# Patient Record
Sex: FEMALE | Race: WHITE | NOT HISPANIC OR LATINO | Employment: OTHER | URBAN - METROPOLITAN AREA
[De-identification: names, ages, dates, MRNs, and addresses within clinical notes are randomized per-mention and may not be internally consistent; named-entity substitution may affect disease eponyms.]

---

## 2017-05-16 ENCOUNTER — PATIENT MESSAGE (OUTPATIENT)
Dept: OPTOMETRY | Facility: CLINIC | Age: 62
End: 2017-05-16

## 2017-05-17 ENCOUNTER — PATIENT MESSAGE (OUTPATIENT)
Dept: OPTOMETRY | Facility: CLINIC | Age: 62
End: 2017-05-17

## 2017-06-22 ENCOUNTER — HOSPITAL ENCOUNTER (OUTPATIENT)
Dept: RADIOLOGY | Facility: HOSPITAL | Age: 62
Discharge: HOME | End: 2017-06-22
Attending: INTERNAL MEDICINE
Payer: COMMERCIAL

## 2017-06-22 ENCOUNTER — OFFICE VISIT (OUTPATIENT)
Dept: INTERNAL MEDICINE | Facility: CLINIC | Age: 62
End: 2017-06-22
Payer: COMMERCIAL

## 2017-06-22 ENCOUNTER — TELEPHONE (OUTPATIENT)
Dept: INTERNAL MEDICINE | Facility: CLINIC | Age: 62
End: 2017-06-22

## 2017-06-22 ENCOUNTER — OFFICE VISIT (OUTPATIENT)
Dept: OPTOMETRY | Facility: CLINIC | Age: 62
End: 2017-06-22
Payer: COMMERCIAL

## 2017-06-22 ENCOUNTER — CLINICAL SUPPORT (OUTPATIENT)
Dept: OPHTHALMOLOGY | Facility: CLINIC | Age: 62
End: 2017-06-22
Payer: COMMERCIAL

## 2017-06-22 VITALS
WEIGHT: 140.44 LBS | SYSTOLIC BLOOD PRESSURE: 120 MMHG | HEART RATE: 88 BPM | TEMPERATURE: 98 F | BODY MASS INDEX: 23.37 KG/M2 | DIASTOLIC BLOOD PRESSURE: 70 MMHG

## 2017-06-22 VITALS — BODY MASS INDEX: 23.32 KG/M2 | HEIGHT: 65 IN | WEIGHT: 140 LBS

## 2017-06-22 DIAGNOSIS — H40.013 OAG (OPEN ANGLE GLAUCOMA) SUSPECT, LOW RISK, BILATERAL: Primary | ICD-10-CM

## 2017-06-22 DIAGNOSIS — Z83.511 FAMILY HISTORY OF GLAUCOMA IN BROTHER: ICD-10-CM

## 2017-06-22 DIAGNOSIS — Z12.39 BREAST CANCER SCREENING: ICD-10-CM

## 2017-06-22 DIAGNOSIS — Z00.00 ROUTINE GENERAL MEDICAL EXAMINATION AT A HEALTH CARE FACILITY: Primary | ICD-10-CM

## 2017-06-22 DIAGNOSIS — Z00.00 GENERAL MEDICAL EXAM: Primary | ICD-10-CM

## 2017-06-22 DIAGNOSIS — H25.13 NUCLEAR SCLEROTIC CATARACT OF BOTH EYES: ICD-10-CM

## 2017-06-22 DIAGNOSIS — H52.223 REGULAR ASTIGMATISM OF BOTH EYES: ICD-10-CM

## 2017-06-22 PROCEDURE — 77067 SCR MAMMO BI INCL CAD: CPT | Mod: TC

## 2017-06-22 PROCEDURE — 92133 CPTRZD OPH DX IMG PST SGM ON: CPT | Mod: S$GLB,,, | Performed by: OPTOMETRIST

## 2017-06-22 PROCEDURE — 92015 DETERMINE REFRACTIVE STATE: CPT | Mod: S$GLB,,, | Performed by: OPTOMETRIST

## 2017-06-22 PROCEDURE — 99999 PR PBB SHADOW E&M-EST. PATIENT-LVL III: CPT | Mod: PBBFAC,,, | Performed by: INTERNAL MEDICINE

## 2017-06-22 PROCEDURE — 92083 EXTENDED VISUAL FIELD XM: CPT | Mod: S$GLB,,, | Performed by: OPTOMETRIST

## 2017-06-22 PROCEDURE — 92014 COMPRE OPH EXAM EST PT 1/>: CPT | Mod: S$GLB,,, | Performed by: OPTOMETRIST

## 2017-06-22 PROCEDURE — 77067 SCR MAMMO BI INCL CAD: CPT | Mod: 26,,, | Performed by: RADIOLOGY

## 2017-06-22 PROCEDURE — 99396 PREV VISIT EST AGE 40-64: CPT | Mod: S$GLB,,, | Performed by: INTERNAL MEDICINE

## 2017-06-22 PROCEDURE — 99999 PR PBB SHADOW E&M-EST. PATIENT-LVL II: CPT | Mod: PBBFAC,,, | Performed by: OPTOMETRIST

## 2017-06-22 PROCEDURE — 77063 BREAST TOMOSYNTHESIS BI: CPT | Mod: 26,,, | Performed by: RADIOLOGY

## 2017-06-22 RX ORDER — TESTOSTERONE CYPIONATE 200 MG/ML
INJECTION, SOLUTION INTRAMUSCULAR
COMMUNITY
End: 2021-05-26

## 2017-06-22 RX ORDER — ESTRADIOL 0.04 MG/D
FILM, EXTENDED RELEASE TRANSDERMAL
COMMUNITY
Start: 2014-07-31 | End: 2020-01-21 | Stop reason: CLARIF

## 2017-06-22 RX ORDER — VITAMIN B COMPLEX
CAPSULE ORAL
COMMUNITY
End: 2023-08-29

## 2017-06-22 RX ORDER — TESTOSTERONE CYPIONATE 1000 MG/10ML
INJECTION, SOLUTION INTRAMUSCULAR
COMMUNITY
Start: 2014-07-31 | End: 2023-08-29

## 2017-06-22 NOTE — PROGRESS NOTES
HPI     Glaucoma    Additional comments: 6 mon iop chk           Comments   Ms. Dawit Carrion is her for a 6 month f/u (glaucoma suspect OU)   and review of 6-month repeat HVF 24-2ss and RNFL OCT that were performed   earlier today.    Patient complains of her OS va being a little more blurry for the distance   since her last visit. She requests refraction today.    (-)drops  (-)flashes  (-)floaters  (-)diplopia    Diabetic: no  No results found for: HGBA1C    OCULAR HISTORY  Last Eye Exam 11/01/16 with Dr. Blanca  (+)eye surgery- LASIK OU 2001  OAG suspect, low risk OU  Cataracts OU   Patient states that she was seen by Dr. Bernal in [place], [place] in Feb. 2016. Pt states that she was previously being treated for ocular   hypertension was taking latanoprost OU qhs, but after further testing was   taken off gtts. Pt states that Dr Bernal told her that she did not have   glaucoma, but had peripheral vision loss OU. Pt was also told that she had   a splinter hemorrhage right disc, but no optic nerve damage.     FAMILY HISTORY  (+)Glaucoma- Brother  Retinal Tear- Brother         Last edited by Jillian Blanca, OD on 6/22/2017  2:35 PM. (History)            Assessment /Plan     For exam results, see Encounter Report.    OAG (open angle glaucoma) suspect, low risk, bilateral  Family history of glaucoma in brother   Reviewed with pt the results of HVF and OCT. Possible progression on OCT vs inter-test variability compared to 6 months ago. HVF and IOP relatively stable OD and OS vs 6 months ago. Disc appearance is stable OU compared to stereo disc photos in 2016.   Discussed pros and cons of initiating drops now vs re-checking in 6 months. Pt opts to recheck in 6 months.   Interpretation of RNFL OCT (6/22/2017):    OD: WNL all sectors. Thinning (or inter-test variability) in ST, T, and IT sectors vs 6 months ago.    OS: WNL all sectors. Thinning (or inter-test variability) in T and IT sectors vs 6 months  ago.       Date ST T IT IN N SN G    OD 04/16 135 111 165 102 64 105 107     11/16 142 106 166 109 68 109 109     06/17 119 100 160 114 72 110 106    OS 04/16 112 98 187 117 63 127 108     11/16 115 92 179 127 68 139 110     06/17 108 81 165 128 67 129 103     Interpretation of HVF 24-2ss (6/22/2017):    OD: Good reliability (fixation losses 1/15, false positives 1%, false negatives 0%). Inferior-nasal step (stable, possibly correlates with ST progression on OCT and thin superior rim). Possible early superior-nasal step (possibly correlates with mild IT progression on OCT). GHT ONL. GPA says no progression. Relatively stable.    OS: Good reliability (fixation losses 0/14, false positives 2%, false negatives 5%). Inferior-nasal step (no correlation with OCT). GHT ONL. GPA says no progression. Relatively stable.      GLAUCOMA HISTORY (updated 6/22/2017):  Diagnosis: Open angle glaucoma suspect OU  (+)Family history of glaucoma in brother  PreTx Tmax: 20mmHg OU  Target IOP: n/a  Started treatment on: n/a  Current treatment: none  Adverse effects: n/a  CCT: 586/612  Last RNFL OCT (6/22/2017):  OD: WNL all sectors. Thinning (or inter-test variability) in ST, T, and IT sectors vs 6 months ago.  OS: WNL all sectors. Thinning (or inter-test variability) in T and IT sectors vs 6 months ago.  Last HVF (6/22/2017):  OD: Good reliability (fixation losses 1/15, false positives 1%, false negatives 0%). Inferior-nasal step (stable, possibly correlates with ST progression on OCT). Possible early superior-nasal step (possibly correlates with mild IT progression on OCT). GHT ONL. GPA says no progression. Relatively stable.  OS: Good reliability (fixation losses 0/14, false positives 2%, false negatives 5%). Inferior-nasal step (no correlation with OCT). GHT ONL. GPA says no progression. Relatively stable.  Last stereo disc photos: 04/2016      Nuclear sclerotic cataract of both eyes   OS>OD. Cause of reduced best-corrected visual  acuity OS (20/30); but activities of daily living unaffected so surgery not yet indicated. Recheck in 6 months.    Regular astigmatism of both eyes   Myopic shift OS>OD. New glasses prescription released, adaptation expected.     Explained that cataract progression can cause changes in refractive error  Eyeglass Final Rx     Eyeglass Final Rx       Sphere Cylinder Axis Dist VA Add    Right [place] +0.75 015 20/20 +2.50    Left -0.25 +0.75 075 20/30 +2.50    Expiration Date:  6/23/2018                   RTC 6 months for HVF 24-2ss and RNFL OCT, followed by IOP check with Dr. Blanca (DFE only if indicated)

## 2017-06-22 NOTE — PATIENT INSTRUCTIONS
CATARACT    Symptoms and Signs:  A cataract starts out small, and at first has little effect on your vision. You may notice that your vision is blurred a little, like looking through a cloudy piece of glass or viewing an impressionist painting. A cataract may make light from the sun or a lamp seem too bright or glaring. Or you may notice when you drive at night that the oncoming headlights cause more glare than before. Colors may not appear as bright as they once did.  The type of cataract you have will affect exactly which symptoms you experience and how soon they will occur. When a nuclear cataract first develops it can bring about a temporary improvement in your near vision, called second sight. Unfortunately, the improved vision is short-lived and will disappear as the cataract worsens. Meanwhile, a sub-capsular cataract may not produce any symptoms until it's well-developed.    Causes:  No one knows for sure why the eye's lens changes as we age, forming cataracts. Researchers are gradually identifying factors that may cause cataracts - and information that may help to prevent them.  Many studies suggest that exposure to ultraviolet light is associated with cataracts, so eye care practitioners recommend wearing sunglasses and a wide-brimmed hat to lessen your exposure.  Other studies suggest people with diabetes are at risk for developing a cataract.   Some eye care practitioners believe that a diet high in antioxidants, such as beta-carotene (vitamin A), selenium and vitamins C and E, may forestall cataracts.  The most important of these is probably vitamin C; it might be helpful to supplement the diet with an extra Vitamin C tablet.  Meanwhile, eating a lot of salt may increase your risk.  Other risk factors include cigarette smoke, air pollution and heavy alcohol consumption.  We simply recommend that you be careful to use sunglasses and to take Vitamin C.    Treatment:  When symptoms begin to appear, we can  improve your vision for a while using new glasses, strong bifocals, magnification, appropriate lighting or other visual aids.  This is true in your case; your cataract does not impact your vision very much at this time. If you experience any of the symptoms we described you can return at any time. Otherwise it is fine to see you in 1 year.

## 2017-06-22 NOTE — PROGRESS NOTES
Subjective:       Patient ID: Dawit Carrion is a 62 y.o. female.    Chief Complaint: Annual Exam    HPIPleasant lady from [place] here for her annual exam. Overall doing well and had no complaints. She is active physically, exercises regularly, watches her diet and has maintained her weight.  Review of Systems   Constitutional: Negative for activity change, appetite change, fatigue and unexpected weight change.   HENT: Negative.    Eyes: Negative for visual disturbance.   Respiratory: Negative for cough, shortness of breath and wheezing.    Cardiovascular: Negative for chest pain, palpitations and leg swelling.   Gastrointestinal: Negative for abdominal distention, abdominal pain and blood in stool.   Endocrine: Negative.    Genitourinary: Negative for difficulty urinating.   Musculoskeletal: Negative for arthralgias, back pain and joint swelling.   Neurological: Negative for dizziness, weakness, light-headedness, numbness and headaches.   Hematological: Negative.        Objective:      Physical Exam   Constitutional: She is oriented to person, place, and time. She appears well-developed and well-nourished. No distress.   HENT:   Head: Normocephalic and atraumatic.   Right Ear: External ear normal.   Left Ear: External ear normal.   Mouth/Throat: Oropharynx is clear and moist. No oropharyngeal exudate.   Eyes: Conjunctivae and EOM are normal. Right eye exhibits no discharge. Left eye exhibits no discharge. No scleral icterus.   Neck: Normal range of motion. Neck supple. No JVD present. No thyromegaly present.   Cardiovascular: Normal rate, regular rhythm, normal heart sounds and intact distal pulses.    Pulmonary/Chest: Effort normal and breath sounds normal. No respiratory distress. She has no wheezes. She exhibits no tenderness.   Abdominal: Soft. Bowel sounds are normal. She exhibits no distension and no mass. There is no tenderness.   Musculoskeletal: Normal range of motion. She exhibits no edema or  tenderness.   Lymphadenopathy:     She has no cervical adenopathy.   Neurological: She is oriented to person, place, and time. No cranial nerve deficit. Coordination normal.   Skin: Skin is warm. No rash noted. She is not diaphoretic. No erythema.   Psychiatric: She has a normal mood and affect. Her behavior is normal. Judgment and thought content normal.   Vitals reviewed.      Assessment:       1. Routine general medical examination at a health care facility        Plan:    1. Obtain labs.         2. Obtain MMG.         3. Call with results.         4. RTC 1 year or sooner if needed.

## 2017-12-14 ENCOUNTER — OFFICE VISIT (OUTPATIENT)
Dept: OPTOMETRY | Facility: CLINIC | Age: 62
End: 2017-12-14
Payer: COMMERCIAL

## 2017-12-14 ENCOUNTER — CLINICAL SUPPORT (OUTPATIENT)
Dept: OPHTHALMOLOGY | Facility: CLINIC | Age: 62
End: 2017-12-14
Payer: COMMERCIAL

## 2017-12-14 DIAGNOSIS — H40.013 OAG (OPEN ANGLE GLAUCOMA) SUSPECT, LOW RISK, BILATERAL: Primary | ICD-10-CM

## 2017-12-14 DIAGNOSIS — H40.013 OAG (OPEN ANGLE GLAUCOMA) SUSPECT, LOW RISK, BILATERAL: ICD-10-CM

## 2017-12-14 DIAGNOSIS — H25.13 NUCLEAR SCLEROTIC CATARACT OF BOTH EYES: ICD-10-CM

## 2017-12-14 DIAGNOSIS — Z83.511 FAMILY HISTORY OF GLAUCOMA IN BROTHER: ICD-10-CM

## 2017-12-14 PROCEDURE — 92083 EXTENDED VISUAL FIELD XM: CPT | Mod: S$GLB,,, | Performed by: OPTOMETRIST

## 2017-12-14 PROCEDURE — 92133 CPTRZD OPH DX IMG PST SGM ON: CPT | Mod: S$GLB,,, | Performed by: OPTOMETRIST

## 2017-12-14 PROCEDURE — 92020 GONIOSCOPY: CPT | Mod: S$GLB,,, | Performed by: OPTOMETRIST

## 2017-12-14 PROCEDURE — 92012 INTRM OPH EXAM EST PATIENT: CPT | Mod: S$GLB,,, | Performed by: OPTOMETRIST

## 2017-12-14 PROCEDURE — 99999 PR PBB SHADOW E&M-EST. PATIENT-LVL II: CPT | Mod: PBBFAC,,, | Performed by: OPTOMETRIST

## 2017-12-14 NOTE — PROGRESS NOTES
HPI     Ms. Dawit Carrion is her for a 6 month f/u (glaucoma suspect OU)   and review of 6-month repeat HVF 24-2ss and RNFL OCT that were performed   earlier today.    Pt states her vision in OS is more blurry at distance with glasses since   her last visit 6 months ago, pt says she is concerned about the   development of her cataracts.    (-)drops  (-)flashes  (-)floaters  (-)diplopia    Diabetic: no  Hemoglobin A1C       Date                     Value               Ref Range             Status                06/22/2017               5.3                 4.0 - 5.6 %           Final             ----------     OCULAR HISTORY  Last Eye Exam 06/22/17 with Dr. Blanca  S/p LASIK OU 2001  OAG suspect, low risk OU  Cataracts OU  Patient states that she was seen by Dr. Bernal in [place], [place] in Feb. 2016.   Pt states that she was previously being treated for ocular hypertension   and was taking latanoprost OU qhs, but after further testing was taken off   gtts. Pt states that Dr Bernal told her that she did not have glaucoma, but   had peripheral vision loss OU. Pt was also told that she had a splinter   hemorrhage right disc, but no optic nerve damage. Pt thinks Tmax measured   by Dr. Bernal was 19 and 20.     FAMILY HISTORY  (+)Glaucoma: Brother  (+)Retinal Tear: Brother      Last edited by Jillian Blanca, OD on 12/14/2017  2:28 PM. (History)            Assessment /Plan     For exam results, see Encounter Report.    OAG (open angle glaucoma) suspect, low risk, bilateral  Family history of glaucoma in brother   6-month repeat HVF/OCT performed today and reviewed with pt (see full interpretation in Glaucoma History below).   IOP elevated today OU, but angles open and normal with gonioscopy OU. Disc appearance, HVF, and OCT stable today OU compared to 6 months ago; therefore continue without treatment. Repeat HVF/OCT in 6 months to monitor for progression.      -     [place] Visual Field - OU - Extended - Both Eyes;  Future  -     Posterior Segment OCT Optic Nerve- Both eyes; Future    GLAUCOMA HISTORY (updated 12/14/2017):  Diagnosis: Open angle glaucoma suspect OU  Patient states that she was seen by Dr. Bernal in [place], [place] in Feb. 2016. Pt states that she was previously being treated for ocular hypertension and was taking latanoprost OU qhs, but after further testing was taken off gtts. Pt states that Dr Bernal told her that she did not have glaucoma, but had peripheral vision loss OU. Pt was also told that she had a splinter hemorrhage right disc, but no optic nerve damage. Pt thinks Tmax measured by Dr. Bernal was 19 and 20.   (+)Family history of glaucoma in brother  PreTx Tmax: 22mmHg OU  Target IOP: n/a  Started treatment on: n/a  Current treatment: none  Adverse effects: n/a  CCT: 586/612  Last RNFL OCT (12/14/2017):   OD: WNL all sectors. Stable compared to 6 months ago.   OS: WNL all sectors. Stable compared to 6 months ago.                                       Date     ST T          IT         IN         N          SN       G                          OD       04/16   135      111      165      102      64        105      107                                      11/16   142      106      166      109      68        109      109                                      06/17   119      100      160      114      72        110      106     12/17 119 99 158 119 72 110 106 Stable compared to 06/2017.                            OS       04/16   112      98        187      117      63        127      108                                      11/16   115      92        179      127      68        139      110                                      06/17   108      81        165      128      67        129      103     12/17 118 80 167 131 70 153 106 Stable compared to 06/2017  Last Posterior Pole OCT (12/14/2017):   OD: Superior hemisphere slightly thinner than inferior. Normal foveal contour and macular morphology.   OS:  Superior and inferior hemisphere relatively symmetrical. Normal foveal contour and macular morphology.  Last HVF 24-2ss (12/14/2017):   OD: Good reliability (fixation losses 0/14, false positives 0%, false negatives 0%). Inferior-nasal step (stable, correlates with superior thinning on Posterior Pole OCT and clinical appearance, no correlation with RNFL OCT). GHT ONL. Relatively stable (and GPA says no progression).   OS: Good reliability (fixation losses 0/14, false positives 1%, false negatives 0%). Inferior-nasal step (no correlation with OCT). GHT ONL. Relatively stable (and GPA says no progression).  Last stereo disc photos: 04/2016  Last DFE: 06/22/17  Last gonio: 12/14/2017       Nuclear sclerotic cataract of both eyes   Stable OD. Possible early PSC OS causing reduced best-corrected visual acuity OS. Surgery not yet indicated. Recheck in 6 months.        RTC 6 months for HVF 24-2ss, RNFL OCT, and Posterior Pole OCT followed by comprehensive eye exam (including DFE and refraction) with Dr. Blanca

## 2018-05-23 ENCOUNTER — TELEPHONE (OUTPATIENT)
Dept: OPTOMETRY | Facility: CLINIC | Age: 63
End: 2018-05-23

## 2018-05-23 NOTE — TELEPHONE ENCOUNTER
----- Message from Jillian Blanca OD sent at 5/23/2018  1:14 PM CDT -----  Regarding: schedule HVF/OCT  Hello,    This patient is scheduled with me on 06/01/18. However, she also needs a HVF 24-2ss, RNFL OCT, and Posterior Pole OCT. Please call her to schedule this.    Thank you,  Jillian Blanca OD

## 2018-05-25 ENCOUNTER — PATIENT MESSAGE (OUTPATIENT)
Dept: OPTOMETRY | Facility: CLINIC | Age: 63
End: 2018-05-25

## 2018-06-01 ENCOUNTER — CLINICAL SUPPORT (OUTPATIENT)
Dept: OPHTHALMOLOGY | Facility: CLINIC | Age: 63
End: 2018-06-01
Payer: COMMERCIAL

## 2018-06-01 ENCOUNTER — OFFICE VISIT (OUTPATIENT)
Dept: OPTOMETRY | Facility: CLINIC | Age: 63
End: 2018-06-01
Payer: COMMERCIAL

## 2018-06-01 DIAGNOSIS — H40.013 OAG (OPEN ANGLE GLAUCOMA) SUSPECT, LOW RISK, BILATERAL: ICD-10-CM

## 2018-06-01 DIAGNOSIS — H25.13 NUCLEAR SCLEROTIC CATARACT OF BOTH EYES: ICD-10-CM

## 2018-06-01 DIAGNOSIS — H40.1132 PRIMARY OPEN ANGLE GLAUCOMA (POAG) OF BOTH EYES, MODERATE STAGE: Primary | ICD-10-CM

## 2018-06-01 PROCEDURE — 92014 COMPRE OPH EXAM EST PT 1/>: CPT | Mod: S$GLB,,, | Performed by: OPTOMETRIST

## 2018-06-01 PROCEDURE — 99999 PR PBB SHADOW E&M-EST. PATIENT-LVL III: CPT | Mod: PBBFAC,,, | Performed by: OPTOMETRIST

## 2018-06-01 PROCEDURE — 92083 EXTENDED VISUAL FIELD XM: CPT | Mod: S$GLB,,, | Performed by: OPTOMETRIST

## 2018-06-01 PROCEDURE — 92133 CPTRZD OPH DX IMG PST SGM ON: CPT | Mod: S$GLB,,, | Performed by: OPTOMETRIST

## 2018-06-01 RX ORDER — LATANOPROST 50 UG/ML
1 SOLUTION/ DROPS OPHTHALMIC NIGHTLY
Qty: 2.5 ML | Refills: 1 | Status: SHIPPED | OUTPATIENT
Start: 2018-06-01 | End: 2018-07-06 | Stop reason: SDUPTHER

## 2018-06-01 NOTE — PROGRESS NOTES
HPI     Ms. Dawit Carrion is here for a 6 month f/u (glaucoma suspect OU)     and review of 6-month repeat HVF 24-2ss and RNFL OCT that were performed   earlier today.     Pt c/o having trouble reading her computer screen clearly (especially OS,   she thinks cataract has progressed OS). Pt states that she never purchased   new glasses last year. She declines refraction today, will postpone until   next visit.     (-)drops  (-)flashes  (-)floaters  (-)diplopia     Diabetic: no  Hemoglobin A1C       Date                     Value               Ref Range             Status           06/22/2017               5.3                 4.0 - 5.6 %         Final       OCULAR HISTORY  Last Eye Exam 12/14/2017 with Dr. Blanca  S/p LASIK OU 2001  Cataracts OU  OAG suspect OU  Patient states that she was seen by Dr. Bernal in [place], [place] in Feb. 2016.   Pt states that she was previously being treated for ocular hypertension   and was taking Latanoprost OU qhs, but after further testing was taken off   gtts. Pt states that Dr Bernal told her that she did not have glaucoma, but   had peripheral vision loss OU. Pt was also told that she had a splinter   hemorrhage right disc, but no optic nerve damage. Pt thinks Tmax measured   by Dr. Bernal was 19 and 20.      FAMILY HISTORY  (+)Glaucoma: Brother  (+)Retinal Detachment with scleral bluckle: Brother    Last edited by Jillian Blanca, OD on 6/3/2018  4:38 PM. (History)            Assessment /Plan     For exam results, see Encounter Report.    Primary open angle glaucoma (POAG) of both eyes, moderate stage   Previously monitored as glaucoma suspect based on family history, glaucomatous VF defects OU, and borderline IOP. Repeat HVF/OCT performed today and reviewed with pt: RNFL OCT  OU, but on a downward trend OU since 2016 baseline. Increased density of VF defects OD, relatively stable OS.   Discussed management options with pt, pt opts to start IOP-lowering drops.   Start  Latanoprost QHS OU. Discussed potential adverse effects (pt is not concerns about darkening of iris color or eyelids), and she understands that she will be on drops long-term.   RTC 1 month for IOP check (pt travels to [facility] from out of town, so I will review the IOP measured at her cataract evaluation next month and will call her to discuss future plans and re-order Latanoprost if IOP target is achieved).     -     latanoprost 0.005 % ophthalmic solution; Place 1 drop into both eyes every evening.  Dispense: 2.5 mL; Refill: 1    GLAUCOMA HISTORY (updated 06/01/18):  Diagnosis: Moderate POAG OU  Patient states that she was seen by Dr. Bernal in [place], [place] in Feb. 2016. Pt states that she was previously being treated for ocular hypertension and was taking latanoprost OU qhs, but after further testing was taken off gtts. Pt states that Dr Bernal told her that she did not have glaucoma, but had peripheral vision loss OU. Pt was also told that she had a splinter hemorrhage right disc, but no optic nerve damage. Pt thinks Tmax measured by Dr. Bernal was 19 and 20.   (+)Family history of glaucoma in brother  PreTx Tmax: 22mmHg OU  Target IOP: high teens OU  Started treatment on: 06/01/18  Current treatment: (started Latanoprost qhs OU 06/01/18)  Adverse effects: n/a  CCT: 586/612  Last RNFL OCT (06/01/18):              OD: WNL all sectors. Relatively stable compared to 6 months ago, but on downward trend in ST, T, IT sectors since baseline in April 2016.              OS: WNL all sectors. Relatively stable compared to 6 months ago, but downward trend in T, IT sectors since baseline in April  2016.                                      Date     ST        T          IT         IN         N          SN       G                          OD       04/16   135      111      165      102      64        105      107                                      11/16   142      106      166      109      68        109      109                                      06/17   119      100      160      114      72        110      106                                      12/17   119      99        158      119      72        110      106           06/18 118 95 154 117 69 105 103                          OS       04/16   112      98        187      117      63        127      108                                      11/16   115      92        179      127      68        139      110                                      06/17   108      81        165      128      67        129      103                                      12/17   118      80        167      131      70        153      106          06/18 108 80 167 130 71 132 105  Last Posterior Pole OCT (06/01/18):    OU: superior and inferior hemispheres relatively symmetrical. Normal foveal contour and macular morphology.  Last HVF 24-2ss (06/01/18):   OD: Good reliability (fixation losses 1/15, false positives 0%, false negatives 11%). Dense inferior-nasal step with early inferior arcuate (correlates with progression of ST sector on RNFL OCT, increased density although GPA states no progression detected). Superior-nasal step (correlates with progression of IT sector on RNFL OCT, increased density although GP states no progression detected). GHT ONL.   OS: Good reliability (fixation losses 2/15, false positives 2%, false negatives 2%). IN step with inferior arcuate (relatively stable). GPA states no progression detected.  Last stereo disc photos: 04/2016  Last DFE: 06/01/18  Last gonio: 12/14/2017     Nuclear sclerotic cataract of both eyes   OS>>OD. Cause of reduced  best-corrected visual acuity OS (20/30-, relatively stable since last visit, but now activities of daily living affected). Discussed cataract surgery, cataract evaluation scheduled for 07/06/18.  -     Ambulatory Referral to Ophthalmology        RTC 07/06/18 for IOP check and cataract evaluation

## 2018-06-01 NOTE — PATIENT INSTRUCTIONS
Latanoprost eye solution  What is this medicine?  LATANOPROST (la TA margot prost) is used in the eye to treat open angle glaucoma and high pressure in the eye.  How should I use this medicine?  This medicine is only for use in the eye. Do not take by mouth. Follow the directions on the prescription label. Wash hands before and after use. Tilt the head back slightly and pull down the lower lid with the index finger to form a pouch. Try not to touch the tip of the dropper to your eye or into the pouch. Squeeze the prescribed number of drops into the pouch and gently close the eyes for 1 to 2 minutes. Do not blink. Use your doses at regular intervals. Do not use you medicine more often than directed. If you are using another eye product, wait at least 5 minutes between use of this medicine and the other eye product.  Talk to your pediatrician regarding the use of this medicine in children. Special care may be needed.  What side effects may I notice from receiving this medicine?  Side effects that you should report to your doctor or health care professional as soon as possible:  · allergic reactions like skin rash, itching or hives, swelling of the face, lips, or tongue  · changes in vision  · redness, blistering, peeling or loosening of the skin, including inside the mouth  · swollen or infected eyes or eyelids  Side effects that usually do not require medical attention (report to your doctor or health care professional if they continue or are bothersome):  · burning, stinging, or discomfort immediately after using the solution  · changes in eye, eyelash, or eyelid color  · dry eyes  · increased flow of tears  · sensitivity of the eyes to light  What may interact with this medicine?  · bromfenac  · eye drops that contain thimerosal (a preservative)  What if I miss a dose?  If you miss a dose, use it as soon as you can. If it is almost time for your next dose, use only that dose. Do not use double or extra doses.  Where  should I keep my medicine?  Keep out of the reach of children.  Store unopened bottle in the refrigerator at 2 to 8 degrees C (36 to 46 degrees F). Once opened, the container may be stored at room temperature up to 25 degrees C (77 degrees F) for up to 6 weeks. Protect from light. Throw away any unused medicine after the expiration date.  What should I tell my health care provider before I take this medicine?  They need to know if you have any of these conditions:  · closed angle glaucoma  · eye injury, infection, or swelling  · wear contact lenses  · an unusual or allergic reaction to latanoprost or other medicines, foods, dyes, or preservatives  · pregnant or trying to get pregnant  · breast-feeding  What should I watch for while using this medicine?  Visit your doctor or health care professional for regular checks on your progress. Report any serious side effects right away. Stop using this medicine if your eyes get swollen, painful, or have a discharge, and see your doctor or health care professional as soon as you can.  This medicine may cause your eye, eyelashes, and eyelids to change color. Tell your doctor or health care professional if this happens. If only one eye is being treated with with this medicine, a difference may develop between the treated and untreated eye in eyelash length, darkness or thickness, and/or color changes of the eyelid skin or iris. These changes may be permanent.  If you wear contact lenses, take them out before placing drops in the eye. Contact lenses may be put back in 15 minutes after putting the drops in your eyes.  Wear dark glasses if this medicine makes your eyes more sensitive to light.  NOTE:This sheet is a summary. It may not cover all possible information. If you have questions about this medicine, talk to your doctor, pharmacist, or health care provider. Copyright© 2017 [employer]

## 2018-06-03 PROBLEM — H40.1132 PRIMARY OPEN ANGLE GLAUCOMA (POAG) OF BOTH EYES, MODERATE STAGE: Status: ACTIVE | Noted: 2017-12-14

## 2018-06-08 ENCOUNTER — PATIENT MESSAGE (OUTPATIENT)
Dept: INTERNAL MEDICINE | Facility: CLINIC | Age: 63
End: 2018-06-08

## 2018-06-08 ENCOUNTER — TELEPHONE (OUTPATIENT)
Dept: INTERNAL MEDICINE | Facility: CLINIC | Age: 63
End: 2018-06-08

## 2018-06-08 DIAGNOSIS — Z12.39 BREAST CANCER SCREENING: Primary | ICD-10-CM

## 2018-07-06 ENCOUNTER — INITIAL CONSULT (OUTPATIENT)
Dept: OPHTHALMOLOGY | Facility: CLINIC | Age: 63
End: 2018-07-06
Payer: COMMERCIAL

## 2018-07-06 ENCOUNTER — HOSPITAL ENCOUNTER (OUTPATIENT)
Dept: RADIOLOGY | Facility: HOSPITAL | Age: 63
Discharge: HOME | End: 2018-07-06
Attending: INTERNAL MEDICINE
Payer: COMMERCIAL

## 2018-07-06 VITALS — WEIGHT: 140 LBS | BODY MASS INDEX: 23.3 KG/M2

## 2018-07-06 DIAGNOSIS — Z98.890 S/P LASIK SURGERY OF BOTH EYES: ICD-10-CM

## 2018-07-06 DIAGNOSIS — H25.12 SENILE NUCLEAR SCLEROSIS, LEFT: ICD-10-CM

## 2018-07-06 DIAGNOSIS — H52.7 REFRACTION ERROR: ICD-10-CM

## 2018-07-06 DIAGNOSIS — H40.1132 PRIMARY OPEN ANGLE GLAUCOMA (POAG) OF BOTH EYES, MODERATE STAGE: Primary | ICD-10-CM

## 2018-07-06 DIAGNOSIS — Z12.39 BREAST CANCER SCREENING: ICD-10-CM

## 2018-07-06 PROCEDURE — 77063 BREAST TOMOSYNTHESIS BI: CPT | Mod: 26,,, | Performed by: RADIOLOGY

## 2018-07-06 PROCEDURE — 99999 PR PBB SHADOW E&M-EST. PATIENT-LVL II: CPT | Mod: PBBFAC,,, | Performed by: OPHTHALMOLOGY

## 2018-07-06 PROCEDURE — 92020 GONIOSCOPY: CPT | Mod: S$GLB,,, | Performed by: OPHTHALMOLOGY

## 2018-07-06 PROCEDURE — 77067 SCR MAMMO BI INCL CAD: CPT | Mod: TC,PO

## 2018-07-06 PROCEDURE — 92014 COMPRE OPH EXAM EST PT 1/>: CPT | Mod: S$GLB,,, | Performed by: OPHTHALMOLOGY

## 2018-07-06 PROCEDURE — 77067 SCR MAMMO BI INCL CAD: CPT | Mod: 26,,, | Performed by: RADIOLOGY

## 2018-07-06 PROCEDURE — 92250 FUNDUS PHOTOGRAPHY W/I&R: CPT | Mod: S$GLB,,, | Performed by: OPHTHALMOLOGY

## 2018-07-06 RX ORDER — LATANOPROST 50 UG/ML
1 SOLUTION/ DROPS OPHTHALMIC NIGHTLY
Qty: 2.5 ML | Refills: 12 | Status: SHIPPED | OUTPATIENT
Start: 2018-07-06

## 2018-07-06 NOTE — LETTER
July 6, 2018      Jillian Blanca, OD  [address]  [facility] [address]           [facility]hthal[facility]  [address]  [facility] [address]  Phone: [phone]  Fax: [phone]          Patient: Dawit Carrion   MR Number: [id number]   YOB: 1955   Date of Visit: 7/6/2018       Dear Dr. Jillian Blanca:    Thank you for referring Dawit Carrion to me for evaluation. Attached you will find relevant portions of my assessment and plan of care.    If you have questions, please do not hesitate to call me. I look forward to following Dawit Carrion along with you.    Sincerely,    Radha Cohen MD    Enclosure  CC:  No Recipients    If you would like to receive this communication electronically, please contact [email or web address] or [phone] to request more information on [facility] access.    For providers and/or their staff who would like to refer a patient to [facility], please contact us through our one-stop-shop provider referral line, [facility], at [phone].    If you feel you have received this communication in error or would no longer like to receive these types of communications, please e-mail [email or web address]

## 2018-07-06 NOTE — PROGRESS NOTES
HPI     DLS: 6/01/18 with Dr. Blanca      Pt from [place] - use to be on galucoma gtts // then taken off them by   doctor in [place]   Has been monitored by Dr. Blanca for a few years   She recently put her back on gtts   + FmHx - brother with glaucoma   Pt here for Glaucoma/Cataract Eval per Dr. Blanca;    Meds: Latanoprost qhs ou    1. Primary open angle glaucoma (POAG) of both eyes, moderate stage  2. Nuclear sclerotic cataract of both eyes     Last edited by Radha Cohen MD on 7/6/2018 12:25 PM. (History)            Assessment /Plan     For exam results, see Encounter Report.    Primary open angle glaucoma (POAG) of both eyes, moderate stage  -     latanoprost 0.005 % ophthalmic solution; Place 1 drop into both eyes every evening.  Dispense: 2.5 mL; Refill: 12    Senile nuclear sclerosis, left    Refraction error    S/P LASIK surgery of both eyes           Glaucoma (type and duration)    H/O glaucoma - used gtts in past // then off x > 2 yrs - back on lat - 6/1/2018   First HVF   2016   First photos   2018   Treatment / Drops started   Re-started 6/1/2018 - latanoprost            Family history    + brother         Glaucoma meds    Latanoprost ou         H/O adverse rxn to glaucoma drops    none        LASERS    none        GLAUCOMA SURGERIES    none        OTHER EYE SURGERIES    LASIK -         CDR    0.7/0.75        Tbase    17-22 // 18-22          Tmax    22/22            Ttarget    ?             HVF    5 test 2016 to  2018 - INS od // INS os        Gonio    +3 ou - steep approach         CCT    586/612 (post LASIK)        OCT    5 test 2016 to 2018 - RNFL - nl  od // nl os        HRT    ? test 20? to 20? - MR - ? od // ? os        Disc photos    2018     - Ttoday    16/15 (on latanoprsot ) - fair response   - Test done today    Gonio / DFE / photos - establish care     2. NS   Os>od    CONSIDER PHACO / IOL OS IN NEAR FUTURE    3.refractive error     4. H/O lasik    May make IOL calc difficult - ? ORA -     consider referral to cornea service when cataract needs to be done     PLAN  Photos today   Cont latanoprost   F/U with HRT / AR/MR/BAT

## 2019-07-10 ENCOUNTER — TELEPHONE (OUTPATIENT)
Dept: OPHTHALMOLOGY | Facility: CLINIC | Age: 64
End: 2019-07-10

## 2019-10-10 DIAGNOSIS — Z12.39 BREAST CANCER SCREENING: Primary | ICD-10-CM

## 2019-11-13 DIAGNOSIS — Z00.00 GENERAL MEDICAL EXAM: Primary | ICD-10-CM

## 2019-11-15 ENCOUNTER — OFFICE VISIT (OUTPATIENT)
Dept: INTERNAL MEDICINE | Facility: CLINIC | Age: 64
End: 2019-11-15
Payer: COMMERCIAL

## 2019-11-15 ENCOUNTER — HOSPITAL ENCOUNTER (OUTPATIENT)
Dept: RADIOLOGY | Facility: HOSPITAL | Age: 64
Discharge: HOME | End: 2019-11-15
Attending: INTERNAL MEDICINE
Payer: COMMERCIAL

## 2019-11-15 DIAGNOSIS — Z12.39 BREAST CANCER SCREENING: ICD-10-CM

## 2019-11-15 DIAGNOSIS — Z00.00 ROUTINE GENERAL MEDICAL EXAMINATION AT A HEALTH CARE FACILITY: Primary | ICD-10-CM

## 2019-11-15 PROCEDURE — 99397 PR PREVENTIVE VISIT,EST,65 & OVER: ICD-10-PCS | Mod: S$GLB,,, | Performed by: INTERNAL MEDICINE

## 2019-11-15 PROCEDURE — 99397 PER PM REEVAL EST PAT 65+ YR: CPT | Mod: S$GLB,,, | Performed by: INTERNAL MEDICINE

## 2019-11-15 PROCEDURE — 3008F PR BODY MASS INDEX (BMI) DOCUMENTED: ICD-10-PCS | Mod: CPTII,S$GLB,, | Performed by: INTERNAL MEDICINE

## 2019-11-15 PROCEDURE — 1101F PT FALLS ASSESS-DOCD LE1/YR: CPT | Mod: CPTII,S$GLB,, | Performed by: INTERNAL MEDICINE

## 2019-11-15 PROCEDURE — 77063 BREAST TOMOSYNTHESIS BI: CPT | Mod: 26,,, | Performed by: RADIOLOGY

## 2019-11-15 PROCEDURE — 77067 SCR MAMMO BI INCL CAD: CPT | Mod: TC,PO

## 2019-11-15 PROCEDURE — 77063 MAMMO DIGITAL SCREENING BILAT WITH TOMOSYNTHESIS_CAD: ICD-10-PCS | Mod: 26,,, | Performed by: RADIOLOGY

## 2019-11-15 PROCEDURE — 1101F PR PT FALLS ASSESS DOC 0-1 FALLS W/OUT INJ PAST YR: ICD-10-PCS | Mod: CPTII,S$GLB,, | Performed by: INTERNAL MEDICINE

## 2019-11-15 PROCEDURE — 99999 PR PBB SHADOW E&M-EST. PATIENT-LVL III: CPT | Mod: PBBFAC,,, | Performed by: INTERNAL MEDICINE

## 2019-11-15 PROCEDURE — 99999 PR PBB SHADOW E&M-EST. PATIENT-LVL III: ICD-10-PCS | Mod: PBBFAC,,, | Performed by: INTERNAL MEDICINE

## 2019-11-15 PROCEDURE — 3008F BODY MASS INDEX DOCD: CPT | Mod: CPTII,S$GLB,, | Performed by: INTERNAL MEDICINE

## 2019-11-15 PROCEDURE — 77067 SCR MAMMO BI INCL CAD: CPT | Mod: 26,,, | Performed by: RADIOLOGY

## 2019-11-15 PROCEDURE — 77067 MAMMO DIGITAL SCREENING BILAT WITH TOMOSYNTHESIS_CAD: ICD-10-PCS | Mod: 26,,, | Performed by: RADIOLOGY

## 2019-11-19 ENCOUNTER — TELEPHONE (OUTPATIENT)
Dept: RADIOLOGY | Facility: HOSPITAL | Age: 64
End: 2019-11-19

## 2019-11-19 NOTE — TELEPHONE ENCOUNTER
Spoke with patient and explained mammogram findings.Patient expressed understanding of results. Patient scheduled abnormal mammogram follow up appointment at [facility] on 11/22/2019.

## 2019-11-22 ENCOUNTER — HOSPITAL ENCOUNTER (OUTPATIENT)
Dept: RADIOLOGY | Facility: HOSPITAL | Age: 64
Discharge: HOME | End: 2019-11-22
Attending: INTERNAL MEDICINE
Payer: COMMERCIAL

## 2019-11-22 ENCOUNTER — TELEPHONE (OUTPATIENT)
Dept: RADIOLOGY | Facility: HOSPITAL | Age: 64
End: 2019-11-22

## 2019-11-22 DIAGNOSIS — R92.8 ABNORMAL MAMMOGRAM: ICD-10-CM

## 2019-11-22 PROCEDURE — 77061 MAMMO DIGITAL DIAGNOSTIC LEFT WITH TOMOSYNTHESIS_CAD: ICD-10-PCS | Mod: 26,LT,, | Performed by: RADIOLOGY

## 2019-11-22 PROCEDURE — 77061 BREAST TOMOSYNTHESIS UNI: CPT | Mod: 26,LT,, | Performed by: RADIOLOGY

## 2019-11-22 PROCEDURE — 77061 BREAST TOMOSYNTHESIS UNI: CPT | Mod: TC,PO,LT

## 2019-11-22 PROCEDURE — 77065 DX MAMMO INCL CAD UNI: CPT | Mod: 26,LT,, | Performed by: RADIOLOGY

## 2019-11-22 PROCEDURE — 77065 MAMMO DIGITAL DIAGNOSTIC LEFT WITH TOMOSYNTHESIS_CAD: ICD-10-PCS | Mod: 26,LT,, | Performed by: RADIOLOGY

## 2019-11-22 PROCEDURE — 77065 DX MAMMO INCL CAD UNI: CPT | Mod: TC,PO,LT

## 2019-11-22 NOTE — TELEPHONE ENCOUNTER
Spoke with patient. Reviewed breast biopsy procedure and reviewed instructions for breast biopsy. Patient expressed understanding and all questions were answered. Provided patient with my phone number to call for any further concerns or questions.   Patient scheduled breast biopsy at the [facility] on 12/10/2019.

## 2019-12-04 VITALS
WEIGHT: 141.81 LBS | SYSTOLIC BLOOD PRESSURE: 114 MMHG | DIASTOLIC BLOOD PRESSURE: 74 MMHG | BODY MASS INDEX: 23.6 KG/M2 | HEART RATE: 64 BPM

## 2019-12-04 NOTE — PROGRESS NOTES
Subjective:       Patient ID: Dawit Carrion is a 64 y.o. female.    Chief Complaint: Annual Exam    Héctor Carrion is here today for her annual exam. Overall doing well and had no specific complaints. She is s/p R total hip replacement in May of this year and has done well since then. She still has some discomfort with prolonged walks and standing, but not limiting her activities. I advised she follow up with her Ortho doctor as indicated. Otherwise doing well.  I gave her copies of her studies done earlier and discussed them in detail including blood work that showed elevated cholesterol level at 250. This is a new finding and she admits to recent dietary indiscretions and will resume normal diet. MMG showed L breast calcifications for which a bx is scheduled. All other parameters were unremarkable.  Review of Systems   All other systems reviewed and are negative.      Objective:      Physical Exam   Constitutional: She is oriented to person, place, and time. She appears well-developed and well-nourished. No distress.   HENT:   Head: Normocephalic and atraumatic.   Right Ear: External ear normal.   Left Ear: External ear normal.   Mouth/Throat: Oropharynx is clear and moist. No oropharyngeal exudate.   Eyes: Pupils are equal, round, and reactive to light. Conjunctivae and EOM are normal. Right eye exhibits no discharge. Left eye exhibits no discharge. No scleral icterus.   Neck: Normal range of motion. Neck supple. No JVD present. No thyromegaly present.   Cardiovascular: Normal rate, regular rhythm, normal heart sounds and intact distal pulses.   No murmur heard.  Pulmonary/Chest: Effort normal and breath sounds normal. No respiratory distress. She has no wheezes. She exhibits no tenderness.   Abdominal: Soft. Bowel sounds are normal. She exhibits no distension and no mass. There is no tenderness.   Musculoskeletal: Normal range of motion. She exhibits no edema.   Mld R hips bursa tenderness with adequate  ROM.   Lymphadenopathy:     She has no cervical adenopathy.   Neurological: She is alert and oriented to person, place, and time. No cranial nerve deficit. Coordination normal.   Skin: Skin is warm and dry. No rash noted. She is not diaphoretic. No erythema.   Psychiatric: She has a normal mood and affect. Her behavior is normal. Judgment and thought content normal.   Vitals reviewed.      Assessment:        1. Routine general medical examination at a health care facility     2.    Hypercholesterolemia - new finding.   3.    Abnormal MMG.   4.    S/P R SKIP.   Plan:    1.  Continue with current medications.         2. Follow up with local Orthopod.         3. Breast core bx scheduled as per [facility].         4. Repeat lipids in 3 months at home.         5. RTC 1 year or sooner if needed.

## 2019-12-10 ENCOUNTER — HOSPITAL ENCOUNTER (OUTPATIENT)
Dept: RADIOLOGY | Facility: HOSPITAL | Age: 64
Discharge: HOME | End: 2019-12-10
Attending: INTERNAL MEDICINE
Payer: COMMERCIAL

## 2019-12-10 DIAGNOSIS — R92.8 ABNORMAL MAMMOGRAM: ICD-10-CM

## 2019-12-10 PROCEDURE — 19081 BX BREAST 1ST LESION STRTCTC: CPT | Mod: PO,LT

## 2019-12-10 PROCEDURE — 19081 MAMMO BREAST STEREOTACTIC BREAST BIOPSY LEFT: ICD-10-PCS | Mod: LT,,, | Performed by: RADIOLOGY

## 2019-12-10 PROCEDURE — 19081 BX BREAST 1ST LESION STRTCTC: CPT | Mod: LT,,, | Performed by: RADIOLOGY

## 2019-12-10 PROCEDURE — 88305 TISSUE EXAM BY PATHOLOGIST: CPT | Mod: 26,,, | Performed by: PATHOLOGY

## 2019-12-10 PROCEDURE — 25000003 PHARM REV CODE 250: Mod: PO | Performed by: INTERNAL MEDICINE

## 2019-12-10 PROCEDURE — 88305 TISSUE EXAM BY PATHOLOGIST: CPT | Performed by: PATHOLOGY

## 2019-12-10 PROCEDURE — 88305 TISSUE EXAM BY PATHOLOGIST: ICD-10-PCS | Mod: 26,,, | Performed by: PATHOLOGY

## 2019-12-10 RX ORDER — LIDOCAINE HYDROCHLORIDE AND EPINEPHRINE 20; 10 MG/ML; UG/ML
8 INJECTION, SOLUTION INFILTRATION; PERINEURAL ONCE
Status: COMPLETED | OUTPATIENT
Start: 2019-12-10 | End: 2019-12-10

## 2019-12-10 RX ORDER — LIDOCAINE HYDROCHLORIDE 10 MG/ML
4 INJECTION, SOLUTION EPIDURAL; INFILTRATION; INTRACAUDAL; PERINEURAL ONCE
Status: COMPLETED | OUTPATIENT
Start: 2019-12-10 | End: 2019-12-10

## 2019-12-10 RX ADMIN — LIDOCAINE HYDROCHLORIDE AND EPINEPHRINE 8 ML: 20; 10 INJECTION, SOLUTION INFILTRATION; PERINEURAL at 10:12

## 2019-12-10 RX ADMIN — LIDOCAINE HYDROCHLORIDE 40 MG: 10 INJECTION, SOLUTION EPIDURAL; INFILTRATION; INTRACAUDAL; PERINEURAL at 10:12

## 2019-12-13 LAB
FINAL PATHOLOGIC DIAGNOSIS: NORMAL
GROSS: NORMAL

## 2019-12-16 ENCOUNTER — DOCUMENTATION ONLY (OUTPATIENT)
Dept: SURGERY | Facility: CLINIC | Age: 64
End: 2019-12-16

## 2019-12-16 NOTE — PROGRESS NOTES
Called Patient with results of breast biopsy from 12/10/2019.  Explained that the biopsy showed ADH . Discussed what this means and that the next step is to meet with a breast surgeon. An appt was made for 12/26/2019 with Dr. Faith.  Reviewed location of breast center. Patient verbalized understanding.

## 2019-12-26 ENCOUNTER — OFFICE VISIT (OUTPATIENT)
Dept: SURGERY | Facility: CLINIC | Age: 64
End: 2019-12-26
Payer: COMMERCIAL

## 2019-12-26 ENCOUNTER — HOSPITAL ENCOUNTER (OUTPATIENT)
Dept: CARDIOLOGY | Facility: CLINIC | Age: 64
Discharge: HOME | End: 2019-12-26
Payer: COMMERCIAL

## 2019-12-26 VITALS
WEIGHT: 142.5 LBS | SYSTOLIC BLOOD PRESSURE: 123 MMHG | HEART RATE: 85 BPM | BODY MASS INDEX: 23.74 KG/M2 | HEIGHT: 65 IN | DIASTOLIC BLOOD PRESSURE: 72 MMHG

## 2019-12-26 DIAGNOSIS — N60.92 ATYPICAL DUCTAL HYPERPLASIA OF LEFT BREAST: ICD-10-CM

## 2019-12-26 DIAGNOSIS — Z01.818 PRE-OP TESTING: ICD-10-CM

## 2019-12-26 DIAGNOSIS — Z01.818 PRE-OP TESTING: Primary | ICD-10-CM

## 2019-12-26 PROCEDURE — 93010 EKG 12-LEAD: ICD-10-PCS | Mod: S$GLB,,, | Performed by: INTERNAL MEDICINE

## 2019-12-26 PROCEDURE — 3288F FALL RISK ASSESSMENT DOCD: CPT | Mod: CPTII,S$GLB,, | Performed by: SURGERY

## 2019-12-26 PROCEDURE — 99203 PR OFFICE/OUTPT VISIT, NEW, LEVL III, 30-44 MIN: ICD-10-PCS | Mod: S$GLB,,, | Performed by: SURGERY

## 2019-12-26 PROCEDURE — 99999 PR PBB SHADOW E&M-EST. PATIENT-LVL III: CPT | Mod: PBBFAC,,, | Performed by: SURGERY

## 2019-12-26 PROCEDURE — 1100F PR PT FALLS ASSESS DOC 2+ FALLS/FALL W/INJURY/YR: ICD-10-PCS | Mod: CPTII,S$GLB,, | Performed by: SURGERY

## 2019-12-26 PROCEDURE — 93005 ELECTROCARDIOGRAM TRACING: CPT | Mod: S$GLB,,, | Performed by: SURGERY

## 2019-12-26 PROCEDURE — 3008F PR BODY MASS INDEX (BMI) DOCUMENTED: ICD-10-PCS | Mod: CPTII,S$GLB,, | Performed by: SURGERY

## 2019-12-26 PROCEDURE — 99203 OFFICE O/P NEW LOW 30 MIN: CPT | Mod: S$GLB,,, | Performed by: SURGERY

## 2019-12-26 PROCEDURE — 3008F BODY MASS INDEX DOCD: CPT | Mod: CPTII,S$GLB,, | Performed by: SURGERY

## 2019-12-26 PROCEDURE — 3288F PR FALLS RISK ASSESSMENT DOCUMENTED: ICD-10-PCS | Mod: CPTII,S$GLB,, | Performed by: SURGERY

## 2019-12-26 PROCEDURE — 93005 EKG 12-LEAD: ICD-10-PCS | Mod: S$GLB,,, | Performed by: SURGERY

## 2019-12-26 PROCEDURE — 93010 ELECTROCARDIOGRAM REPORT: CPT | Mod: S$GLB,,, | Performed by: INTERNAL MEDICINE

## 2019-12-26 PROCEDURE — 99999 PR PBB SHADOW E&M-EST. PATIENT-LVL III: ICD-10-PCS | Mod: PBBFAC,,, | Performed by: SURGERY

## 2019-12-26 PROCEDURE — 1100F PTFALLS ASSESS-DOCD GE2>/YR: CPT | Mod: CPTII,S$GLB,, | Performed by: SURGERY

## 2019-12-26 NOTE — H&P (VIEW-ONLY)
History and Physical  [facility]  Department of Surgery    REFERRING PROVIDER: Jas Aguilar Md  [address] [place] [address]    CHIEF COMPLAINT: atypia of the left breast    Subjective:      Dawit Carrion is a 64 y.o. postmenopausal female referred for evaluation of atypia of the left breast noted on core needle biopsy.  Patient went for routine screening mammogram on 11/15/19.  Diagnostics mammogram performed on 19 showed an area of calcifications in the left breast at 1 o'clock, posterior depth measuring 5mm in size. A stereotactic biopsy was performed on 12/10/19.  Pathology demonstrated atypical ductal hyperplasia.    Patient does not routinely do self breast exams.  Patient has not noted a change on breast exam.  Patient denies nipple discharge. Patient denies to previous breast biopsy. Patient denies a personal history of breast cancer.    GYN History:  Age of menarche was 11. Age of menopause was 48 after TAHBSO. Patient admits to hormonal therapy (has been on progesterone for past 5 years). Patient is . Age of first live birth was 25. Patient did breast feed.      FAMILY History:  Maternal cousin - breast cancer  Sister - thyroid cancer    Past Medical History:   Diagnosis Date    Cataract     Glaucoma     Hyperlipidemia      Past Surgical History:   Procedure Laterality Date    CHOLECYSTECTOMY      HYSTERECTOMY      TUBAL LIGATION       Current Outpatient Medications on File Prior to Visit   Medication Sig Dispense Refill    b complex vitamins capsule       buPROPion (WELLBUTRIN XL) 300 MG 24 hr tablet Take 300 mg by mouth once daily.        estradiol (MINIVELLE) 0.0375 mg/24 hr       latanoprost 0.005 % ophthalmic solution Place 1 drop into both eyes every evening. 2.5 mL 12    progesterone (PROMETRIUM) 100 MG capsule Take 100 mg by mouth once daily.      progesterone 100 mg Supp       testosterone cypionate (DEPO-TESTOSTERONE) 100  mg/mL injection       testosterone cypionate (DEPOTESTOTERONE CYPIONATE) 100 mg/mL injection Inject into the muscle every 14 (fourteen) days.      testosterone cypionate (DEPOTESTOTERONE CYPIONATE) 200 mg/mL injection Inject into the muscle every 14 (fourteen) days.      [DISCONTINUED] buPROPion (WELLBUTRIN XL) 300 MG 24 hr tablet 150 mg once daily.        No current facility-administered medications on file prior to visit.      Social History     Socioeconomic History    Marital status:      Spouse name: Not on file    Number of children: Not on file    Years of education: Not on file    Highest education level: Not on file   Occupational History    Not on file   Social Needs    Financial resource strain: Not hard at all    Food insecurity:     Worry: Never true     Inability: Never true    Transportation needs:     Medical: No     Non-medical: No   Tobacco Use    Smoking status: Former Smoker    Tobacco comment: quit 27 years ago   Substance and Sexual Activity    Alcohol use: No     Frequency: Never     Binge frequency: Never    Drug use: No    Sexual activity: Not on file   Lifestyle    Physical activity:     Days per week: 3 days     Minutes per session: 30 min    Stress: To some extent   Relationships    Social connections:     Talks on phone: More than three times a week     Gets together: More than three times a week     Attends Anabaptism service: Not on file     Active member of club or organization: No     Attends meetings of clubs or organizations: Never     Relationship status:    Other Topics Concern    Not on file   Social History Narrative    Not on file     Family History   Problem Relation Age of Onset    Cancer Mother     Heart disease Father     Cancer Sister     Glaucoma Brother        Review of Systems  A comprehensive review of systems was negative.       Objective:        /72 (BP Location: Left arm, Patient Position: Sitting, BP Method: Medium  "(Automatic))   Pulse 85   Ht 5' 5" (1.651 m)   Wt 64.7 kg (142 lb 8.4 oz)   BMI 23.72 kg/m²     General Appearance:    Alert, cooperative, no distress, appears stated age   Head:    Normocephalic, without obvious abnormality, atraumatic   Eyes:    PERRL, lids normal   Neck:   Supple, symmetrical, no adenopathy   Lungs:     respirations unlabored; no obvious deformity   Chest Wall:    No tenderness or deformity   Heart::   Regular rate and rhythm   Abdomen:     Soft, non-tender, nondistended   Extremities:   Extremities normal, atraumatic   Skin:   Skin color, texture, turgor normal, no rashes or lesions   Lymph nodes:   No Cervical or supraclavicular adenopathy   Neuro/Psych:   Alert and oriented, good judgement   BREAST exam:  Left: no masses, skin changes. No nipple discharge or inverted nipple. No axillary LAD. Ecchymosis in the UOQ from recent biopsy  Right: no masses, skin changes. No nipple discharge or inverted nipple.  No axillary LAD    Radiology review: Images personally reviewed by me in the clinic.  Result:   Mammo Digital Diagnostic Left w/ Garth     History:  Patient is 64 y.o. and is seen for a diagnostic mammogram.     Films Compared:  Prior images (if available) were compared.     Findings:  This procedure was performed using tomosynthesis.  Computer-aided detection was utilized in the interpretation of this examination.  The left breast has scattered areas of fibroglandular density.     There are amorphous calcifications seen in the upper outer quadrant of the left breast at 1 o'clock in the posterior depth. Maximum diameter of cluster = 5 mm.  Findings and recommendation were discussed with the patient at the time of the procedure.      Impression:  Left  Calcifications: Left breast calcifications at the upper outer posterior 1 o'clock position. Assessment: 4 - Suspicious finding. Biopsy is recommended.      BI-RADS Category:   Left: 4 - Suspicious  Overall: 4 - Suspicious   "   Recommendation:  Stereotactic Core Needle Biopsy is recommended    Assessment:      Dawit Carrion is a 64 y.o. postmenopausal female with atypia of the left breast     Plan:   We discussed the options for management of atypia. We discussed with atypia, there is an increase in the risk of breast cancer.  We discussed that there a options for reducing that risk with chemoprevention using Tamoxifen or aromatase inhibitor therapy.  We also discussed active surveillance and screening in the future in an at high risk for breast cancer setting with use of annual screening breast MRI.     Left breast Magseed excisional biopsy on 01/22/20  Recommended stopping her current HRT    Jw Farfan MD  General Surgery PGYIII  [phone]    I have personally taken the history and examined this patient and agree with the resident's note as stated above.  The patient presented with a 5 mm area of microcalcifications in the left breast upper outer quadrant. Core needle biopsy revealed atypical ductal hyperplasia.  The patient is currently on estradiol, progesterone, and testosterone and I have advised her to discontinue the estradiol and progesterone component of her hormone replacement therapy.  She has been scheduled for a left breast seed localization excisional breast biopsy for the atypical ductal hyperplasia on January 22, 2020.  She requests that her seed marker be placed on Monday 01/13/2020.  Her Leanna Model Risk Assessment score with the atypical ductal hyperplasia reveals a lifetime risk of 15.2% so she may be a candidate for consideration for endocrine therapy for chemoprevention.  Her Tyrer-Cuzick score calculates out at 29% lifetime risk so she may be a candidate for annual screening breast MRIs in the future alternating every 6 months with annual screening digital mammography bilaterally with tomosynthesis.    She is consented and scheduled for a seed localization excisional left breast biopsy of the LUOQ for the area  of atypical ductal hyperplasia on 01-22-10.

## 2019-12-26 NOTE — PROGRESS NOTES
History and Physical  [facility]  Department of Surgery    REFERRING PROVIDER: Jas Aguilar Md  [address] [place] [address]    CHIEF COMPLAINT: atypia of the left breast    Subjective:      Dawit Carrion is a 64 y.o. postmenopausal female referred for evaluation of atypia of the left breast noted on core needle biopsy.  Patient went for routine screening mammogram on 11/15/19.  Diagnostics mammogram performed on 19 showed an area of calcifications in the left breast at 1 o'clock, posterior depth measuring 5mm in size. A stereotactic biopsy was performed on 12/10/19.  Pathology demonstrated atypical ductal hyperplasia.    Patient does not routinely do self breast exams.  Patient has not noted a change on breast exam.  Patient denies nipple discharge. Patient denies to previous breast biopsy. Patient denies a personal history of breast cancer.    GYN History:  Age of menarche was 11. Age of menopause was 48 after TAHBSO. Patient admits to hormonal therapy (has been on progesterone for past 5 years). Patient is . Age of first live birth was 25. Patient did breast feed.      FAMILY History:  Maternal cousin - breast cancer  Sister - thyroid cancer    Past Medical History:   Diagnosis Date    Cataract     Glaucoma     Hyperlipidemia      Past Surgical History:   Procedure Laterality Date    CHOLECYSTECTOMY      HYSTERECTOMY      TUBAL LIGATION       Current Outpatient Medications on File Prior to Visit   Medication Sig Dispense Refill    b complex vitamins capsule       buPROPion (WELLBUTRIN XL) 300 MG 24 hr tablet Take 300 mg by mouth once daily.        estradiol (MINIVELLE) 0.0375 mg/24 hr       latanoprost 0.005 % ophthalmic solution Place 1 drop into both eyes every evening. 2.5 mL 12    progesterone (PROMETRIUM) 100 MG capsule Take 100 mg by mouth once daily.      progesterone 100 mg Supp       testosterone cypionate (DEPO-TESTOSTERONE) 100  mg/mL injection       testosterone cypionate (DEPOTESTOTERONE CYPIONATE) 100 mg/mL injection Inject into the muscle every 14 (fourteen) days.      testosterone cypionate (DEPOTESTOTERONE CYPIONATE) 200 mg/mL injection Inject into the muscle every 14 (fourteen) days.      [DISCONTINUED] buPROPion (WELLBUTRIN XL) 300 MG 24 hr tablet 150 mg once daily.        No current facility-administered medications on file prior to visit.      Social History     Socioeconomic History    Marital status:      Spouse name: Not on file    Number of children: Not on file    Years of education: Not on file    Highest education level: Not on file   Occupational History    Not on file   Social Needs    Financial resource strain: Not hard at all    Food insecurity:     Worry: Never true     Inability: Never true    Transportation needs:     Medical: No     Non-medical: No   Tobacco Use    Smoking status: Former Smoker    Tobacco comment: quit 27 years ago   Substance and Sexual Activity    Alcohol use: No     Frequency: Never     Binge frequency: Never    Drug use: No    Sexual activity: Not on file   Lifestyle    Physical activity:     Days per week: 3 days     Minutes per session: 30 min    Stress: To some extent   Relationships    Social connections:     Talks on phone: More than three times a week     Gets together: More than three times a week     Attends Scientologist service: Not on file     Active member of club or organization: No     Attends meetings of clubs or organizations: Never     Relationship status:    Other Topics Concern    Not on file   Social History Narrative    Not on file     Family History   Problem Relation Age of Onset    Cancer Mother     Heart disease Father     Cancer Sister     Glaucoma Brother        Review of Systems  A comprehensive review of systems was negative.       Objective:        /72 (BP Location: Left arm, Patient Position: Sitting, BP Method: Medium  "(Automatic))   Pulse 85   Ht 5' 5" (1.651 m)   Wt 64.7 kg (142 lb 8.4 oz)   BMI 23.72 kg/m²     General Appearance:    Alert, cooperative, no distress, appears stated age   Head:    Normocephalic, without obvious abnormality, atraumatic   Eyes:    PERRL, lids normal   Neck:   Supple, symmetrical, no adenopathy   Lungs:     respirations unlabored; no obvious deformity   Chest Wall:    No tenderness or deformity   Heart::   Regular rate and rhythm   Abdomen:     Soft, non-tender, nondistended   Extremities:   Extremities normal, atraumatic   Skin:   Skin color, texture, turgor normal, no rashes or lesions   Lymph nodes:   No Cervical or supraclavicular adenopathy   Neuro/Psych:   Alert and oriented, good judgement   BREAST exam:  Left: no masses, skin changes. No nipple discharge or inverted nipple. No axillary LAD. Ecchymosis in the UOQ from recent biopsy  Right: no masses, skin changes. No nipple discharge or inverted nipple.  No axillary LAD    Radiology review: Images personally reviewed by me in the clinic.  Result:   Mammo Digital Diagnostic Left w/ Garth     History:  Patient is 64 y.o. and is seen for a diagnostic mammogram.     Films Compared:  Prior images (if available) were compared.     Findings:  This procedure was performed using tomosynthesis.  Computer-aided detection was utilized in the interpretation of this examination.  The left breast has scattered areas of fibroglandular density.     There are amorphous calcifications seen in the upper outer quadrant of the left breast at 1 o'clock in the posterior depth. Maximum diameter of cluster = 5 mm.  Findings and recommendation were discussed with the patient at the time of the procedure.      Impression:  Left  Calcifications: Left breast calcifications at the upper outer posterior 1 o'clock position. Assessment: 4 - Suspicious finding. Biopsy is recommended.      BI-RADS Category:   Left: 4 - Suspicious  Overall: 4 - Suspicious   "   Recommendation:  Stereotactic Core Needle Biopsy is recommended    Assessment:      Dawit Carrion is a 64 y.o. postmenopausal female with atypia of the left breast     Plan:   We discussed the options for management of atypia. We discussed with atypia, there is an increase in the risk of breast cancer.  We discussed that there a options for reducing that risk with chemoprevention using Tamoxifen or aromatase inhibitor therapy.  We also discussed active surveillance and screening in the future in an at high risk for breast cancer setting with use of annual screening breast MRI.     Left breast Magseed excisional biopsy on 01/22/20  Recommended stopping her current HRT    Jw Farfan MD  General Surgery PGYIII  [phone]    I have personally taken the history and examined this patient and agree with the resident's note as stated above.  The patient presented with a 5 mm area of microcalcifications in the left breast upper outer quadrant. Core needle biopsy revealed atypical ductal hyperplasia.  The patient is currently on estradiol, progesterone, and testosterone and I have advised her to discontinue the estradiol and progesterone component of her hormone replacement therapy.  She has been scheduled for a left breast seed localization excisional breast biopsy for the atypical ductal hyperplasia on January 22, 2020.  She requests that her seed marker be placed on Monday 01/13/2020.  Her Leanna Model Risk Assessment score with the atypical ductal hyperplasia reveals a lifetime risk of 15.2% so she may be a candidate for consideration for endocrine therapy for chemoprevention.  Her Tyrer-Cuzick score calculates out at 29% lifetime risk so she may be a candidate for annual screening breast MRIs in the future alternating every 6 months with annual screening digital mammography bilaterally with tomosynthesis.    She is consented and scheduled for a seed localization excisional left breast biopsy of the LUOQ for the area  of atypical ductal hyperplasia on 01-22-10.

## 2019-12-26 NOTE — LETTER
December 27, 2019      Jas Aguilar MD  [address]  [facility] [address]           [facility]  [address]  [facility] [address]  Phone: [phone]  Fax: [phone]          Patient: Dawit Carrion   MR Number: [id number]   YOB: 1955   Date of Visit: 12/26/2019       Dear Dr. Jas Aguilar:    Thank you for referring Dawit Carrion to me for evaluation. Attached you will find relevant portions of my assessment and plan of care.    If you have questions, please do not hesitate to call me. I look forward to following Dawit Carrion along with you.    Sincerely,    Raj Faith MD    Enclosure  CC:  No Recipients    If you would like to receive this communication electronically, please contact [email or web address] or [phone] to request more information on [facility] access.    For providers and/or their staff who would like to refer a patient to [facility], please contact us through our one-stop-shop provider referral line, [facility], at [phone].    If you feel you have received this communication in error or would no longer like to receive these types of communications, please e-mail [email or web address]

## 2019-12-30 DIAGNOSIS — N60.92 ATYPICAL DUCTAL HYPERPLASIA OF LEFT BREAST: Primary | ICD-10-CM

## 2020-01-13 ENCOUNTER — PATIENT MESSAGE (OUTPATIENT)
Dept: SURGERY | Facility: HOSPITAL | Age: 65
End: 2020-01-13

## 2020-01-13 ENCOUNTER — HOSPITAL ENCOUNTER (OUTPATIENT)
Dept: RADIOLOGY | Facility: HOSPITAL | Age: 65
Discharge: HOME | End: 2020-01-13
Attending: SURGERY
Payer: COMMERCIAL

## 2020-01-13 DIAGNOSIS — N60.92 ATYPICAL DUCTAL HYPERPLASIA OF LEFT BREAST: ICD-10-CM

## 2020-01-13 PROCEDURE — 19281 PERQ DEVICE BREAST 1ST IMAG: CPT | Mod: LT,,, | Performed by: RADIOLOGY

## 2020-01-13 PROCEDURE — A4648 IMPLANTABLE TISSUE MARKER: HCPCS | Mod: PO

## 2020-01-13 PROCEDURE — 25000003 PHARM REV CODE 250: Mod: PO | Performed by: SURGERY

## 2020-01-13 PROCEDURE — 19281 MAMMO LEFT MAGSEED LOCALIZATION: ICD-10-PCS | Mod: LT,,, | Performed by: RADIOLOGY

## 2020-01-13 RX ORDER — LIDOCAINE HYDROCHLORIDE 20 MG/ML
10 INJECTION, SOLUTION INFILTRATION; PERINEURAL ONCE
Status: DISCONTINUED | OUTPATIENT
Start: 2020-01-13 | End: 2020-01-14 | Stop reason: HOSPADM

## 2020-01-13 RX ORDER — LIDOCAINE HYDROCHLORIDE 20 MG/ML
5 INJECTION, SOLUTION INFILTRATION; PERINEURAL ONCE
Status: COMPLETED | OUTPATIENT
Start: 2020-01-13 | End: 2020-01-13

## 2020-01-13 RX ADMIN — LIDOCAINE HYDROCHLORIDE 5 ML: 20 INJECTION, SOLUTION INFILTRATION; PERINEURAL at 08:01

## 2020-01-21 ENCOUNTER — TELEPHONE (OUTPATIENT)
Dept: SURGERY | Facility: CLINIC | Age: 65
End: 2020-01-21

## 2020-01-21 ENCOUNTER — ANESTHESIA EVENT (OUTPATIENT)
Dept: SURGERY | Facility: HOSPITAL | Age: 65
End: 2020-01-21
Payer: COMMERCIAL

## 2020-01-21 RX ORDER — AMOXICILLIN 500 MG/1
500 CAPSULE ORAL DAILY
COMMUNITY
End: 2023-08-29

## 2020-01-21 NOTE — ANESTHESIA PREPROCEDURE EVALUATION
01/21/2020  Dawit Carrion is a 64 y.o., female.    Anesthesia Evaluation         Review of Systems  Anesthesia Hx:  No problems with previous Anesthesia Hx of Anesthetic complications       ponv         Personal Hx of Anesthesia complications, Post-Operative Nausea/Vomiting, with every anesthetic, treatment not known Slow To Awaken/Delayed Emergence and moderate, did not delay extubation, but prolonged PACU stay   Social:  Non-Smoker   Cardiovascular:   Exercise tolerance: good Denies Hypertension.  Denies CAD.     Denies Angina.  Functional Capacity Can you climb two flights of stairs? ==> Yes    Pulmonary:   Denies Asthma.  Denies Recent URI.  Denies Sleep Apnea.    Renal/:  Renal/ Normal     Hepatic/GI:   Denies PUD. Denies Hiatal Hernia.  Denies GERD. Denies Liver Disease.  Denies Hepatitis.    Neurological:   Denies CVA. Denies Seizures.    Endocrine:   Denies Diabetes. Denies Hypothyroidism.        Physical Exam  General:  Well nourished    Airway/Jaw/Neck:  Airway Findings: Mouth Opening: Normal Tongue: Normal  General Airway Assessment: Adult  Mallampati: I  TM Distance: Normal, at least 6 cm  Jaw/Neck Findings:  Neck ROM: Normal ROM  Neck Findings:     Eyes/Ears/Nose:  EYES/EARS/NOSE FINDINGS: Normal   Dental:  Dental Findings: In tact, Upper Dentures, Lower Dentures   Chest/Lungs:  Chest/Lungs Findings: Clear to auscultation     Heart/Vascular:  Heart Findings: Rate: Normal  Rhythm: Regular Rhythm  Sounds: Normal        Mental Status:  Mental Status Findings:  Alert and Oriented         Anesthesia Plan  Type of Anesthesia, risks & benefits discussed:  Anesthesia Type:  general  Patient's Preference: Proceed with anesthesia understanding that the risks are very small but could be serious or life threatening.  Intra-op Monitoring Plan: standard ASA monitors  Intra-op Monitoring Plan  Comments:   Post Op Pain Control Plan:   Post Op Pain Control Plan Comments:   Induction:   IV  Beta Blocker:  Patient is not currently on a Beta-Blocker (No further documentation required).       Informed Consent: Patient understands risks and agrees with Anesthesia plan.  Questions answered. Anesthesia consent signed with patient.  ASA Score: 1     Day of Surgery Review of History & Physical: I have interviewed and examined the patient. I have reviewed the patient's H&P dated:            Ready For Surgery From Anesthesia Perspective.

## 2020-01-21 NOTE — TELEPHONE ENCOUNTER
Spoke to Patient.  Arrival time of 1200 given to check in at the Surgery Center on the 2nd Floor of the Hospital on [facility].  Directions given to the Surgery Center.  Instructed that she can eat and drink until 0500, have clear liquids between [id number], and then to remain NPO after 1100.  Stated that she will follow the instructions in her Surgery Guide.  Patient verbalized understanding of instructions.

## 2020-01-21 NOTE — PRE-PROCEDURE INSTRUCTIONS
PreOp Instructions given:   - Verbal medication information (what to hold and what to take)   - NPO guidelines   - Arrival place directions given;   - Bathing with antibacterial soap   - Don't wear any jewelry or bring any valuables AM of surgery   - No makeup or moisturizer to face   - No perfume/cologne, powder, lotions or aftershave   Pt. verbalized understanding.     Pt REPORTS SEVERE PONV AND SLOW TO AWAKEN/DELAYED EMERGENCE.    ARRIVAL TO [facility] - 1200    PT'S  - LD WILL BE PROVIDING TRANSPORTATION HOME UPON DISCHARGE.

## 2020-01-22 ENCOUNTER — HOSPITAL ENCOUNTER (OUTPATIENT)
Dept: RADIOLOGY | Facility: HOSPITAL | Age: 65
Discharge: HOME | End: 2020-01-22
Attending: SURGERY
Payer: COMMERCIAL

## 2020-01-22 ENCOUNTER — ANESTHESIA (OUTPATIENT)
Dept: SURGERY | Facility: HOSPITAL | Age: 65
End: 2020-01-22
Payer: COMMERCIAL

## 2020-01-22 ENCOUNTER — HOSPITAL ENCOUNTER (OUTPATIENT)
Facility: HOSPITAL | Age: 65
Discharge: HOME | End: 2020-01-22
Attending: SURGERY | Admitting: SURGERY
Payer: COMMERCIAL

## 2020-01-22 VITALS
RESPIRATION RATE: 18 BRPM | WEIGHT: 142 LBS | TEMPERATURE: 98 F | HEART RATE: 98 BPM | OXYGEN SATURATION: 98 % | BODY MASS INDEX: 23.66 KG/M2 | SYSTOLIC BLOOD PRESSURE: 131 MMHG | HEIGHT: 65 IN | DIASTOLIC BLOOD PRESSURE: 81 MMHG

## 2020-01-22 DIAGNOSIS — N60.92 ATYPICAL DUCTAL HYPERPLASIA OF LEFT BREAST: ICD-10-CM

## 2020-01-22 PROCEDURE — 37000009 HC ANESTHESIA EA ADD 15 MINS: Performed by: SURGERY

## 2020-01-22 PROCEDURE — 71000044 HC DOSC ROUTINE RECOVERY FIRST HOUR: Performed by: SURGERY

## 2020-01-22 PROCEDURE — 88307 TISSUE EXAM BY PATHOLOGIST: CPT | Performed by: PATHOLOGY

## 2020-01-22 PROCEDURE — 36000706: Performed by: SURGERY

## 2020-01-22 PROCEDURE — 76098 X-RAY EXAM SURGICAL SPECIMEN: CPT | Mod: TC,PO

## 2020-01-22 PROCEDURE — 76098 X-RAY EXAM SURGICAL SPECIMEN: CPT | Mod: 26,,, | Performed by: RADIOLOGY

## 2020-01-22 PROCEDURE — 25000003 PHARM REV CODE 250: Performed by: SURGERY

## 2020-01-22 PROCEDURE — 27200651 HC AIRWAY, LMA: Performed by: ANESTHESIOLOGY

## 2020-01-22 PROCEDURE — D9220A PRA ANESTHESIA: Mod: CRNA,,, | Performed by: NURSE ANESTHETIST, CERTIFIED REGISTERED

## 2020-01-22 PROCEDURE — 71000015 HC POSTOP RECOV 1ST HR: Performed by: SURGERY

## 2020-01-22 PROCEDURE — 36000707: Performed by: SURGERY

## 2020-01-22 PROCEDURE — 88307 PR  SURG PATH,LEVEL V: ICD-10-PCS | Mod: 26,,, | Performed by: PATHOLOGY

## 2020-01-22 PROCEDURE — S0020 INJECTION, BUPIVICAINE HYDRO: HCPCS | Performed by: SURGERY

## 2020-01-22 PROCEDURE — D9220A PRA ANESTHESIA: ICD-10-PCS | Mod: ANES,,, | Performed by: ANESTHESIOLOGY

## 2020-01-22 PROCEDURE — 63600175 PHARM REV CODE 636 W HCPCS: Performed by: STUDENT IN AN ORGANIZED HEALTH CARE EDUCATION/TRAINING PROGRAM

## 2020-01-22 PROCEDURE — 88307 TISSUE EXAM BY PATHOLOGIST: CPT | Mod: 26,,, | Performed by: PATHOLOGY

## 2020-01-22 PROCEDURE — 63600175 PHARM REV CODE 636 W HCPCS: Performed by: NURSE ANESTHETIST, CERTIFIED REGISTERED

## 2020-01-22 PROCEDURE — 19125 PR EXCISE BREAST LES W XRAY MARKER: ICD-10-PCS | Mod: LT,,, | Performed by: SURGERY

## 2020-01-22 PROCEDURE — 19125 EXCISION BREAST LESION: CPT | Mod: LT,,, | Performed by: SURGERY

## 2020-01-22 PROCEDURE — 76098 MAMMO BREAST SPECIMEN: ICD-10-PCS | Mod: 26,,, | Performed by: RADIOLOGY

## 2020-01-22 PROCEDURE — 25000242 PHARM REV CODE 250 ALT 637 W/ HCPCS: Performed by: NURSE ANESTHETIST, CERTIFIED REGISTERED

## 2020-01-22 PROCEDURE — D9220A PRA ANESTHESIA: Mod: ANES,,, | Performed by: ANESTHESIOLOGY

## 2020-01-22 PROCEDURE — D9220A PRA ANESTHESIA: ICD-10-PCS | Mod: CRNA,,, | Performed by: NURSE ANESTHETIST, CERTIFIED REGISTERED

## 2020-01-22 PROCEDURE — 25000003 PHARM REV CODE 250: Performed by: NURSE ANESTHETIST, CERTIFIED REGISTERED

## 2020-01-22 PROCEDURE — 37000008 HC ANESTHESIA 1ST 15 MINUTES: Performed by: SURGERY

## 2020-01-22 RX ORDER — LIDOCAINE HCL/PF 100 MG/5ML
SYRINGE (ML) INTRAVENOUS
Status: DISCONTINUED | OUTPATIENT
Start: 2020-01-22 | End: 2020-01-22

## 2020-01-22 RX ORDER — DIPHENHYDRAMINE HYDROCHLORIDE 50 MG/ML
25 INJECTION INTRAMUSCULAR; INTRAVENOUS EVERY 6 HOURS PRN
Status: DISCONTINUED | OUTPATIENT
Start: 2020-01-22 | End: 2020-01-22 | Stop reason: HOSPADM

## 2020-01-22 RX ORDER — SODIUM CHLORIDE 9 MG/ML
INJECTION, SOLUTION INTRAVENOUS CONTINUOUS
Status: DISCONTINUED | OUTPATIENT
Start: 2020-01-22 | End: 2023-08-29

## 2020-01-22 RX ORDER — KETAMINE HCL IN 0.9 % NACL 50 MG/5 ML
SYRINGE (ML) INTRAVENOUS
Status: DISCONTINUED | OUTPATIENT
Start: 2020-01-22 | End: 2020-01-22

## 2020-01-22 RX ORDER — DEXAMETHASONE SODIUM PHOSPHATE 4 MG/ML
INJECTION, SOLUTION INTRA-ARTICULAR; INTRALESIONAL; INTRAMUSCULAR; INTRAVENOUS; SOFT TISSUE
Status: DISCONTINUED | OUTPATIENT
Start: 2020-01-22 | End: 2020-01-22

## 2020-01-22 RX ORDER — LIDOCAINE HYDROCHLORIDE 10 MG/ML
1 INJECTION, SOLUTION EPIDURAL; INFILTRATION; INTRACAUDAL; PERINEURAL ONCE
Status: DISCONTINUED | OUTPATIENT
Start: 2020-01-22 | End: 2023-08-29

## 2020-01-22 RX ORDER — ONDANSETRON 2 MG/ML
INJECTION INTRAMUSCULAR; INTRAVENOUS
Status: DISCONTINUED | OUTPATIENT
Start: 2020-01-22 | End: 2020-01-22

## 2020-01-22 RX ORDER — HYDROMORPHONE HYDROCHLORIDE 1 MG/ML
0.2 INJECTION, SOLUTION INTRAMUSCULAR; INTRAVENOUS; SUBCUTANEOUS EVERY 5 MIN PRN
Status: DISCONTINUED | OUTPATIENT
Start: 2020-01-22 | End: 2020-01-22 | Stop reason: HOSPADM

## 2020-01-22 RX ORDER — PROPOFOL 10 MG/ML
INJECTION, EMULSION INTRAVENOUS
Status: DISCONTINUED | OUTPATIENT
Start: 2020-01-22 | End: 2020-01-22

## 2020-01-22 RX ORDER — CEFAZOLIN SODIUM 1 G/3ML
2 INJECTION, POWDER, FOR SOLUTION INTRAMUSCULAR; INTRAVENOUS
Status: COMPLETED | OUTPATIENT
Start: 2020-01-22 | End: 2020-01-22

## 2020-01-22 RX ORDER — SCOLOPAMINE TRANSDERMAL SYSTEM 1 MG/1
PATCH, EXTENDED RELEASE TRANSDERMAL
Status: DISCONTINUED | OUTPATIENT
Start: 2020-01-22 | End: 2020-01-22

## 2020-01-22 RX ORDER — GLYCOPYRROLATE 0.2 MG/ML
INJECTION INTRAMUSCULAR; INTRAVENOUS
Status: DISCONTINUED | OUTPATIENT
Start: 2020-01-22 | End: 2020-01-22

## 2020-01-22 RX ORDER — TRAMADOL HYDROCHLORIDE 50 MG/1
50 TABLET ORAL EVERY 6 HOURS PRN
Qty: 20 TABLET | Refills: 0 | Status: SHIPPED | OUTPATIENT
Start: 2020-01-22 | End: 2021-05-26

## 2020-01-22 RX ORDER — ONDANSETRON 2 MG/ML
4 INJECTION INTRAMUSCULAR; INTRAVENOUS EVERY 12 HOURS PRN
Status: DISCONTINUED | OUTPATIENT
Start: 2020-01-22 | End: 2023-08-29

## 2020-01-22 RX ORDER — PHENYLEPHRINE HYDROCHLORIDE 10 MG/ML
INJECTION INTRAVENOUS
Status: DISCONTINUED | OUTPATIENT
Start: 2020-01-22 | End: 2020-01-22

## 2020-01-22 RX ORDER — FENTANYL CITRATE 50 UG/ML
25 INJECTION, SOLUTION INTRAMUSCULAR; INTRAVENOUS EVERY 5 MIN PRN
Status: DISCONTINUED | OUTPATIENT
Start: 2020-01-22 | End: 2020-01-22 | Stop reason: HOSPADM

## 2020-01-22 RX ORDER — MEPERIDINE HYDROCHLORIDE 50 MG/ML
12.5 INJECTION INTRAMUSCULAR; INTRAVENOUS; SUBCUTANEOUS ONCE AS NEEDED
Status: DISCONTINUED | OUTPATIENT
Start: 2020-01-22 | End: 2020-01-22 | Stop reason: HOSPADM

## 2020-01-22 RX ORDER — BUPIVACAINE HYDROCHLORIDE 5 MG/ML
INJECTION, SOLUTION EPIDURAL; INTRACAUDAL
Status: DISCONTINUED | OUTPATIENT
Start: 2020-01-22 | End: 2020-01-22 | Stop reason: HOSPADM

## 2020-01-22 RX ORDER — ALBUTEROL SULFATE 90 UG/1
AEROSOL, METERED RESPIRATORY (INHALATION)
Status: DISCONTINUED | OUTPATIENT
Start: 2020-01-22 | End: 2020-01-22

## 2020-01-22 RX ORDER — MIDAZOLAM HYDROCHLORIDE 1 MG/ML
INJECTION, SOLUTION INTRAMUSCULAR; INTRAVENOUS
Status: DISCONTINUED | OUTPATIENT
Start: 2020-01-22 | End: 2020-01-22

## 2020-01-22 RX ORDER — ONDANSETRON 2 MG/ML
4 INJECTION INTRAMUSCULAR; INTRAVENOUS ONCE AS NEEDED
Status: DISCONTINUED | OUTPATIENT
Start: 2020-01-22 | End: 2020-01-22 | Stop reason: HOSPADM

## 2020-01-22 RX ADMIN — PROPOFOL 50 MG: 10 INJECTION, EMULSION INTRAVENOUS at 01:01

## 2020-01-22 RX ADMIN — GLYCOPYRROLATE 0.1 MG: 0.2 INJECTION, SOLUTION INTRAMUSCULAR; INTRAVENOUS at 02:01

## 2020-01-22 RX ADMIN — PROPOFOL 200 MG: 10 INJECTION, EMULSION INTRAVENOUS at 01:01

## 2020-01-22 RX ADMIN — PROPOFOL 30 MG: 10 INJECTION, EMULSION INTRAVENOUS at 02:01

## 2020-01-22 RX ADMIN — ONDANSETRON 4 MG: 2 INJECTION INTRAMUSCULAR; INTRAVENOUS at 01:01

## 2020-01-22 RX ADMIN — ALBUTEROL SULFATE 4 PUFF: 90 AEROSOL, METERED RESPIRATORY (INHALATION) at 01:01

## 2020-01-22 RX ADMIN — PHENYLEPHRINE HYDROCHLORIDE 100 MCG: 10 INJECTION INTRAVENOUS at 02:01

## 2020-01-22 RX ADMIN — Medication 10 MG: at 02:01

## 2020-01-22 RX ADMIN — MIDAZOLAM HYDROCHLORIDE 2 MG: 1 INJECTION, SOLUTION INTRAMUSCULAR; INTRAVENOUS at 01:01

## 2020-01-22 RX ADMIN — SCOPALAMINE 1 PATCH: 1 PATCH, EXTENDED RELEASE TRANSDERMAL at 01:01

## 2020-01-22 RX ADMIN — LIDOCAINE HYDROCHLORIDE 100 MG: 20 INJECTION, SOLUTION INTRAVENOUS at 01:01

## 2020-01-22 RX ADMIN — SODIUM CHLORIDE: 0.9 INJECTION, SOLUTION INTRAVENOUS at 01:01

## 2020-01-22 RX ADMIN — SODIUM CHLORIDE, SODIUM GLUCONATE, SODIUM ACETATE, POTASSIUM CHLORIDE, MAGNESIUM CHLORIDE, SODIUM PHOSPHATE, DIBASIC, AND POTASSIUM PHOSPHATE: .53; .5; .37; .037; .03; .012; .00082 INJECTION, SOLUTION INTRAVENOUS at 02:01

## 2020-01-22 RX ADMIN — PROPOFOL 50 MG: 10 INJECTION, EMULSION INTRAVENOUS at 02:01

## 2020-01-22 RX ADMIN — CEFAZOLIN 2 G: 330 INJECTION, POWDER, FOR SOLUTION INTRAMUSCULAR; INTRAVENOUS at 01:01

## 2020-01-22 RX ADMIN — DEXAMETHASONE SODIUM PHOSPHATE 4 MG: 4 INJECTION, SOLUTION INTRAMUSCULAR; INTRAVENOUS at 01:01

## 2020-01-22 RX ADMIN — Medication 20 MG: at 02:01

## 2020-01-22 NOTE — TRANSFER OF CARE
"Anesthesia Transfer of Care Note    Patient: Dawit Terrazas Murali    Procedure(s) Performed: Procedure(s) (LRB):  BIOPSY, BREAST-LEFT SEED placement 1/13/20 (Left)    [facility] location: [facility]    Anesthesia Type: general    Transport from OR: Transported from OR on 6-10 L/min O2 by face mask with adequate spontaneous ventilation    Post pain: adequate analgesia    Post assessment: tolerated procedure well and no apparent anesthetic complications    Post vital signs: stable    Level of consciousness: awake, alert and oriented    Nausea/Vomiting: no nausea/vomiting    Complications: none    Transfer of care protocol was followed      Last vitals:   Visit Vitals  /69 (BP Location: Right arm, Patient Position: Lying)   Pulse 100   Temp 36.6 °C (97.9 °F) (Skin)   Resp 18   Ht 5' 5" (1.651 m)   Wt 64.4 kg (142 lb)   SpO2 100%   Breastfeeding? No   BMI 23.63 kg/m²     "

## 2020-01-22 NOTE — DISCHARGE INSTRUCTIONS
Discharge Instructions for Lumpectomy or Breast Biopsy  You just had a procedure to remove a lump or a small piece of tissue from your breast. After surgery, be sure to have an adult drive you home. Ask your healthcare provider when you will get the results of the biopsy. Will they call you or will you talk about it at your next visit?  Make a follow-up appointment as directed by your healthcare provider.  What to expect  The following are common after a lumpectomy or breast biopsy:   · Bruising and mild swelling around the incision  · Mild discomfort for a few days.  · Feeling tired for a day or so.  · Feeling anxious or down.   Diet  What to eat and drink after a lumpectomy or breast biopsy:   · Start with liquids and light, easy-to-digest foods, such as bananas and dry toast. As you feel up to it, slowly return to your normal diet.  · Drink at least 6 to 8 glasses of water or other nonalcoholic fluids a day, unless directed otherwise.  Activity  What you can do after a lumpectomy or breast biopsy:   · After the procedure, take it easy for the rest of the day.  · If you had general anesthesia, don't use machinery or power tools, drink alcohol, or make any major decisions for at least the first 24 hours.  · Ask your healthcare providers how you should care for the dressing and when you can take it off.  · You may shower and pat the incision (cut) dry, but don't soak in a tub until you see the healthcare provider again.   · Return to normal activities (including driving) in 24 hours unless otherwise instructed by your healthcare provider.   Bandage and incision care  Suggestions for care include:  · Take pain medicines as directed. Dont wait until the pain gets bad before taking them. Dont drink alcohol while on pain medicines.  · Wrap a waterproof ice pack or bag of frozen peas in a thin cloth. Place this over the bandaged incision for no longer than 20 minutes at a time. Do this as instructed by your  healthcare provider.  · Wear a comfortable, snug-fitting bra at all times, even to bed, to help keep swelling down.  · If your incision has been closed with glue, do not apply lotion, ointments, or creams to the area. Doing so can cause the glue to dissolve.  · If strips of tape have been used to close your incision, do not pull them off. Let them fall off on their own.  · If you have a gauze bandage, keep it and the wound dry for 48 hours. If the gauze bandage gets wet, replace it with a clean, dry bandage.    When to call your healthcare provider  Call your healthcare provider right away if you have any of the following:  · Vomiting or nausea that does not go away  · Fever over 100.4°F (38°C) or chills  · Foul-smelling discharge from the incision  · Pain not relieved by pain medicines  · Bleeding, warmth, redness, or hard swelling around the incision  · Chest pain or shortness of breath  Be sure you know how to get help anytime you have problems or questions, including after office hours, on weekends, and on holidays.   Date Last Reviewed: 10/31/2015  © 8451-6878 [employer]. [address] [place] [address]. All rights reserved. This information is not intended as a substitute for professional medical care. Always follow your healthcare professional's instructions.      Anesthesia: General Anesthesia     You are watched continuously during your procedure by your anesthesia provider.     Youre due to have surgery. During surgery, youll be given medicine called anesthesia or anesthetic. This will keep you comfortable and pain-free. Your anesthesia provider will use general anesthesia.  What is general anesthesia?  General anesthesia puts you into a state like deep sleep. It goes into the bloodstream (IV anesthetics), into the lungs (gas anesthetics), or both. You feel nothing during the procedure. You will not remember it. During the procedure, the anesthesia provider monitors you  continuously. He or she checks your heart rate and rhythm, blood pressure, breathing, and blood oxygen.  · IV anesthetics. IV anesthetics are given through an IV line in your arm. Theyre often given first. This is so you are asleep before a gas anesthetic is started. Some kinds of IV anesthetics relieve pain. Others relax you. Your doctor will decide which kind is best in your case.  · Gas anesthetics. Gas anesthetics are breathed into the lungs. They are often used to keep you asleep. They can be given through a facemask or a tube placed in your larynx or trachea (breathing tube).  ¨ If you have a facemask, your anesthesia provider will most likely place it over your nose and mouth while youre still awake. Youll breathe oxygen through the mask as your IV anesthetic is started. Gas anesthetic may be added through the mask.  ¨ If you have a tube in the larynx or trachea, it will be inserted into your throat after youre asleep.  Anesthesia tools and medicines  You will likely have:  · IV anesthetics. These are put into an IV line into your bloodstream.  · Gas anesthetics. You breathe these anesthetics into your lungs, where they pass into your bloodstream.  · Pulse oximeter. This is a small clip that is attached to the end of your finger. This measures your blood oxygen level.  · Electrocardiography leads (electrodes). These are small sticky pads that are placed on your chest. They record your heart rate and rhythm.  · Blood pressure cuff. This reads your blood pressure.  Risks and possible complications  General anesthesia has some risks. These include:  · Breathing problems  · Nausea and vomiting  · Sore throat or hoarseness (usually temporary)  · Allergic reaction to the anesthetic  · Irregular heartbeat (rare)  · Cardiac arrest (rare)   Anesthesia safety  · Follow all instructions you are given for how long not to eat or drink before your procedure.  · Be sure your doctor knows what medicines and drugs you  take. This includes over-the-counter medicines, herbs, supplements, alcohol or other drugs. You will be asked when those were last taken.  · Have an adult family member or friend drive you home after the procedure.  · For the first 24 hours after your surgery:  ¨ Do not drive or use heavy equipment.  ¨ Do not make important decisions or sign legal documents. If important decisions or signing legal documents is necessary during the first 24 hours after surgery, have a trusted family member or spouse act on your behalf.  ¨ Avoid alcohol.  ¨ Have a responsible adult stay with you. He or she can watch for problems and help keep you safe.  Date Last Reviewed: 12/1/2016  © 2290-7139 [employer]. [address] [place] [address], [place] [address]. All rights reserved. This information is not intended as a substitute for professional medical care. Always follow your healthcare professional's instructions.

## 2020-01-22 NOTE — INTERVAL H&P NOTE
The patient has been examined and the H&P has been reviewed:    I concur with the findings and no changes have occurred since H&P was written.    Anesthesia/Surgery risks, benefits and alternative options discussed and understood by patient/family.          Active Hospital Problems    Diagnosis  POA    Atypical ductal hyperplasia of left breast [N60.92]  Yes      Resolved Hospital Problems   No resolved problems to display.

## 2020-01-23 NOTE — ANESTHESIA POSTPROCEDURE EVALUATION
Anesthesia Post Evaluation    Patient: Dawit Terrazas Murali    Procedure(s) Performed: Procedure(s) (LRB):  BIOPSY, BREAST-LEFT SEED placement 1/13/20 (Left)    Final Anesthesia Type: general    Patient location during evaluation: PACU  Patient participation: Yes- Able to Participate  Level of consciousness: awake  Post-procedure vital signs: reviewed and stable  Pain management: adequate  Airway patency: patent    PONV status at discharge: No PONV  Anesthetic complications: no      Cardiovascular status: blood pressure returned to baseline  Respiratory status: unassisted  Hydration status: euvolemic  Follow-up not needed.          Vitals Value Taken Time   /79 1/22/2020  4:01 PM   Temp 36.6 °C (97.9 °F) 1/22/2020  2:46 PM   Pulse 96 1/22/2020  4:06 PM   Resp 18 1/22/2020  4:06 PM   SpO2 98 % 1/22/2020  4:06 PM   Vitals shown include unvalidated device data.      No case tracking events are documented in the log.      Pain/Jesus Score: Jesus Score: 9 (1/22/2020  3:15 PM)

## 2020-01-23 NOTE — OP NOTE
Operative Note     1/22/2020    PRE-OP DIAGNOSIS: Atypical ductal hyperplasia of left breast [N60.92]      POST-OP DIAGNOSIS: Post-Op Diagnosis Codes:     * Atypical ductal hyperplasia of left breast [N60.92]    Procedure(s):  BIOPSY, BREAST-LEFT SEED placement 1/13/20     SURGEON: Surgeon(s) and Role:     * Raj Faith MD - Primary     * Brandy Martinez MD - Resident - Assisting    ANESTHESIA: General     OPERATIVE FINDINGS:  Excisional breast biopsy with seed localization performed for an area of atypical ductal hyperplasia in the left breast upper outer quadrant.    INDICATION FOR PROCEDURE: This patient presents with a history of Atypical ductal hyperplasia of left breast [N60.92] on core needle biopsy.    PROCEDURE IN DETAIL:  Dawit Carrion is a 64 y.o. female brought to the operating room for definitive surgery.  The patient was informed of the possible risks and complications of the procedure, including but not limited to anesthetic risks, bleeding, infection, and need for additional surgery.  The patient concurred with the proposed plan, and has given informed consent.  The site of surgery was properly noted/marked in the preoperative holding area.  The patient was brought to the operating room where she was placed in a supine position.  Under general anesthesia of the left breast was prepped and draped in sterile fashion.  The seed localization films were reviewed with the patient in the preoperative holding area to identify the area of interest in the periphery of the left upper outer quadrant posteriorly based.  A 3-4 cm incision was marked over the area of interest after identifying the area of activity with the Mag seed handheld probe.  The skin incision was made sharply and we circumferentially dissected around the area of interest intermittently using the probe to keep the seed activity in the center of the dissected specimen.  We dissected superiorly medially laterally and  inferiorly all way down to and including the pectoralis fascia with cautery.  The specimen was removed and oriented with a short stitch superiorly and a long stitch laterally.  Specimen radiograph confirmed the biopsy marker and magnetic seed both within the specimen indicating adequate gross excision for excisional biopsy.  The excisional biopsy cavity was irrigated and made hemostatic throughout with electrocautery. Once hemostasis was achieved the deep dermal and subcutaneous layers were closed with Vicryl sutures and the skin closed with a running 4 Monocryl subcuticular skin closure; Dermabond was applied. The wound was covered with sterile fluff gauze and a postprocedure bra.  Estimated blood loss was minimal, all needle instrument sponge counts were correct, and the patient was turned over anesthesia for transfer to the recovery area in a satisfactory condition. The specimen was sent to pathology for permanent sectioning.    ESTIMATED BLOOD LOSS: Minimal    COMPLICATIONS: none    DISPOSITION: PACU - hemodynamically stable.    ATTESTATION:   I was present and scrubbed for the entire procedure.

## 2020-01-29 LAB
FINAL PATHOLOGIC DIAGNOSIS: NORMAL
GROSS: NORMAL

## 2020-01-31 ENCOUNTER — TELEPHONE (OUTPATIENT)
Dept: SURGERY | Facility: CLINIC | Age: 65
End: 2020-01-31

## 2020-01-31 DIAGNOSIS — Z29.89 CHEMOPREVENTION: Primary | ICD-10-CM

## 2020-01-31 NOTE — TELEPHONE ENCOUNTER
Contacted patient to schedule her for med/onc appointment. Patient scheduled on 2/6/2020 at 0830 with Dr. Stoner at [facility]. Location, date, and time reviewed. Patient verbalized understanding.     ----- Message from Raj Faith MD sent at 1/30/2020  4:41 PM CST -----  Marlene, The patient was called with her pathology results which revealed a 1 mm residual focus of ADH.  She sees us next week for her post-op.  Could you please make her an appointment with Medical Oncology for consultation to discuss chemoprevention for consultation.  Thanks, C

## 2020-02-06 ENCOUNTER — OFFICE VISIT (OUTPATIENT)
Dept: SURGERY | Facility: CLINIC | Age: 65
End: 2020-02-06
Payer: COMMERCIAL

## 2020-02-06 VITALS
WEIGHT: 143.06 LBS | BODY MASS INDEX: 23.83 KG/M2 | HEIGHT: 65 IN | SYSTOLIC BLOOD PRESSURE: 138 MMHG | TEMPERATURE: 98 F | DIASTOLIC BLOOD PRESSURE: 66 MMHG | HEART RATE: 82 BPM

## 2020-02-06 VITALS
WEIGHT: 143.06 LBS | BODY MASS INDEX: 23.83 KG/M2 | RESPIRATION RATE: 18 BRPM | HEIGHT: 65 IN | HEART RATE: 82 BPM | SYSTOLIC BLOOD PRESSURE: 138 MMHG | TEMPERATURE: 98 F | DIASTOLIC BLOOD PRESSURE: 66 MMHG

## 2020-02-06 DIAGNOSIS — N60.92 ATYPICAL DUCTAL HYPERPLASIA OF LEFT BREAST: Primary | ICD-10-CM

## 2020-02-06 DIAGNOSIS — Z12.31 ENCOUNTER FOR SCREENING MAMMOGRAM FOR HIGH-RISK PATIENT: ICD-10-CM

## 2020-02-06 PROCEDURE — 3008F PR BODY MASS INDEX (BMI) DOCUMENTED: ICD-10-PCS | Mod: CPTII,S$GLB,, | Performed by: INTERNAL MEDICINE

## 2020-02-06 PROCEDURE — 99999 PR PBB SHADOW E&M-EST. PATIENT-LVL IV: CPT | Mod: PBBFAC,,, | Performed by: INTERNAL MEDICINE

## 2020-02-06 PROCEDURE — 99204 OFFICE O/P NEW MOD 45 MIN: CPT | Mod: S$GLB,,, | Performed by: INTERNAL MEDICINE

## 2020-02-06 PROCEDURE — 99024 POSTOP FOLLOW-UP VISIT: CPT | Mod: S$GLB,,, | Performed by: SURGERY

## 2020-02-06 PROCEDURE — 1101F PT FALLS ASSESS-DOCD LE1/YR: CPT | Mod: CPTII,S$GLB,, | Performed by: INTERNAL MEDICINE

## 2020-02-06 PROCEDURE — 99999 PR PBB SHADOW E&M-EST. PATIENT-LVL III: ICD-10-PCS | Mod: PBBFAC,,, | Performed by: SURGERY

## 2020-02-06 PROCEDURE — 99999 PR PBB SHADOW E&M-EST. PATIENT-LVL III: CPT | Mod: PBBFAC,,, | Performed by: SURGERY

## 2020-02-06 PROCEDURE — 99999 PR PBB SHADOW E&M-EST. PATIENT-LVL IV: ICD-10-PCS | Mod: PBBFAC,,, | Performed by: INTERNAL MEDICINE

## 2020-02-06 PROCEDURE — 1101F PR PT FALLS ASSESS DOC 0-1 FALLS W/OUT INJ PAST YR: ICD-10-PCS | Mod: CPTII,S$GLB,, | Performed by: INTERNAL MEDICINE

## 2020-02-06 PROCEDURE — 3008F BODY MASS INDEX DOCD: CPT | Mod: CPTII,S$GLB,, | Performed by: INTERNAL MEDICINE

## 2020-02-06 PROCEDURE — 99204 PR OFFICE/OUTPT VISIT, NEW, LEVL IV, 45-59 MIN: ICD-10-PCS | Mod: S$GLB,,, | Performed by: INTERNAL MEDICINE

## 2020-02-06 PROCEDURE — 99024 PR POST-OP FOLLOW-UP VISIT: ICD-10-PCS | Mod: S$GLB,,, | Performed by: SURGERY

## 2020-02-06 NOTE — PROGRESS NOTES
Subjective:       Patient ID: Dawit Carrion is a 64 y.o. female.    Chief Complaint: No chief complaint on file.    HPI   64-year-old female referred by Dr. Faith for recent diagnosis of atypical ductal hyperplasia.      Current history: She underwent screening mammogram on November 15, 2019 which showed some calcifications in the upper outer portion of the left breast.      Follow-up diagnostic mammogram on November 22, 2019 showed amorphous calcifications the upper outer quadrant left breast at 1:00 a.m..    On December 10, 2019 a needle biopsy was performed which showed atypical ductal hyperplasia with calcifications.      Follow-up excisional biopsy was performed on January 22, 2020 which again showed atypical ductal hyperplasia         She reports significant menopausal symptoms and the use of supplemental estrogen and progesterone since her hysterectomy.      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    HENT:        Occasional sinus issues   Respiratory: Positive for shortness of breath (mild and chronic). Negative for cough.    Cardiovascular: Negative.    Gastrointestinal: Negative.         Overdue for C-scope   Genitourinary: Negative.    Musculoskeletal: Positive for neck pain.   Skin: Negative.    Neurological: Negative.         Occ HA   Psychiatric/Behavioral: Negative.        Objective:      Physical Exam   Constitutional: She is oriented to person, place, and time. She appears well-developed and well-nourished. No distress.   HENT:    Mouth/Throat: No oropharyngeal exudate.   Eyes: No scleral icterus.   Cardiovascular: Normal rate and regular rhythm.   Pulmonary/Chest: Effort normal and breath sounds normal. She has no wheezes. She has no rales. Right breast exhibits no mass, no nipple discharge and no skin change. Left breast exhibits no mass, no nipple discharge and no skin change.       Abdominal: Soft. She exhibits no mass. There is no tenderness.   Lymphadenopathy:     She has no cervical adenopathy.   Neurological: She is alert and oriented to person, place, and time.   Psychiatric: She has a normal mood and affect. Her behavior is normal. Thought content normal.   Vitals reviewed.      Assessment:       1. Atypical ductal hyperplasia of left breast        Plan:        I discussed the role of chemoprevention the setting of atypical ductal hyperplasia.  Based on breast cancer risk assessment model her 5 year risk of breast cancer is 4.2% compared to 2% for those with average risk.  Her 10 year risk is 15.2%.  Risk reduction with an aromatase inhibitor or tamoxifen would be approximately 50%.       Due to her menopausal struggles, she may be a better candidate for SERM therapy.  She will see how she fares without estrogen supplementation and I will see back in 2 months.

## 2020-02-06 NOTE — LETTER
February 6, 2020      Raj Faith MD  [address]  [address]  [facility]  [facility] [address]           [facility]  [address], [address]  [facility] [address]  Phone: [phone]  Fax: [phone]          Patient: Dawit Carrion   MR Number: [id number]   YOB: 1955   Date of Visit: 2/6/2020       Dear Dr. Raj Faith:    Thank you for referring Dawit Carrion to me for evaluation. Attached you will find relevant portions of my assessment and plan of care.    If you have questions, please do not hesitate to call me. I look forward to following Dawit Carrion along with you.    Sincerely,    Colton Stoner MD    Enclosure  CC:  No Recipients    If you would like to receive this communication electronically, please contact [email or web address] or [phone] to request more information on [facility] access.    For providers and/or their staff who would like to refer a patient to [facility], please contact us through our one-stop-shop provider referral line, [facility], at [phone].    If you feel you have received this communication in error or would no longer like to receive these types of communications, please e-mail [email or web address]

## 2020-02-06 NOTE — PROGRESS NOTES
History and Physical  [facility]  Department of Surgery    REFERRING PROVIDER: No referring provider defined for this encounter.    CHIEF COMPLAINT: atypia of the left breast    Subjective:   PINKY Carrion is a 64 y.o. postmenopausal female referred for evaluation of atypia of the left breast noted on core needle biopsy.  Patient went for routine screening mammogram on 11/15/19.  Diagnostics mammogram performed on 19 showed an area of calcifications in the left breast at 1 o'clock, posterior depth measuring 5mm in size. A stereotactic biopsy was performed on 12/10/19.  Pathology demonstrated atypical ductal hyperplasia.    Patient does not routinely do self breast exams.  Patient has not noted a change on breast exam.  Patient denies nipple discharge. Patient denies to previous breast biopsy. Patient denies a personal history of breast cancer.    Interval History: She returns for her post-op visit s/p excisional biopsy which only demonstrated a minute focus of ADH. She is otherwise doing well. No fevers, chills, or problems with incision post-operatively.     GYN History:  Age of menarche was 11. Age of menopause was 48 after TAHBSO. Patient admits to hormonal therapy (has been on progesterone for past 5 years). Patient is . Age of first live birth was 25. Patient did breast feed.      FAMILY History:  Maternal cousin - breast cancer  Sister - thyroid cancer    Past Medical History:   Diagnosis Date    Cataract     Glaucoma     Hyperlipidemia      Past Surgical History:   Procedure Laterality Date    BREAST BIOPSY Left 2020    Procedure: BIOPSY, BREAST-LEFT SEED placement 20;  Surgeon: Raj Faith MD;  Location: [facility] OR [address] [place] [address];  Service: General;  Laterality: Left;    CHOLECYSTECTOMY      HYSTERECTOMY      TUBAL LIGATION       Current Outpatient Medications on File Prior to Visit   Medication Sig Dispense Refill    amoxicillin (AMOXIL) 500 MG  capsule Take 500 mg by mouth once daily.      b complex vitamins capsule       buPROPion (WELLBUTRIN XL) 300 MG 24 hr tablet Take 300 mg by mouth once daily.        cholecalciferol, vitamin D3, (VITAMIN D3 ORAL) Take 1,000 mg by mouth once daily.      ESTRADIOL TD Place onto the skin once daily.      latanoprost 0.005 % ophthalmic solution Place 1 drop into both eyes every evening. 2.5 mL 12    MULTIVITAMIN ORAL Take by mouth once daily.      progesterone (PROMETRIUM) 100 MG capsule Take 100 mg by mouth every evening.       testosterone cypionate (DEPO-TESTOSTERONE) 100 mg/mL injection       testosterone cypionate (DEPOTESTOTERONE CYPIONATE) 100 mg/mL injection Inject into the muscle every 14 (fourteen) days.      testosterone cypionate (DEPOTESTOTERONE CYPIONATE) 200 mg/mL injection Inject into the muscle every 14 (fourteen) days.      traMADol (ULTRAM) 50 mg tablet Take 1 tablet (50 mg total) by mouth every 6 (six) hours as needed for Pain. (Patient not taking: Reported on 2/6/2020) 20 tablet 0     Current Facility-Administered Medications on File Prior to Visit   Medication Dose Route Frequency Provider Last Rate Last Dose    0.9%  NaCl infusion   Intravenous Continuous Brandy Martinez MD   Stopped at 01/22/20 1412    lidocaine (PF) 10 mg/ml (1%) injection 10 mg  1 mL Intradermal Once Brandy Martinez MD        ondansetron injection 4 mg  4 mg Intravenous Q12H PRN Brandy Martinez MD        promethazine (PHENERGAN) 6.25 mg in dextrose 5 % 50 mL IVPB  6.25 mg Intravenous Q6H PRN Brandy Martinez MD         Social History     Socioeconomic History    Marital status:      Spouse name: Not on file    Number of children: Not on file    Years of education: Not on file    Highest education level: Not on file   Occupational History    Not on file   Social Needs    Financial resource strain: Not hard at all    Food insecurity:     Worry: Never true     Inability: Never true     Transportation needs:     Medical: No     Non-medical: No   Tobacco Use    Smoking status: Former Smoker    Tobacco comment: quit 27 years ago   Substance and Sexual Activity    Alcohol use: No     Frequency: Never     Binge frequency: Never    Drug use: No    Sexual activity: Not on file   Lifestyle    Physical activity:     Days per week: 3 days     Minutes per session: 30 min    Stress: To some extent   Relationships    Social connections:     Talks on phone: More than three times a week     Gets together: More than three times a week     Attends [employer] service: Not on file     Active member of club or organization: No     Attends meetings of clubs or organizations: Never     Relationship status:    Other Topics Concern    Not on file   Social History Narrative    Not on file     Family History   Problem Relation Age of Onset    Cancer Mother     Heart disease Father     Cancer Sister     Glaucoma Brother        Review of Systems  A comprehensive review of systems was negative.       Objective:        There were no vitals taken for this visit.    General Appearance:    Alert, cooperative, no distress, appears stated age   Head:    Normocephalic, without obvious abnormality, atraumatic   Eyes:    PERRL, lids normal   Neck:   Supple, symmetrical, no adenopathy   Lungs:     respirations unlabored; no obvious deformity   Chest Wall:    No tenderness or deformity   Heart::   Regular rate and rhythm   Abdomen:     Soft, non-tender, nondistended   Extremities:   Extremities normal, atraumatic   Skin:   Skin color, texture, turgor normal, no rashes or lesions   Lymph nodes:   No Cervical or supraclavicular adenopathy   Neuro/Psych:   Alert and oriented, good judgement   BREAST exam:  Left: Surgical incision clean, dry, intact   Right: no masses, skin changes. No nipple di; no charge or inverted nipple.  No axillary LAD    Pathology:  LEFT BREAST, EXCISIONAL BIOPSY:  Minute residual focus of  atypical ductal hyperplasia (ADH), less than 1 mm.  Background previous biopsy site changes are present including scar formation, foreign body giant cell  reaction and hemosiderin deposition.  Surgical margins are uninvolved by any lesion.  Negative for DCIS or invasive carcinoma.    Assessment:      Dawit Carrion is a 64 y.o. postmenopausal female with atypia of the left breast now s/p excisional breast biopsy 1/22/2020     Plan:   We discussed the options for management of atypia. We discussed with atypia, there is an increase in the risk of breast cancer.  We discussed that there a options for reducing that risk with chemoprevention using Tamoxifen or aromatase inhibitor therapy.    We also discussed active surveillance and screening in the future in an at high risk for breast cancer setting with use of annual screening breast MRI -she will do this in May 2020 and continue with her bilateral MMGs in November. She has been seen by medical oncology this morning to discuss chemoprevention and will consider doing so in the future. She will discontinue her progesterone and estradiol supplementation and continue with her testosterone. She will follow up with them in a few months.   Follow up with us PRN.     Nuha Martinez MD  PGY-3 General Surgery   [phone]    I have personally taken the history and examined this patient and agree with the resident's note as stated above.  The patient is doing well status post a left breast excisional breast biopsy on 01/22/2020 for a focal area of atypical ductal hyperplasia.  The excisional breast biopsy site in the upper outer left breast is healing appropriately without signs of infection.  She has excellent symmetry and offers no subjective complaints.  Patient was seen today also by Dr. Colton Stoner for discussion of chemoprevention and the patient will declined at the present time and will follow up with him in a few months to discuss further.  For now she will  discontinue her supplemental estrogen and progesterone and maintain her testosterone.  One could consider the SERM Evista in the future for a chemoprevention option as well.  With the focal area of atypia her calculated lifetime risk of breast cancer is greater than 20% so she will have high risk screening performed.  We will order a breast screening MRI for May of 2020 and resume annual digital screening mammograms with tomosynthesis in November of 2020.  She will f/u with me prn.

## 2020-03-02 ENCOUNTER — PATIENT MESSAGE (OUTPATIENT)
Dept: INTERNAL MEDICINE | Facility: CLINIC | Age: 65
End: 2020-03-02

## 2020-03-03 DIAGNOSIS — E55.9 VITAMIN D DEFICIENCY: ICD-10-CM

## 2020-03-03 DIAGNOSIS — Z00.00 GENERAL MEDICAL EXAM: Primary | ICD-10-CM

## 2020-05-18 ENCOUNTER — PATIENT MESSAGE (OUTPATIENT)
Dept: SURGERY | Facility: CLINIC | Age: 65
End: 2020-05-18

## 2020-05-18 ENCOUNTER — HOSPITAL ENCOUNTER (OUTPATIENT)
Dept: RADIOLOGY | Facility: HOSPITAL | Age: 65
Discharge: HOME | End: 2020-05-18
Attending: SURGERY
Payer: COMMERCIAL

## 2020-05-18 DIAGNOSIS — N60.92 ATYPICAL DUCTAL HYPERPLASIA OF LEFT BREAST: ICD-10-CM

## 2020-05-18 DIAGNOSIS — Z12.31 ENCOUNTER FOR SCREENING MAMMOGRAM FOR HIGH-RISK PATIENT: Primary | ICD-10-CM

## 2020-05-18 LAB
CREAT SERPL-MCNC: 0.8 MG/DL (ref 0.5–1.4)
SAMPLE: NORMAL

## 2020-05-18 PROCEDURE — 25500020 PHARM REV CODE 255: Performed by: SURGERY

## 2020-05-18 PROCEDURE — 77049 MRI BREAST C-+ W/CAD BI: CPT | Mod: 26,,, | Performed by: RADIOLOGY

## 2020-05-18 PROCEDURE — 77049 MRI BREAST W/WO CONTRAST, W/CAD, BILATERAL: ICD-10-PCS | Mod: 26,,, | Performed by: RADIOLOGY

## 2020-05-18 PROCEDURE — A9577 INJ MULTIHANCE: HCPCS | Performed by: SURGERY

## 2020-05-18 PROCEDURE — 77049 MRI BREAST C-+ W/CAD BI: CPT | Mod: TC

## 2020-05-18 RX ADMIN — GADOBENATE DIMEGLUMINE 14 ML: 529 INJECTION, SOLUTION INTRAVENOUS at 10:05

## 2020-10-12 ENCOUNTER — TELEPHONE (OUTPATIENT)
Dept: SURGERY | Facility: CLINIC | Age: 65
End: 2020-10-12

## 2020-10-12 NOTE — TELEPHONE ENCOUNTER
Returned patient call regarding the message below.  The patient is scheduled to be seen on Monday 11/30/2020, 12:30 pm mammogram and 1 pm high risk breast clinic appointment with Ludivina Fortune NP in the [facility] breast clinic.  Patient voiced understanding of appointment date, time, and location.  Reminder letter mailed to the patient.         ----- Message from Jh Powell sent at 10/12/2020  9:57 AM CDT -----  Patient called to speak w/ someone regarding a notice she received regarding scheduling w/ a alternate provider due to  leaving the organization, requesting callback [phone]

## 2020-11-30 ENCOUNTER — OFFICE VISIT (OUTPATIENT)
Dept: HEMATOLOGY/ONCOLOGY | Facility: CLINIC | Age: 65
End: 2020-11-30
Payer: COMMERCIAL

## 2020-11-30 ENCOUNTER — HOSPITAL ENCOUNTER (OUTPATIENT)
Dept: RADIOLOGY | Facility: HOSPITAL | Age: 65
Discharge: HOME | End: 2020-11-30
Attending: SURGERY
Payer: COMMERCIAL

## 2020-11-30 VITALS
SYSTOLIC BLOOD PRESSURE: 141 MMHG | HEART RATE: 72 BPM | DIASTOLIC BLOOD PRESSURE: 65 MMHG | BODY MASS INDEX: 24.75 KG/M2 | HEIGHT: 65 IN | TEMPERATURE: 98 F | OXYGEN SATURATION: 97 % | RESPIRATION RATE: 18 BRPM | WEIGHT: 148.56 LBS

## 2020-11-30 DIAGNOSIS — Z12.31 ENCOUNTER FOR SCREENING MAMMOGRAM FOR HIGH-RISK PATIENT: ICD-10-CM

## 2020-11-30 DIAGNOSIS — Z91.89 AT HIGH RISK FOR BREAST CANCER: ICD-10-CM

## 2020-11-30 DIAGNOSIS — N60.92 ATYPICAL DUCTAL HYPERPLASIA OF LEFT BREAST: Primary | ICD-10-CM

## 2020-11-30 DIAGNOSIS — N60.92 ATYPICAL DUCTAL HYPERPLASIA OF LEFT BREAST: ICD-10-CM

## 2020-11-30 PROCEDURE — 99999 PR PBB SHADOW E&M-EST. PATIENT-LVL IV: ICD-10-PCS | Mod: PBBFAC,,, | Performed by: NURSE PRACTITIONER

## 2020-11-30 PROCEDURE — 3008F BODY MASS INDEX DOCD: CPT | Mod: CPTII,S$GLB,, | Performed by: NURSE PRACTITIONER

## 2020-11-30 PROCEDURE — 77063 MAMMO DIGITAL SCREENING BILAT WITH TOMOSYNTHESIS_CAD: ICD-10-PCS | Mod: 26,,, | Performed by: RADIOLOGY

## 2020-11-30 PROCEDURE — 99999 PR PBB SHADOW E&M-EST. PATIENT-LVL IV: CPT | Mod: PBBFAC,,, | Performed by: NURSE PRACTITIONER

## 2020-11-30 PROCEDURE — 1101F PT FALLS ASSESS-DOCD LE1/YR: CPT | Mod: CPTII,S$GLB,, | Performed by: NURSE PRACTITIONER

## 2020-11-30 PROCEDURE — 77063 BREAST TOMOSYNTHESIS BI: CPT | Mod: 26,,, | Performed by: RADIOLOGY

## 2020-11-30 PROCEDURE — 77067 SCR MAMMO BI INCL CAD: CPT | Mod: 26,,, | Performed by: RADIOLOGY

## 2020-11-30 PROCEDURE — 3008F PR BODY MASS INDEX (BMI) DOCUMENTED: ICD-10-PCS | Mod: CPTII,S$GLB,, | Performed by: NURSE PRACTITIONER

## 2020-11-30 PROCEDURE — 77067 SCR MAMMO BI INCL CAD: CPT | Mod: TC

## 2020-11-30 PROCEDURE — 1125F AMNT PAIN NOTED PAIN PRSNT: CPT | Mod: S$GLB,,, | Performed by: NURSE PRACTITIONER

## 2020-11-30 PROCEDURE — 77067 MAMMO DIGITAL SCREENING BILAT WITH TOMOSYNTHESIS_CAD: ICD-10-PCS | Mod: 26,,, | Performed by: RADIOLOGY

## 2020-11-30 PROCEDURE — 3288F PR FALLS RISK ASSESSMENT DOCUMENTED: ICD-10-PCS | Mod: CPTII,S$GLB,, | Performed by: NURSE PRACTITIONER

## 2020-11-30 PROCEDURE — 1101F PR PT FALLS ASSESS DOC 0-1 FALLS W/OUT INJ PAST YR: ICD-10-PCS | Mod: CPTII,S$GLB,, | Performed by: NURSE PRACTITIONER

## 2020-11-30 PROCEDURE — 3288F FALL RISK ASSESSMENT DOCD: CPT | Mod: CPTII,S$GLB,, | Performed by: NURSE PRACTITIONER

## 2020-11-30 PROCEDURE — 1125F PR PAIN SEVERITY QUANTIFIED, PAIN PRESENT: ICD-10-PCS | Mod: S$GLB,,, | Performed by: NURSE PRACTITIONER

## 2020-11-30 PROCEDURE — 1159F PR MEDICATION LIST DOCUMENTED IN MEDICAL RECORD: ICD-10-PCS | Mod: S$GLB,,, | Performed by: NURSE PRACTITIONER

## 2020-11-30 PROCEDURE — 1159F MED LIST DOCD IN RCRD: CPT | Mod: S$GLB,,, | Performed by: NURSE PRACTITIONER

## 2020-11-30 PROCEDURE — 99214 OFFICE O/P EST MOD 30 MIN: CPT | Mod: S$GLB,,, | Performed by: NURSE PRACTITIONER

## 2020-11-30 PROCEDURE — 99214 PR OFFICE/OUTPT VISIT, EST, LEVL IV, 30-39 MIN: ICD-10-PCS | Mod: S$GLB,,, | Performed by: NURSE PRACTITIONER

## 2020-11-30 NOTE — PROGRESS NOTES
Subjective:       Patient ID: Dawit Carrion is a 65 y.o. female.    Chief Complaint: High Risk Screening    HPI   65-year-old female here for follow up for diagnosis of atypical ductal hyperplasia.      Feels good today.   No new issues. Occasional left breast discomfort at incision site - worse after lifting weights.   No fever/chills. No infectious complaints.   No CP/SOB/cough.    Current history: per Dr. Stoner's note:  She underwent screening mammogram on November 15, 2019 which showed some calcifications in the upper outer portion of the left breast.      Follow-up diagnostic mammogram on November 22, 2019 showed amorphous calcifications the upper outer quadrant left breast at 1:00 a.m..    On December 10, 2019 a needle biopsy was performed which showed atypical ductal hyperplasia with calcifications.      Follow-up excisional biopsy was performed on January 22, 2020 which again showed atypical ductal hyperplasia     She reports significant menopausal symptoms and the use of supplemental estrogen and progesterone since her hysterectomy.    Menstrual History:    Menarche - 11   G -  4  P -4   First birth age -  25,BCP -     Menopause -  Total hysterectomy age 48     HRT - estrogen implants up to current DX    Family History -                                 Breast -  Maternal cousin                                Ovarian - no    Other - sister with thyroid cancer    Social History :    Smoking - quit 30 + years ago  ETOH - no  Work - [employer]    PMH: hyperlipidemia      Review of Systems   Constitutional:        See above    All other systems reviewed and are negative.      Objective:      Physical Exam  Vitals signs reviewed.   Constitutional:       General: She is not in acute distress.     Appearance: She is well-developed.   HENT:      Mouth/Throat:      Pharynx: No oropharyngeal exudate.   Eyes:      General: No scleral icterus.  Cardiovascular:      Rate and Rhythm: Normal  rate and regular rhythm.   Pulmonary:      Effort: Pulmonary effort is normal.      Breath sounds: Normal breath sounds. No wheezing or rales.   Chest:      Breasts:         Right: No mass, nipple discharge or skin change.         Left: No mass, nipple discharge or skin change.       Abdominal:      Palpations: Abdomen is soft. There is no mass.      Tenderness: There is no abdominal tenderness.   Lymphadenopathy:      Cervical: No cervical adenopathy.   Neurological:      Mental Status: She is alert and oriented to person, place, and time.   Psychiatric:         Behavior: Behavior normal.         Thought Content: Thought content normal.         MMG 11/30/2020:  Impression:  Bilateral  There is no mammographic evidence of malignancy.     BI-RADS Category:   Overall: 2 - Benign     Recommendation:  Routine screening mammogram in 1 year is recommended.    MRI breasts 5/18/2020:   Impression:  Bilateral  There is no MR evidence of malignancy.     BI-RADS Category:   Overall: 2 - Benign     Recommendation:  Return to annual screening mammogram schedule is recommended      Supplemental screening with breast MRI is recommended in conjunction with or alternating every 6 months with screening mammogram given patient's elevated lifetime risk score.      Assessment:       1. Atypical ductal hyperplasia of left breast    2. At high risk for breast cancer        Plan:       Doing well, ROMEL clinically.   Opts out of chemo prevention. Risk factor modifications discussed. Continue exercise and healthy diet.   RTC in 6 months with MRI breasts.   Of note, patient prefers to be seen here for her 2 CBE yearly. I will see q 6 months with MMG and MRI alternating.   Continue to follow up with PCP for other health maintenance. Reminded the need for Vit D yearly.       Patient is in agreement with the proposed treatment plan. All questions were answered to the patient's satisfaction. Pt knows to call clinic for any new or worsening  symptoms and if anything is needed before the next clinic visit.      Ludivina Fortune, SHANDAP-C  Hematology & Oncology  [facility] [place] [address]  ph. [phone]  Fax. [phone]     I spent 30 minutes (face to face) with the patient, more than 50% was in counseling and coordination of care as detailed above.

## 2020-11-30 NOTE — PROGRESS NOTES
"  History:     Reason For Consultation:   Increased lifetime risk of breast cancer    Referring Provider:   Raj Faith MD  [address] [facility]  2nd Floor  [place] [address]    Records Obtained: Records of the patients history including those obtained from the referring provider were reviewed and summarized in detail.    HPI:   Dawit Carrion presents for consultation of increased risk of breast cancer. She is {Menopause:86918}. She presented for screening mammogram which was benign 11/30/2020 but revealed a Tyrer-Cuzick score of 22.76%.     This follow up will consist of, but not limited to: reviewing her TC score, performing a CBE, and discussing various factors that determine high risk assessment. *****    High Risk Breast cancer specific history:  Age of Menarche: ***  Number of pregnancies: ***; age of first live birth: ***  Number of prior breast biopsies: ***  History of breast feeding: {YES /NO:57917}  Uterus and ovaries intact: {yes no:600654::"Yes"}  Age of Menopause: ***  Supplemental hormone therapy: {YES /NO:81176}  Personal history of cancer: {YES /NO:44819}  Previous chest radiation exposure between ages 10-30 years old: {YES /NO:38590}  Family members with breast or ovarian cancer: ***  Genetic testing: {YES /NO:35247}  Ashkenazi Scientologist Inheritance: {YES /NO:75764}    Tyrer-Cuzick questionnaire, version 8.0b (estimated lifetime risk of breast cancer):******* (fix)  - Height:  ***  - Weight:  ***  - Breast density per BI-RADS:    - Age at menarche:  ***  - Age at first live birth:  ***  - Age at menopause, if applicable:  ***  - HRT usage:  ****  (NOTES) ***  ***Menopausal is 1 year with no periods. If a woman is 49 and is still having periods, then she is perimenopausal.   ***Vaginal estrogen cream or any vaginal hormone is not considered HRT   ***Progesterone should be considered HRT if it is being given to a postmenopausal woman.It is often used in " perimenopause to help regulate cycles and for birth control.   ***Having her uterus removed does not necessarily make her menopausal. In that case you would need to check Estradiol and FSH levels to be able to call her menopausal   - BRCA testing:  ****  - Personal history of ovarian cancer:  ***  - Personal history of breast biopsy:  ***  - Ashkenazi Gnosticism inheritance:  ***  - Family history:  ***  SEE CALCULATED RISK BELOW.       Past Medical   Past Medical History:   Diagnosis Date    Atypical ductal hyperplasia of left breast 01/2020    Cataract     Glaucoma     Hyperlipidemia      Patient Active Problem List   Diagnosis    SLAP (superior glenoid labrum lesion)    Knee pain    Primary open angle glaucoma (POAG) of both eyes, moderate stage    Atypical ductal hyperplasia of left breast    Encounter for screening mammogram for high-risk patient     Social History   Social History     Tobacco Use    Smoking status: Former Smoker    Tobacco comment: quit 27 years ago   Substance Use Topics    Alcohol use: No     Frequency: Never     Binge frequency: Never    Drug use: No     Family History  Family History   Problem Relation Age of Onset    Cancer Mother     Heart disease Father     Cancer Sister     Glaucoma Brother      Medications    Current Outpatient Medications:     amoxicillin (AMOXIL) 500 MG capsule, Take 500 mg by mouth once daily., Disp: , Rfl:     b complex vitamins capsule, , Disp: , Rfl:     buPROPion (WELLBUTRIN XL) 300 MG 24 hr tablet, Take 300 mg by mouth once daily.  , Disp: , Rfl:     cholecalciferol, vitamin D3, (VITAMIN D3 ORAL), Take 1,000 mg by mouth once daily., Disp: , Rfl:     ESTRADIOL TD, Place onto the skin once daily., Disp: , Rfl:     latanoprost 0.005 % ophthalmic solution, Place 1 drop into both eyes every evening., Disp: 2.5 mL, Rfl: 12    MULTIVITAMIN ORAL, Take by mouth once daily., Disp: , Rfl:     progesterone (PROMETRIUM) 100 MG capsule, Take 100 mg by  "mouth every evening. , Disp: , Rfl:     testosterone cypionate (DEPO-TESTOSTERONE) 100 mg/mL injection, , Disp: , Rfl:     testosterone cypionate (DEPOTESTOTERONE CYPIONATE) 100 mg/mL injection, Inject into the muscle every 14 (fourteen) days., Disp: , Rfl:     testosterone cypionate (DEPOTESTOTERONE CYPIONATE) 200 mg/mL injection, Inject into the muscle every 14 (fourteen) days., Disp: , Rfl:     traMADol (ULTRAM) 50 mg tablet, Take 1 tablet (50 mg total) by mouth every 6 (six) hours as needed for Pain. (Patient not taking: Reported on 2/6/2020), Disp: 20 tablet, Rfl: 0  No current facility-administered medications for this visit.     Facility-Administered Medications Ordered in Other Visits:     0.9%  NaCl infusion, , Intravenous, Continuous, Brandy Martinez MD, Stopped at 01/22/20 1412    lidocaine (PF) 10 mg/ml (1%) injection 10 mg, 1 mL, Intradermal, Once, Brandy Martinez MD    ondansetron injection 4 mg, 4 mg, Intravenous, Q12H PRN, Brandy Martinez MD    promethazine (PHENERGAN) 6.25 mg in dextrose 5 % 50 mL IVPB, 6.25 mg, Intravenous, Q6H PRN, Brandy Martinez MD  Allergies  Review of patient's allergies indicates:   Allergen Reactions    Codeine Shortness Of Breath    Flagyl [metronidazole hcl] Itching    Sulfa (sulfonamide antibiotics) Other (See Comments)     Review of Systems  Review of Systems    Objective:      Vitals:   Vitals:    11/30/20 1302   Resp: 18   Weight: 67.4 kg (148 lb 9.4 oz)   Height: 5' 5" (1.651 m)     BMI: Body mass index is 24.73 kg/m².   Body surface area is 1.76 meters squared.    Physical Exam:   Physical Exam  Breast Exam: {madalynjbreastexam:60253}      Laboratory Data: reviewed most recent   Results for orders placed or performed during the hospital encounter of 05/18/20   ISTAT CREATININE   Result Value Ref Range    POC Creatinine 0.8 0.5 - 1.4 mg/dL    Sample VENOUS           Imaging: reviewed most recent  Mammo Digital Screening Bilat w/ Garth  Narrative: Result: "   Mammo Digital Screening Bilat w/ Garth     History:  Patient is 65 y.o. and is seen for a screening mammogram.    Films Compared:  Prior images (if available) were compared.     Findings:  This procedure was performed using tomosynthesis. Computer-aided detection   was utilized in the interpretation of this examination.  The breasts have scattered areas of fibroglandular density.     Left  There are post-surgical findings from a previous excisional biopsy seen in   the upper outer quadrant of the left breast in the posterior depth. There   has been no interval development of a suspicious mass, microcalcification,   or architectural distortion.     There is no evidence of suspicious masses, calcifications, or other   abnormal findings in the left breast.    Right  There is no evidence of suspicious masses, calcifications, or other   abnormal findings in the right breast.  Impression: Bilateral  There is no mammographic evidence of malignancy.    BI-RADS Category:   Overall: 2 - Benign     Recommendation:  Routine screening mammogram in 1 year is recommended.    Your estimated lifetime risk of breast cancer (to age 85) based on   Tyrer-Cuzick risk assessment model is Tyrer-Cuzick: 22.76 %. According to   the American Cancer Society, patients with a lifetime breast cancer risk   of 20% or higher might benefit from supplemental screening tests.         Assessment:   No diagnosis found.    1. Increased risk of breast cancer   * Tyrer-Cuzick (TC) lifetime risk of ***%. We discussed that TC score will categorize your lifetime risk of being diagnosed with breast cancer. Categories are as such: Average risk <15%, Intermediate risk 15-19%, and High risk > or = to 20%. Risk factors are categorized into 2 groups: Modifiable and Non-modifiable. Modifiable risk factors include use of hormones, alcohol, smoking, diet and exercise. Non-modifiable risk factors include breast density, genetics, chest radiation, previous pregnancies,  age of first period, and age of menopause.    **For women at high risk for breast cancer, endocrine therapy can reduce the risk of invasive and/or in situ breast cancers.   * Discussed that Tamoxifen 20 mg daily for 5 years has shown to reduce risk of breast cancer by 49% and women with ADH/ALH or LCIS have an even more significant reduction of risk of 86%. Aromatase inhibitors for 5 years have also shown risk reduction in terms of 50-60%. At current, there is not adequate data to recommend longer courses of therapy more than 5 years for risk reduction.    * *** Tamoxifen has limited data in BRCA 1/2 mutation carriers but limited retrospective data is suggestive of benefit. There is retrospective data that aromatase inhibitors can reduce the risk of contralateral ER positive breast cancers in BRCA 1/2 patients who were taking AIs as adjuvant therapy. There is no data for raloxifen in the population.    * *** Reviewed risks of Tamoxifen side effects include hot flashes, invasive endometrial cancer in women > 49 years of age (2.3/1000 compared to 0.9/1000), cataracts, increased risk of pulmonary embolism among others. A handout was given to her.   * Reviewed Lifestyle modifications which have shown benefit:  · Limit alcohol consumption to less than 1 drink per day (1 ounce liquor, 6 oz wine, 8 oz beer)  · Avoid smoking.  · Exercise at least 150 minutes per week of moderate intensity aerobic activity or at least 75 minutes of vigorous activity.   · Maintain healthy weight and avoid post-menopausal weight gain. Avoid processed foods and eat more lean proteins, fruits and vegetables.   * Reviewed future screening:   · Semiannual CBE (every 6 months).   · Annual Breast MRI alternating with an annual MMG. We discussed that MRI's are expensive and she can contact her insurance company with questions regarding coverage. We also discussed that MRI's may have false positives and gadolinium is used but no data to date to  "suggest harm. She can not undergo an MRI if pregnant.   · The MRI uses a gadolinium based contrast. It is NOT the iodine contrast used with CT scans that causes many to react.    ·   ·   · (Fix)*******Breast MRI for the detection of breast cancer requires administration of the contrast agent gadolinium. The use of MRI for breast cancer detection is based on the concept of tumor angiogenesis or neovascularity. Tumor-associated blood vessels have increased permeability, which leads to prompt uptake and release of gadolinium within the first one to two minutes after administration, leading to a pattern of rapid enhancement and washout on MRI.   · ?Bilateral breast examination - Both breasts should be evaluated in an MRI study, for comparison purposes, even when concern about possible pathology involves only one breast.  · ?Contrast - Intravenous gadolinium contrast must be used to maximize cancer detection and is administered before breast MRI to highlight the neovascularity associated with cancers. Contrast is not necessary when the study is performed to evaluate silicone implant integrity.  · Allergic and anaphylactoid reactions to gadolinium are rare, but can occur. In addition, in patients with renal failure, gadolinium can cause contrast nephropathy and/or nephrogenic systemic fibrosis. (See "Patient evaluation before gadolinium contrast administration for magnetic resonance imaging".)  · A few studies have also reported gadolinium deposition in the brain from repeated intravenous administration, with the degree of deposition varying based on the specific contrast agent. The clinical significance of this deposition remains unknown, and no data for humans exist to show any adverse effects at this time [4,5].********                  Plan:     1. Patient has opted *** chemoprevention. ***After a thorough discussion, patient elects to proceed with {options for increased risk of breast cancer:69040}.  2. Screening " recommended with {Breast screenin}.   3. Encouraged breast awareness, including monthly breast self-exams.   4. Lifestyle modifications as detailed above.   5.   *****Patient will follow up with PCP or GYN for one semiannual CBE along with annual mammogram and will RTC here for one semiannual CBE along with annual breast MRI.  6.   ***Refer to Kalia Arana NP  for Genetic counseling.   7.   ***Refer to nutritionist or bariatrician.   8.   ***Refer to Dr. Booth for a well woman to discuss hormonal **** or contraceptive therapy****  9.   Advised patient to RTC with any interval changes or concerns.        Questions were encouraged and answered to patient's satisfaction, and patient verbalized understanding of information and agreement with the plan.      ***Patient declines {options for increased risk of breast cancer:33656} at this time and was advised to contact clinic with any changes in her decision.      Discussed with patient that there are several models available for stratifying breast cancer risk, and Tyrer-Cuzick is presently the model utilized by [facility] and is a model recommended per current NCCN guidelines.    We discussed that there are limitations to every model for risk assessment, particularly that TC can overestimate risk in women with atypical hyperplasia and dense breasts and that Leanna underestimates risk for those with a strong family history of breast or ovarian cancers as well as non-white women with atypical hyperplasia which can make them appear to not be candidates for risk reducing therapies.     Follow-up in {Numbers; 1-12 months:74467}. I asked the patient to call for any questions, concerns, or new symptoms.     I spent *** minutes with patient and family with *** spent face to face counseling on diagnosis, goals of therapy, risk reduction options     *** OTHER THOUGHTS***  Risk reduction therapy in those < 35 years of age is unknown.     Raloxifene only for  post-menopausal women > 36 yo. Unlikely as effective as Tamoxifen, but good option if uterus still in place or osteoporosis. Can consider AI if Tamoxifen contraindicated.     Should try to avoid tamoxifen in PTEN patients due to PTEN carrying endometrial cancer risk.

## 2020-11-30 NOTE — LETTER
November 30, 2020      Raj Faith MD  [address]  2nd Floor  [facility]Specialty Clinic  [facility] [address]           [facility]  [address]  [facility] [address]  Phone: [phone]  Fax: [phone]          Patient: Dawit Carrion   MR Number: [id number]   YOB: 1955   Date of Visit: 11/30/2020       Dear Dr. Raj Faith:    Thank you for referring Dawit Carrion to me for evaluation. Attached you will find relevant portions of my assessment and plan of care.    If you have questions, please do not hesitate to call me. I look forward to following Dawit Carrion along with you.    Sincerely,    Ludivina Fortune, NP    Enclosure  CC:  No Recipients    If you would like to receive this communication electronically, please contact [email or web address] or [phone] to request more information on [employer] access.    For providers and/or their staff who would like to refer a patient to [facility], please contact us through our one-stop-shop provider referral line, [facility], at [phone].    If you feel you have received this communication in error or would no longer like to receive these types of communications, please e-mail [email or web address]

## 2021-05-26 ENCOUNTER — HOSPITAL ENCOUNTER (OUTPATIENT)
Dept: RADIOLOGY | Facility: HOSPITAL | Age: 66
Discharge: HOME | End: 2021-05-26
Attending: NURSE PRACTITIONER
Payer: COMMERCIAL

## 2021-05-26 ENCOUNTER — OFFICE VISIT (OUTPATIENT)
Dept: HEMATOLOGY/ONCOLOGY | Facility: CLINIC | Age: 66
End: 2021-05-26
Payer: COMMERCIAL

## 2021-05-26 VITALS
HEART RATE: 82 BPM | SYSTOLIC BLOOD PRESSURE: 121 MMHG | DIASTOLIC BLOOD PRESSURE: 66 MMHG | OXYGEN SATURATION: 95 % | WEIGHT: 148.13 LBS | HEIGHT: 65 IN | RESPIRATION RATE: 18 BRPM | BODY MASS INDEX: 24.68 KG/M2

## 2021-05-26 DIAGNOSIS — N60.92 ATYPICAL DUCTAL HYPERPLASIA OF LEFT BREAST: Primary | ICD-10-CM

## 2021-05-26 DIAGNOSIS — Z91.89 AT HIGH RISK FOR BREAST CANCER: ICD-10-CM

## 2021-05-26 DIAGNOSIS — Z80.3 FAMILY HISTORY OF BREAST CANCER: ICD-10-CM

## 2021-05-26 DIAGNOSIS — N60.92 ATYPICAL DUCTAL HYPERPLASIA OF LEFT BREAST: ICD-10-CM

## 2021-05-26 PROCEDURE — 99999 PR PBB SHADOW E&M-EST. PATIENT-LVL IV: CPT | Mod: PBBFAC,,, | Performed by: NURSE PRACTITIONER

## 2021-05-26 PROCEDURE — 3008F PR BODY MASS INDEX (BMI) DOCUMENTED: ICD-10-PCS | Mod: CPTII,S$GLB,, | Performed by: NURSE PRACTITIONER

## 2021-05-26 PROCEDURE — 99214 PR OFFICE/OUTPT VISIT, EST, LEVL IV, 30-39 MIN: ICD-10-PCS | Mod: S$GLB,,, | Performed by: NURSE PRACTITIONER

## 2021-05-26 PROCEDURE — 25500020 PHARM REV CODE 255: Performed by: NURSE PRACTITIONER

## 2021-05-26 PROCEDURE — 1126F AMNT PAIN NOTED NONE PRSNT: CPT | Mod: S$GLB,,, | Performed by: NURSE PRACTITIONER

## 2021-05-26 PROCEDURE — 3008F BODY MASS INDEX DOCD: CPT | Mod: CPTII,S$GLB,, | Performed by: NURSE PRACTITIONER

## 2021-05-26 PROCEDURE — 77049 MRI BREAST C-+ W/CAD BI: CPT | Mod: TC

## 2021-05-26 PROCEDURE — 77049 MRI BREAST W/WO CONTRAST, W/CAD, BILATERAL: ICD-10-PCS | Mod: 26,,, | Performed by: RADIOLOGY

## 2021-05-26 PROCEDURE — 1159F MED LIST DOCD IN RCRD: CPT | Mod: S$GLB,,, | Performed by: NURSE PRACTITIONER

## 2021-05-26 PROCEDURE — A9577 INJ MULTIHANCE: HCPCS | Performed by: NURSE PRACTITIONER

## 2021-05-26 PROCEDURE — 1126F PR PAIN SEVERITY QUANTIFIED, NO PAIN PRESENT: ICD-10-PCS | Mod: S$GLB,,, | Performed by: NURSE PRACTITIONER

## 2021-05-26 PROCEDURE — 3288F FALL RISK ASSESSMENT DOCD: CPT | Mod: CPTII,S$GLB,, | Performed by: NURSE PRACTITIONER

## 2021-05-26 PROCEDURE — 99999 PR PBB SHADOW E&M-EST. PATIENT-LVL IV: ICD-10-PCS | Mod: PBBFAC,,, | Performed by: NURSE PRACTITIONER

## 2021-05-26 PROCEDURE — 3288F PR FALLS RISK ASSESSMENT DOCUMENTED: ICD-10-PCS | Mod: CPTII,S$GLB,, | Performed by: NURSE PRACTITIONER

## 2021-05-26 PROCEDURE — 1101F PR PT FALLS ASSESS DOC 0-1 FALLS W/OUT INJ PAST YR: ICD-10-PCS | Mod: CPTII,S$GLB,, | Performed by: NURSE PRACTITIONER

## 2021-05-26 PROCEDURE — 1101F PT FALLS ASSESS-DOCD LE1/YR: CPT | Mod: CPTII,S$GLB,, | Performed by: NURSE PRACTITIONER

## 2021-05-26 PROCEDURE — 99214 OFFICE O/P EST MOD 30 MIN: CPT | Mod: S$GLB,,, | Performed by: NURSE PRACTITIONER

## 2021-05-26 PROCEDURE — 1159F PR MEDICATION LIST DOCUMENTED IN MEDICAL RECORD: ICD-10-PCS | Mod: S$GLB,,, | Performed by: NURSE PRACTITIONER

## 2021-05-26 PROCEDURE — 77049 MRI BREAST C-+ W/CAD BI: CPT | Mod: 26,,, | Performed by: RADIOLOGY

## 2021-05-26 RX ADMIN — GADOBENATE DIMEGLUMINE 15 ML: 529 INJECTION, SOLUTION INTRAVENOUS at 11:05

## 2021-12-03 ENCOUNTER — HOSPITAL ENCOUNTER (OUTPATIENT)
Dept: RADIOLOGY | Facility: HOSPITAL | Age: 66
Discharge: HOME | End: 2021-12-03
Attending: NURSE PRACTITIONER
Payer: MEDICARE

## 2021-12-03 ENCOUNTER — OFFICE VISIT (OUTPATIENT)
Dept: HEMATOLOGY/ONCOLOGY | Facility: CLINIC | Age: 66
End: 2021-12-03
Payer: MEDICARE

## 2021-12-03 VITALS
TEMPERATURE: 98 F | RESPIRATION RATE: 16 BRPM | HEART RATE: 89 BPM | DIASTOLIC BLOOD PRESSURE: 63 MMHG | OXYGEN SATURATION: 96 % | SYSTOLIC BLOOD PRESSURE: 129 MMHG | HEIGHT: 65 IN | WEIGHT: 150.81 LBS | BODY MASS INDEX: 25.13 KG/M2

## 2021-12-03 VITALS — WEIGHT: 145 LBS | HEIGHT: 65 IN | BODY MASS INDEX: 24.16 KG/M2

## 2021-12-03 DIAGNOSIS — Z80.3 FAMILY HISTORY OF BREAST CANCER: ICD-10-CM

## 2021-12-03 DIAGNOSIS — N60.92 ATYPICAL DUCTAL HYPERPLASIA OF LEFT BREAST: Primary | ICD-10-CM

## 2021-12-03 DIAGNOSIS — N60.92 ATYPICAL DUCTAL HYPERPLASIA OF LEFT BREAST: ICD-10-CM

## 2021-12-03 DIAGNOSIS — E55.9 VITAMIN D DEFICIENCY: ICD-10-CM

## 2021-12-03 DIAGNOSIS — Z91.89 AT HIGH RISK FOR BREAST CANCER: ICD-10-CM

## 2021-12-03 DIAGNOSIS — E07.9 THYROID DYSFUNCTION: ICD-10-CM

## 2021-12-03 PROCEDURE — 99214 OFFICE O/P EST MOD 30 MIN: CPT | Mod: S$GLB,,, | Performed by: NURSE PRACTITIONER

## 2021-12-03 PROCEDURE — 99214 PR OFFICE/OUTPT VISIT, EST, LEVL IV, 30-39 MIN: ICD-10-PCS | Mod: S$GLB,,, | Performed by: NURSE PRACTITIONER

## 2021-12-03 PROCEDURE — 77062 MAMMO DIGITAL DIAGNOSTIC BILAT WITH TOMO: ICD-10-PCS | Mod: 26,,, | Performed by: RADIOLOGY

## 2021-12-03 PROCEDURE — 77062 BREAST TOMOSYNTHESIS BI: CPT | Mod: 26,,, | Performed by: RADIOLOGY

## 2021-12-03 PROCEDURE — 99214 OFFICE O/P EST MOD 30 MIN: CPT | Mod: PBBFAC | Performed by: NURSE PRACTITIONER

## 2021-12-03 PROCEDURE — 77066 MAMMO DIGITAL DIAGNOSTIC BILAT WITH TOMO: ICD-10-PCS | Mod: 26,,, | Performed by: RADIOLOGY

## 2021-12-03 PROCEDURE — 77066 DX MAMMO INCL CAD BI: CPT | Mod: 26,,, | Performed by: RADIOLOGY

## 2021-12-03 PROCEDURE — 99999 PR PBB SHADOW E&M-EST. PATIENT-LVL IV: ICD-10-PCS | Mod: PBBFAC,,, | Performed by: NURSE PRACTITIONER

## 2021-12-03 PROCEDURE — 77062 BREAST TOMOSYNTHESIS BI: CPT | Mod: TC

## 2021-12-03 PROCEDURE — 99999 PR PBB SHADOW E&M-EST. PATIENT-LVL IV: CPT | Mod: PBBFAC,,, | Performed by: NURSE PRACTITIONER

## 2022-06-08 ENCOUNTER — PATIENT MESSAGE (OUTPATIENT)
Dept: HEMATOLOGY/ONCOLOGY | Facility: CLINIC | Age: 67
End: 2022-06-08
Payer: MEDICARE

## 2022-06-09 ENCOUNTER — OFFICE VISIT (OUTPATIENT)
Dept: HEMATOLOGY/ONCOLOGY | Facility: CLINIC | Age: 67
End: 2022-06-09
Payer: MEDICARE

## 2022-06-09 ENCOUNTER — HOSPITAL ENCOUNTER (OUTPATIENT)
Dept: RADIOLOGY | Facility: HOSPITAL | Age: 67
Discharge: HOME | End: 2022-06-09
Attending: NURSE PRACTITIONER
Payer: MEDICARE

## 2022-06-09 VITALS
HEIGHT: 65 IN | HEART RATE: 102 BPM | BODY MASS INDEX: 24.61 KG/M2 | DIASTOLIC BLOOD PRESSURE: 68 MMHG | WEIGHT: 147.69 LBS | RESPIRATION RATE: 16 BRPM | TEMPERATURE: 98 F | SYSTOLIC BLOOD PRESSURE: 136 MMHG | OXYGEN SATURATION: 97 %

## 2022-06-09 DIAGNOSIS — Z91.89 AT HIGH RISK FOR BREAST CANCER: ICD-10-CM

## 2022-06-09 DIAGNOSIS — N60.92 ATYPICAL DUCTAL HYPERPLASIA OF LEFT BREAST: ICD-10-CM

## 2022-06-09 DIAGNOSIS — Z80.3 FAMILY HISTORY OF BREAST CANCER: ICD-10-CM

## 2022-06-09 DIAGNOSIS — N60.92 ATYPICAL DUCTAL HYPERPLASIA OF LEFT BREAST: Primary | ICD-10-CM

## 2022-06-09 PROCEDURE — A9577 INJ MULTIHANCE: HCPCS | Performed by: NURSE PRACTITIONER

## 2022-06-09 PROCEDURE — 99214 OFFICE O/P EST MOD 30 MIN: CPT | Mod: PBBFAC | Performed by: NURSE PRACTITIONER

## 2022-06-09 PROCEDURE — 77049 MRI BREAST C-+ W/CAD BI: CPT | Mod: 26,,, | Performed by: RADIOLOGY

## 2022-06-09 PROCEDURE — 99999 PR PBB SHADOW E&M-EST. PATIENT-LVL IV: ICD-10-PCS | Mod: PBBFAC,,, | Performed by: NURSE PRACTITIONER

## 2022-06-09 PROCEDURE — 99214 OFFICE O/P EST MOD 30 MIN: CPT | Mod: S$PBB,,, | Performed by: NURSE PRACTITIONER

## 2022-06-09 PROCEDURE — 99214 PR OFFICE/OUTPT VISIT, EST, LEVL IV, 30-39 MIN: ICD-10-PCS | Mod: S$PBB,,, | Performed by: NURSE PRACTITIONER

## 2022-06-09 PROCEDURE — 25500020 PHARM REV CODE 255: Performed by: NURSE PRACTITIONER

## 2022-06-09 PROCEDURE — 99999 PR PBB SHADOW E&M-EST. PATIENT-LVL IV: CPT | Mod: PBBFAC,,, | Performed by: NURSE PRACTITIONER

## 2022-06-09 PROCEDURE — C8937 CAD BREAST MRI: HCPCS | Mod: TC

## 2022-06-09 PROCEDURE — 77049 MRI BREAST W/WO CONTRAST, W/CAD, BILATERAL: ICD-10-PCS | Mod: 26,,, | Performed by: RADIOLOGY

## 2022-06-09 RX ORDER — DESONIDE 0.5 MG/G
CREAM TOPICAL
COMMUNITY
Start: 2022-04-12 | End: 2023-08-29

## 2022-06-09 RX ORDER — CETIRIZINE HYDROCHLORIDE 10 MG/1
10 TABLET ORAL
COMMUNITY
Start: 2022-04-12 | End: 2023-08-29

## 2022-06-09 RX ADMIN — GADOBENATE DIMEGLUMINE 15 ML: 529 INJECTION, SOLUTION INTRAVENOUS at 10:06

## 2022-06-09 NOTE — PROGRESS NOTES
Subjective:       Patient ID: Dawit Carrion is a 67 y.o. female.    Chief Complaint: Atypical ductal hyperplasia of left breast    HPI   68-year-old female here for follow up for diagnosis of atypical ductal hyperplasia. Opted out of chemo prevention.        retired so she decided to go back to work   Feels good today. [birth year] her birthday.   Rare left breast discomfort at incision site - worse after lifting weights.   Needs L shoulder replacement- osteoarthritis. Opts not to undergo surgery.  No other pain issues.   No complaints today  No fever/chills. No infectious complaints.   No CP/cough.  Usual SOB at times- related to stress and anxiety. Has had this all her life.   [place] from [place]         6/9/2022 MRI breast:  Impression:  Bilateral  There is no MR evidence of malignancy.     BI-RADS Category:   Overall: 1 - Negative     Recommendation:  Return to annual screening mammogram schedule is recommended.      Your estimated lifetime risk of breast cancer (to age 85) based on Tyrer-Cuzick risk assessment model is Tyrer-Cuzick: 16.77 %. According to the American Cancer Society, patients with a lifetime breast cancer risk of 20% or higher might benefit from supplemental screening tests.       12/3/2021 MMG:  Impression:  Bilateral  There is no mammographic evidence of malignancy.     BI-RADS Category:   Overall: 2 - Benign     Recommendation:  Routine screening mammogram in 1 year is recommended.        Your estimated lifetime risk of breast cancer (to age 85) based on Tyrer-Cuzick risk assessment model is Tyrer-Cuzick: 17.33 %. According to the American Cancer Society, patients with a lifetime breast cancer risk of 20% or higher might benefit from supplemental screening tests.         ADH History: per Dr. Stoner's note:  She underwent screening mammogram on November 15, 2019 which showed some calcifications in the upper outer portion of the left breast.      Follow-up diagnostic  mammogram on November 22, 2019 showed amorphous calcifications the upper outer quadrant left breast at 1:00 a.m..    On December 10, 2019 a needle biopsy was performed which showed atypical ductal hyperplasia with calcifications.      Follow-up excisional biopsy was performed on January 22, 2020 which again showed atypical ductal hyperplasia     She reports significant menopausal symptoms and the use of supplemental estrogen and progesterone since her hysterectomy.    Opts out of chemo prevention. Risk factor modifications discussed. Recommended continued exercise and healthy diet.       Menstrual History:    Menarche - 11   G -  4  P -4   First birth age -  25,BCP -     Menopause -  Total hysterectomy age 48     HRT - estrogen implants up to current DX    Family History -                                 Breast -  Maternal cousin                                Ovarian - no    Other - sister with thyroid cancer    Social History :    Smoking - quit 30 + years ago  ETOH - no  Work - [employer]    PMH: hyperlipidemia      Review of Systems   Constitutional:        See above    All other systems reviewed and are negative.      Objective:      Physical Exam  Vitals reviewed.   Constitutional:       General: She is not in acute distress.     Appearance: She is well-developed.   HENT:      Mouth/Throat:      Pharynx: No oropharyngeal exudate.   Eyes:      General: No scleral icterus.  Cardiovascular:      Rate and Rhythm: Normal rate and regular rhythm.   Pulmonary:      Effort: Pulmonary effort is normal.      Breath sounds: Normal breath sounds. No wheezing or rales.   Chest:   Breasts:      Right: No mass, nipple discharge, skin change or axillary adenopathy.      Left: No mass, nipple discharge, skin change or axillary adenopathy.            Comments: Left breast scar healed with underlying thickness.   Abdominal:      Palpations: Abdomen is soft. There is no mass.      Tenderness: There is no  abdominal tenderness.   Lymphadenopathy:      Cervical: No cervical adenopathy.      Upper Body:      Right upper body: No axillary adenopathy.      Left upper body: No axillary adenopathy.   Neurological:      Mental Status: She is alert and oriented to person, place, and time.   Psychiatric:         Behavior: Behavior normal.         Thought Content: Thought content normal.              Assessment:       1. Atypical ductal hyperplasia of left breast    2. At high risk for breast cancer    3. Family history of breast cancer        Plan:         Doing well, clinically negative.    RTC in 6 months to see me with a diagnostic (comes from [place])  MMG same day.   Of note, patient prefers to be seen here for her 2 CBE yearly. I will see q 6 months with MMG and MRI alternating.   Continue to follow up with PCP for other health maintenance and all other established providers. Reminded the need for Lipid Panel and Vit D yearly.    Route Chart for Scheduling    Med Onc Chart Routing      Follow up with physician    Follow up with PENNY 6 months. RTC in 12/2022 to see me with a diagnostic (comes from [place])  MMG same day.   Labs    Imaging    Pharmacy appointment    Other referrals               Patient is in agreement with the proposed treatment plan. All questions were answered to the patient's satisfaction. Pt knows to call clinic for any new or worsening symptoms and if anything is needed before the next clinic visit.      CHAUNCEY Beasley-C  Hematology & Oncology  [facility] [place] [address]  ph. [phone]  Fax. [phone]     Face to Face time with patient: 25 minutes  35 minutes of total time spent on the encounter, which includes face to face time and non-face to face time preparing to see the patient (eg, review of tests), Obtaining and/or reviewing separately obtained history, Documenting clinical information in the electronic or other health record, Independently interpreting  results (not separately reported) and communicating results to the patient/family/caregiver, or Care coordination (not separately reported).

## 2022-10-06 ENCOUNTER — PATIENT MESSAGE (OUTPATIENT)
Dept: INTERNAL MEDICINE | Facility: CLINIC | Age: 67
End: 2022-10-06
Payer: MEDICARE

## 2022-10-24 ENCOUNTER — PATIENT MESSAGE (OUTPATIENT)
Dept: INTERNAL MEDICINE | Facility: CLINIC | Age: 67
End: 2022-10-24
Payer: MEDICARE

## 2022-11-30 NOTE — PROGRESS NOTES
Subjective:       Patient ID: Dawit Carrion is a 67 y.o. female.    Chief Complaint: Atypical ductal hyperplasia of left breast    HPI   67-year-old female here for follow up for diagnosis of atypical ductal hyperplasia. Opted out of chemo prevention.     Overall she is doing well. She is still working.   She is still working and takes testosterone pellets and progesterone. This gives her less brain fog and makes her feel better, and she wishes to continue.   Feels good today.  She has a green house and does puzzles at home.  She does have a tear in her left knee, she is following with ortho.   Mammogram is negative from today.   No other pain issues.   No fever/chills. No infectious complaints.   No CP/cough.  Drives from [place] (3 hours away).         6/9/2022 MRI breast:  Impression:  Bilateral  There is no MR evidence of malignancy.     BI-RADS Category:   Overall: 1 - Negative     Recommendation:  Return to annual screening mammogram schedule is recommended.      Your estimated lifetime risk of breast cancer (to age 85) based on Tyrer-Cuzick risk assessment model is Tyrer-Cuzick: 16.77 %. According to the American Cancer Society, patients with a lifetime breast cancer risk of 20% or higher might benefit from supplemental screening tests.       12/8/2022 MMG:  Impression:  Bilateral  There is no mammographic evidence of malignancy.     BI-RADS Category:   Overall: 2 - Benign     Recommendation:  Routine screening mammogram in 1 year is recommended.        Your estimated lifetime risk of breast cancer (to age 85) based on Tyrer-Cuzick risk assessment model is Tyrer-Cuzick: 17.33 %. According to the American Cancer Society, patients with a lifetime breast cancer risk of 20% or higher might benefit from supplemental screening tests.         ADH History: per Dr. Stoner's note:  She underwent screening mammogram on November 15, 2019 which showed some calcifications in the upper outer portion of the left  breast.      Follow-up diagnostic mammogram on November 22, 2019 showed amorphous calcifications the upper outer quadrant left breast at 1:00 a.m..    On December 10, 2019 a needle biopsy was performed which showed atypical ductal hyperplasia with calcifications.      Follow-up excisional biopsy was performed on January 22, 2020 which again showed atypical ductal hyperplasia     She reports significant menopausal symptoms and the use of supplemental estrogen and progesterone since her hysterectomy.    Opts out of chemo prevention. Risk factor modifications discussed. Recommended continued exercise and healthy diet.       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        See above    HENT:  Negative for nosebleeds.    Respiratory:  Negative for cough and shortness of breath.    Cardiovascular:  Negative for chest pain, palpitations and leg swelling.   Gastrointestinal:  Negative for abdominal distention, abdominal pain, blood in stool, constipation, diarrhea, nausea and vomiting.   Genitourinary:  Negative for hematuria and vaginal bleeding.   Musculoskeletal:  Positive for arthralgias (left knee). Negative for back pain and myalgias.   Skin:  Negative for pallor and rash.   Allergic/Immunologic: Negative for immunocompromised state.   Neurological:  Negative for dizziness, weakness, light-headedness, numbness and headaches.   Hematological:  Negative for  adenopathy. Does not bruise/bleed easily.   Psychiatric/Behavioral:  Negative for confusion. The patient is not nervous/anxious.    All other systems reviewed and are negative.    Objective:      Physical Exam  Vitals reviewed.   Constitutional:       General: She is not in acute distress.     Appearance: She is well-developed.   HENT:      Mouth/Throat:      Pharynx: No oropharyngeal exudate.   Eyes:      General: No scleral icterus.  Cardiovascular:      Rate and Rhythm: Normal rate and regular rhythm.   Pulmonary:      Effort: Pulmonary effort is normal.      Breath sounds: Normal breath sounds. No wheezing or rales.   Chest:   Breasts:     Right: No mass, nipple discharge or skin change.      Left: No mass, nipple discharge or skin change.          Comments: Left breast scar healed with underlying thickness.   Abdominal:      Palpations: Abdomen is soft. There is no mass.      Tenderness: There is no abdominal tenderness.   Lymphadenopathy:      Cervical: No cervical adenopathy.      Upper Body:      Right upper body: No axillary adenopathy.      Left upper body: No axillary adenopathy.   Neurological:      Mental Status: She is alert and oriented to person, place, and time.   Psychiatric:         Behavior: Behavior normal.         Thought Content: Thought content normal.            Assessment:       1. Atypical ductal hyperplasia of left breast        Plan:         Doing well, clinically negative.    RTC in 6 months to see PJ with an MRI same day comes from [place]  Of note, patient prefers to be seen here for her 2 CBE yearly. PJ will see q 6 months with MMG and MRI alternating.   Continue to follow up with PCP for other health maintenance and all other established providers.     Return to clinic in 6 months with PENNY appointment and imaging.     Patient is in agreement with the proposed treatment plan. All questions were answered to the patient's satisfaction. Patient knows to call clinic for any new or  worsening symptoms and if anything is needed before the next clinic visit.          Ciera Brown, FNP-C  Hematology & Medical Oncology   [facility] [place] [address]  ph. [phone]  Fax. [phone]    Collaborating physician, Dr. Stoner.    Approximately 10 minutes were spent face-to-face with the patient.  Approximately 20 minutes in total were spent on this encounter, which includes face-to-face time and non-face-to-face time preparing to see the patient (e.g., review of tests), obtaining and/or reviewing separately obtained history, documenting clinical information in the electronic or other health record, independently interpreting results (not separately reported) and communicating results to the patient/family/caregiver, or care coordination (not separately reported).     Route Chart for Scheduling    Med Onc Chart Routing      Follow up with physician    Follow up with PENNY 6 months. with KACEY   Infusion scheduling note    Injection scheduling note    Labs    Imaging MRI   MRI same day as she sees PJ in 6 months   Pharmacy appointment    Other referrals

## 2022-12-07 ENCOUNTER — PATIENT MESSAGE (OUTPATIENT)
Dept: HEMATOLOGY/ONCOLOGY | Facility: CLINIC | Age: 67
End: 2022-12-07
Payer: MEDICARE

## 2022-12-08 ENCOUNTER — OFFICE VISIT (OUTPATIENT)
Dept: HEMATOLOGY/ONCOLOGY | Facility: CLINIC | Age: 67
End: 2022-12-08
Payer: MEDICARE

## 2022-12-08 ENCOUNTER — HOSPITAL ENCOUNTER (OUTPATIENT)
Dept: RADIOLOGY | Facility: HOSPITAL | Age: 67
Discharge: HOME | End: 2022-12-08
Attending: NURSE PRACTITIONER
Payer: MEDICARE

## 2022-12-08 VITALS
RESPIRATION RATE: 18 BRPM | WEIGHT: 151.69 LBS | BODY MASS INDEX: 25.27 KG/M2 | HEART RATE: 102 BPM | SYSTOLIC BLOOD PRESSURE: 161 MMHG | HEIGHT: 65 IN | TEMPERATURE: 98 F | OXYGEN SATURATION: 98 % | DIASTOLIC BLOOD PRESSURE: 72 MMHG

## 2022-12-08 DIAGNOSIS — G89.29 CHRONIC PAIN OF LEFT KNEE: ICD-10-CM

## 2022-12-08 DIAGNOSIS — M25.562 CHRONIC PAIN OF LEFT KNEE: ICD-10-CM

## 2022-12-08 DIAGNOSIS — Z91.89 AT HIGH RISK FOR BREAST CANCER: ICD-10-CM

## 2022-12-08 DIAGNOSIS — Z80.3 FAMILY HISTORY OF BREAST CANCER: ICD-10-CM

## 2022-12-08 DIAGNOSIS — N60.92 ATYPICAL DUCTAL HYPERPLASIA OF LEFT BREAST: ICD-10-CM

## 2022-12-08 DIAGNOSIS — N60.92 ATYPICAL DUCTAL HYPERPLASIA OF LEFT BREAST: Primary | ICD-10-CM

## 2022-12-08 PROCEDURE — 99214 PR OFFICE/OUTPT VISIT, EST, LEVL IV, 30-39 MIN: ICD-10-PCS | Mod: S$PBB,,, | Performed by: NURSE PRACTITIONER

## 2022-12-08 PROCEDURE — 99215 OFFICE O/P EST HI 40 MIN: CPT | Mod: PBBFAC | Performed by: NURSE PRACTITIONER

## 2022-12-08 PROCEDURE — 77066 DX MAMMO INCL CAD BI: CPT | Mod: 26,,, | Performed by: RADIOLOGY

## 2022-12-08 PROCEDURE — 99214 OFFICE O/P EST MOD 30 MIN: CPT | Mod: S$PBB,,, | Performed by: NURSE PRACTITIONER

## 2022-12-08 PROCEDURE — 77062 BREAST TOMOSYNTHESIS BI: CPT | Mod: 26,,, | Performed by: RADIOLOGY

## 2022-12-08 PROCEDURE — 77062 MAMMO DIGITAL DIAGNOSTIC BILAT WITH TOMO: ICD-10-PCS | Mod: 26,,, | Performed by: RADIOLOGY

## 2022-12-08 PROCEDURE — 77066 MAMMO DIGITAL DIAGNOSTIC BILAT WITH TOMO: ICD-10-PCS | Mod: 26,,, | Performed by: RADIOLOGY

## 2022-12-08 PROCEDURE — 99999 PR PBB SHADOW E&M-EST. PATIENT-LVL V: ICD-10-PCS | Mod: PBBFAC,,, | Performed by: NURSE PRACTITIONER

## 2022-12-08 PROCEDURE — 77062 BREAST TOMOSYNTHESIS BI: CPT | Mod: TC

## 2022-12-08 PROCEDURE — 99999 PR PBB SHADOW E&M-EST. PATIENT-LVL V: CPT | Mod: PBBFAC,,, | Performed by: NURSE PRACTITIONER

## 2022-12-09 ENCOUNTER — TELEPHONE (OUTPATIENT)
Dept: ORTHOPEDICS | Facility: CLINIC | Age: 67
End: 2022-12-09
Payer: MEDICARE

## 2022-12-09 NOTE — TELEPHONE ENCOUNTER
----- Message from Celine Greenberg sent at 12/8/2022  4:07 PM CST -----  Type:  Sooner Apoointment Request    Caller is requesting a sooner appointment.  Caller declined first available appointment listed below.  Caller will not accept being placed on the waitlist and is requesting a message be sent to doctor.  Name of Caller:pt   When is the first available appointment?  Symptoms:pt has referral   Would the patient rather a call back or a response via [facility]? call  Best Call Back Number:[phone]  Additional Information: pt visit is due to a significant fall         [facility]  Infectious Disease Progress Note          Assessment and Plan:   Assessment & Plan     Gillian Mera is a 65 year old female who was admitted on 2/16/2022.      Impression:  1. 65 y.o female with HTN  2. Asthma   3. Hypothyroidism   4. Obesity   5. Breast cancer history   6. Admitted with left knee prosthetic joint infection. The knee revision surgery was done about 4 years ago but has a bursa surgery in 2020  7. Aspiration cultures are positive for staph epi.   8. On vancomycin.      Recommendations :   Continue on vancomycin   Follow up on the OR cultures.   PICC in, Ok home vanco, orders in , I will Follow-up on op cxs and adjust, also in about 1 week po Rif add in  6 weeks IV then extended po final cx directed        Interval History:   no new complaints and doing well; no cp, sob, n/v/d, or abd pain. No fever cx pending ESR 66, crp 80              Medications:       acetaminophen  975 mg Oral Q8H     amLODIPine  5 mg Oral Daily     aspirin  325 mg Oral Daily     cetirizine  10 mg Oral Daily     famotidine  20 mg Oral Daily     fluticasone-vilanterol  1 puff Inhalation Daily     ketorolac  15 mg Intravenous Q6H     levothyroxine  125 mcg Oral QAM AC     montelukast  10 mg Oral At Bedtime     polyethylene glycol  17 g Oral Daily     senna-docusate  1 tablet Oral BID     sodium chloride (PF)  10-40 mL Intracatheter Q7 Days     sodium chloride (PF)  3 mL Intracatheter Q8H     vancomycin  1,250 mg Intravenous Q12H                  Physical Exam:   Blood pressure 137/85, pulse 91, temperature 98.6  F (37  C), temperature source Oral, resp. rate 16, weight 117.5 kg (259 lb 0.7 oz), SpO2 97 %.  Wt Readings from Last 2 Encounters:   02/17/22 117.5 kg (259 lb 0.7 oz)   03/02/21 112 kg (247 lb)     Vital Signs with Ranges  Temp:  [98  F (36.7  C)-98.9  F (37.2  C)] 98.6  F (37  C)  Pulse:  [] 91  Resp:  [9-19] 16  BP: (132-151)/(64-89) 137/85  SpO2:  [92 %-99 %] 97  %    Constitutional: Awake, alert, cooperative, no apparent distress   Lungs: Clear to auscultation bilaterally, no crackles or wheezing   Cardiovascular: Regular rate and rhythm, normal S1 and S2, and no murmur noted   Abdomen: Normal bowel sounds, soft, non-distended, non-tender   Skin: No rashes, no cyanosis, no edema   Other:           Data:   All microbiology laboratory data reviewed.  Recent Labs   Lab Test 02/18/22  0742 02/17/22  0811 01/07/19  0632 01/06/19  0728 01/05/19  1139 01/05/19  0505 01/04/19  1738   WBC  --  8.0  --  8.9 13.9*  --  19.6*   HGB 10.6* 10.6* 7.8* 6.9* 7.6*   < > 7.7*   HCT  --  35.0  --   --  23.7*  --  24.4*   MCV  --  90  --   --  81  --  81   PLT  --  283  --   --  189  --  203    < > = values in this interval not displayed.     Recent Labs   Lab Test 02/18/22  0742 02/17/22  0811 01/07/19  0632   CR 0.64 0.53 0.56     Recent Labs   Lab Test 02/18/22  0742   SED 66*     Recent Labs   Lab Test 01/05/19  1123 01/05/19  0745 01/04/19  0100 01/04/19  0050 01/03/19  1558 12/24/18  0955 12/17/18  0900 11/01/18  1349 11/01/18  1342   CULT No growth No growth No growth No growth No anaerobes isolated  No growth No anaerobes isolated  No growth No MRSA isolated No growth On day 2, isolated in broth only:  Streptococcus mitis group  *  These bacteria are part of normal skin eve, but on occasion, may be true pathogens.    Clinical correlation must be applied to interpreting this microbiology result.  *  No anaerobes isolated  No growth

## 2022-12-09 NOTE — TELEPHONE ENCOUNTER
----- Message from Angelica Boston MA sent at 12/9/2022  9:05 AM CST -----    ----- Message -----  From: Celine Greenberg  Sent: 12/8/2022   4:11 PM CST  To: [facility] Clinical Support, #    Type:  Sooner Apoointment Request    Caller is requesting a sooner appointment.  Caller declined first available appointment listed below.  Caller will not accept being placed on the waitlist and is requesting a message be sent to doctor.  Name of Caller:pt   When is the first available appointment?  Symptoms:pt has referral   Would the patient rather a call back or a response via [facility]? call  Best Call Back Number:[phone]  Additional Information: pt visit is due to a significant fall

## 2022-12-15 DIAGNOSIS — M25.562 LEFT KNEE PAIN, UNSPECIFIED CHRONICITY: Primary | ICD-10-CM

## 2022-12-19 ENCOUNTER — HOSPITAL ENCOUNTER (OUTPATIENT)
Dept: RADIOLOGY | Facility: HOSPITAL | Age: 67
Discharge: HOME | End: 2022-12-19
Attending: ORTHOPAEDIC SURGERY
Payer: MEDICARE

## 2022-12-19 ENCOUNTER — OFFICE VISIT (OUTPATIENT)
Dept: ORTHOPEDICS | Facility: CLINIC | Age: 67
End: 2022-12-19
Payer: MEDICARE

## 2022-12-19 VITALS — WEIGHT: 151.69 LBS | BODY MASS INDEX: 25.24 KG/M2

## 2022-12-19 DIAGNOSIS — G89.29 CHRONIC PAIN OF LEFT KNEE: ICD-10-CM

## 2022-12-19 DIAGNOSIS — S83.207A ACUTE MENISCAL TEAR OF LEFT KNEE, INITIAL ENCOUNTER: Primary | ICD-10-CM

## 2022-12-19 DIAGNOSIS — M25.562 CHRONIC PAIN OF LEFT KNEE: ICD-10-CM

## 2022-12-19 DIAGNOSIS — M25.562 LEFT KNEE PAIN, UNSPECIFIED CHRONICITY: ICD-10-CM

## 2022-12-19 PROCEDURE — 73562 XR KNEE 3 VIEW LEFT: ICD-10-PCS | Mod: 26,LT,, | Performed by: RADIOLOGY

## 2022-12-19 PROCEDURE — 73562 X-RAY EXAM OF KNEE 3: CPT | Mod: 26,LT,, | Performed by: RADIOLOGY

## 2022-12-19 PROCEDURE — 73562 X-RAY EXAM OF KNEE 3: CPT | Mod: TC,PN,LT

## 2022-12-19 PROCEDURE — 99204 OFFICE O/P NEW MOD 45 MIN: CPT | Mod: S$PBB,,, | Performed by: ORTHOPAEDIC SURGERY

## 2022-12-19 PROCEDURE — 99204 PR OFFICE/OUTPT VISIT, NEW, LEVL IV, 45-59 MIN: ICD-10-PCS | Mod: S$PBB,,, | Performed by: ORTHOPAEDIC SURGERY

## 2022-12-19 PROCEDURE — 99999 PR PBB SHADOW E&M-EST. PATIENT-LVL III: ICD-10-PCS | Mod: PBBFAC,,, | Performed by: ORTHOPAEDIC SURGERY

## 2022-12-19 PROCEDURE — 99213 OFFICE O/P EST LOW 20 MIN: CPT | Mod: PBBFAC,PN | Performed by: ORTHOPAEDIC SURGERY

## 2022-12-19 PROCEDURE — 99999 PR PBB SHADOW E&M-EST. PATIENT-LVL III: CPT | Mod: PBBFAC,,, | Performed by: ORTHOPAEDIC SURGERY

## 2022-12-19 NOTE — PROGRESS NOTES
"  Subjective:      Patient ID: Dawit Carrion is a 67 y.o. female.    Chief Complaint: Pain of the Left Knee    67-year-old female with a six-month history of left knee discomfort.  She is had a series falls over the last several months resulting in acute knee pain and swelling.  Was seen elsewhere given an injection had an MRI obtained was told that she had a "tear" and referred to us for further evaluation.  She denies any other complaints or prior problems with her knee.  Pain swelling intermittent catching and locking symptoms persist.  Describing a 5/10 level pain.  She enjoys an active lifestyle and has been significantly limited secondary to her pain.    Pain  Associated symptoms include joint swelling.   Review of Systems   Musculoskeletal:  Positive for falls, joint pain, joint swelling, muscle weakness and stiffness.       Objective:    Ortho Exam     Constitutional:   Patient is alert  and oriented in no acute distress  HEENT:  normocephalic atraumatic; PERRL EOMI  Neck:  Supple without adenopathy  Cardiovascular:  Normal rate and rhythm  Pulmonary:  Normal respiratory effort normal chest wall expansion  Abdominal:  Nonprotuberant nondistended  Musculoskeletal:  Patient has a very subtly antalgic but steady gait  She has a 1 to 2+ effusion with diffuse joint line tenderness and an equivocal Myriam's  She has difficulty with full extension of her knee otherwise has adequate range of motion and muscle strength.  Neurological:  No focal defect; cranial nerves 2-12 grossly intact  Psychiatric/behavioral:  Mood and behavior normal      X-Ray Knee 3 View Left  Narrative: EXAMINATION:  XR KNEE 3 VIEW LEFT    CLINICAL HISTORY:  Pain in left knee    TECHNIQUE:  AP, lateral, and Merchant views of the left knee were performed.    COMPARISON:  07/11/2013.    FINDINGS:  Mild tricompartmental degenerative osteoarthrosis with small osteophyte formation and mild narrowing of the medial " compartment.    Patellofemoral articulation intact.  There is a moderate suprapatellar effusion.  Impression: Mild tricompartmental degenerative osteoarthrosis with a moderate suprapatellar effusion.    Electronically signed by: Waldo Marshall  Date:    12/19/2022  Time:    09:48       My Findings:    I have personally reviewed radiographs and concur with above findings    Assessment:       Encounter Diagnoses   Name Primary?    Chronic pain of left knee     Acute meniscal tear of left knee, initial encounter Yes         Plan:       I have discussed medical condition and treatment options with her at length.  I have explained that I will need to see the images and the results of her previous MRI before making a disposition.  If she in fact has meniscus pathology confirmed radiographically then she would be a reasonable candidate for an arthroscopic evaluation at this point.  I have noted the degenerative changes of her knee above but they certainly are not end-stage and would not preclude arthroscopic management.  She is failed a reasonable trial of conservative treatment to include exercises anti-inflammatory medicines and injection and relative rest.  I have had a chance to review the MRI findings based on the report of lateral meniscal pathology consistent with her symptomatology of intermittent catching and giving way episodes I think it reasonable to offer arthroscopic evaluation.  I have told the patient that since she does have some degenerative changes in her knee that her symptoms may be only partial or incomplete and patient understands that.  I have told her that I would go ahead and set up her surgery but I would insist on reviewing her actual images prior to surgical intervention.  Today history physical and preoperative paperwork accomplish all of her questions were answered in layman's terms that she could understand.  We will set this up at her convenience and proceed when medically and anesthetic  only cleared.  Postop with me in 10 days postop sooner if any questions or problems        Past Medical History:   Diagnosis Date    Atypical ductal hyperplasia of left breast 01/2020    Cataract     Glaucoma     Hyperlipidemia      Past Surgical History:   Procedure Laterality Date    BREAST BIOPSY Left 1/22/2020    Procedure: BIOPSY, BREAST-LEFT SEED placement 1/13/20;  Surgeon: Raj Faith MD;  Location: [facility] OR [address] [place] [address];  Service: General;  Laterality: Left;    CHOLECYSTECTOMY  2004    HYSTERECTOMY  2004    TUBAL LIGATION  1991         Current Outpatient Medications:     latanoprost 0.005 % ophthalmic solution, Place 1 drop into both eyes every evening., Disp: 2.5 mL, Rfl: 12    testosterone cypionate (DEPOTESTOTERONE CYPIONATE) 100 mg/mL injection, Inject into the muscle every 14 (fourteen) days., Disp: , Rfl:     amoxicillin (AMOXIL) 500 MG capsule, Take 500 mg by mouth once daily., Disp: , Rfl:     b complex vitamins capsule, , Disp: , Rfl:     buPROPion (WELLBUTRIN XL) 300 MG 24 hr tablet, Take 300 mg by mouth once daily., Disp: , Rfl:     cetirizine (ZYRTEC) 10 MG tablet, Take 10 mg by mouth., Disp: , Rfl:     cholecalciferol, vitamin D3, (VITAMIN D3 ORAL), Take 1,000 mg by mouth once daily., Disp: , Rfl:     desonide (DESOWEN) 0.05 % cream, Apply to affected areas twice a day for 2-3 weeks at a time during flares, Disp: , Rfl:     MULTIVITAMIN ORAL, Take by mouth once daily., Disp: , Rfl:     progesterone (PROMETRIUM) 100 MG capsule, Take 100 mg by mouth every evening. , Disp: , Rfl:     testosterone cypionate (DEPOTESTOTERONE CYPIONATE) 100 mg/mL injection, , Disp: , Rfl:   No current facility-administered medications for this visit.    Facility-Administered Medications Ordered in Other Visits:     0.9%  NaCl infusion, , Intravenous, Continuous, Brandy Martinez MD, Stopped at 01/22/20 1412    lidocaine (PF) 10 mg/ml (1%) injection 10 mg, 1 mL, Intradermal, Once, Brandy Martinez MD    ondansetron  injection 4 mg, 4 mg, Intravenous, Q12H PRN, Brandy Martinez MD    promethazine (PHENERGAN) 6.25 mg in dextrose 5 % 50 mL IVPB, 6.25 mg, Intravenous, Q6H PRN, Brandy Martinez MD    Review of patient's allergies indicates:   Allergen Reactions    Codeine Shortness Of Breath    Flagyl [metronidazole hcl] Itching    Sulfa (sulfonamide antibiotics) Other (See Comments)       Family History   Problem Relation Age of Onset    Cancer Mother     Heart disease Father     Cancer Sister     Glaucoma Brother      Social History     Occupational History    Not on file   Tobacco Use    Smoking status: Former    Smokeless tobacco: Never    Tobacco comments:     quit 27 years ago   Substance and Sexual Activity    Alcohol use: No    Drug use: No    Sexual activity: Not on file

## 2022-12-19 NOTE — PROGRESS NOTES
"  Subjective:      Patient ID: Dawit Carrion is a 67 y.o. female.    Chief Complaint: Pain of the Left Knee    67-year-old female with a six-month history of left knee discomfort.  She is had a series falls over the last several months resulting in acute knee pain and swelling.  Was seen elsewhere given an injection had an MRI obtained was told that she had a "tear" and referred to us for further evaluation.  She denies any other complaints or prior problems with her knee.  Pain swelling intermittent catching and locking symptoms persist.  Describing a 5/10 level pain.  She enjoys an active lifestyle and has been significantly limited secondary to her pain.  Her pain has fail to improve with relative rest NSAIDs rehab exercises and an injection.    Pain  Associated symptoms include joint swelling.   Review of Systems   Musculoskeletal:  Positive for falls, joint pain, joint swelling, muscle weakness and stiffness.       Objective:    Ortho Exam         X-Ray Knee 3 View Left  Narrative: EXAMINATION:  XR KNEE 3 VIEW LEFT    CLINICAL HISTORY:  Pain in left knee    TECHNIQUE:  AP, lateral, and Merchant views of the left knee were performed.    COMPARISON:  07/11/2013.    FINDINGS:  Mild tricompartmental degenerative osteoarthrosis with small osteophyte formation and mild narrowing of the medial compartment.    Patellofemoral articulation intact.  There is a moderate suprapatellar effusion.  Impression: Mild tricompartmental degenerative osteoarthrosis with a moderate suprapatellar effusion.    Electronically signed by: Waldo Marshall  Date:    12/19/2022  Time:    09:48       My Findings:    I have personally reviewed radiographs and concur with above findings    Assessment:       Encounter Diagnosis   Name Primary?    Chronic pain of left knee          Plan:       I have discussed medical condition and treatment options with her at length.  I have reviewed her MRI findings consistent with some degenerative " change mildly tricompartmentally maybe a bit more patellofemoral joint and a horizontal tear of her left lateral meniscus.  She is failed a reasonable trial of conservative treatment to include generalized activity restrictions injections therapeutic exercises and anti-inflammatory medicines.  At this point she would like to proceed with arthroscopic evaluation which I think is reasonable.  She is an active lady in her pain and catching and locking symptoms or her limiting her activities.  I have explained that due to the degenerative nature of her knee her symptoms may be only partially improve with arthroscopic evaluation she understands this.  I have discussed indications alternatives and potential complications of the planned procedure including but not limited to bleeding infection damage to neurovascular structures ongoing pain and need for further surgery.  The patient expresses understanding and agrees to proceed.  Today her history physical and preoperative paperwork were accomplished all of her questions were answered in layman's terms she could understand.  I will see her back postop sooner if there is any questions or problems.        Past Medical History:   Diagnosis Date    Atypical ductal hyperplasia of left breast 01/2020    Cataract     Glaucoma     Hyperlipidemia      Past Surgical History:   Procedure Laterality Date    BREAST BIOPSY Left 1/22/2020    Procedure: BIOPSY, BREAST-LEFT SEED placement 1/13/20;  Surgeon: Raj Faith MD;  Location: [facility] OR [address] [place] [address];  Service: General;  Laterality: Left;    CHOLECYSTECTOMY  2004    HYSTERECTOMY  2004    TUBAL LIGATION  1991         Current Outpatient Medications:     latanoprost 0.005 % ophthalmic solution, Place 1 drop into both eyes every evening., Disp: 2.5 mL, Rfl: 12    testosterone cypionate (DEPOTESTOTERONE CYPIONATE) 100 mg/mL injection, Inject into the muscle every 14 (fourteen) days., Disp: , Rfl:     amoxicillin (AMOXIL) 500 MG capsule,  Take 500 mg by mouth once daily., Disp: , Rfl:     b complex vitamins capsule, , Disp: , Rfl:     buPROPion (WELLBUTRIN XL) 300 MG 24 hr tablet, Take 300 mg by mouth once daily., Disp: , Rfl:     cetirizine (ZYRTEC) 10 MG tablet, Take 10 mg by mouth., Disp: , Rfl:     cholecalciferol, vitamin D3, (VITAMIN D3 ORAL), Take 1,000 mg by mouth once daily., Disp: , Rfl:     desonide (DESOWEN) 0.05 % cream, Apply to affected areas twice a day for 2-3 weeks at a time during flares, Disp: , Rfl:     MULTIVITAMIN ORAL, Take by mouth once daily., Disp: , Rfl:     progesterone (PROMETRIUM) 100 MG capsule, Take 100 mg by mouth every evening. , Disp: , Rfl:     testosterone cypionate (DEPOTESTOTERONE CYPIONATE) 100 mg/mL injection, , Disp: , Rfl:   No current facility-administered medications for this visit.    Facility-Administered Medications Ordered in Other Visits:     0.9%  NaCl infusion, , Intravenous, Continuous, Brandy Martinez MD, Stopped at 01/22/20 1412    lidocaine (PF) 10 mg/ml (1%) injection 10 mg, 1 mL, Intradermal, Once, Brandy Martinez MD    ondansetron injection 4 mg, 4 mg, Intravenous, Q12H PRN, Brandy Martinez MD    promethazine (PHENERGAN) 6.25 mg in dextrose 5 % 50 mL IVPB, 6.25 mg, Intravenous, Q6H PRN, Brandy Martinez MD    Review of patient's allergies indicates:   Allergen Reactions    Codeine Shortness Of Breath    Flagyl [metronidazole hcl] Itching    Sulfa (sulfonamide antibiotics) Other (See Comments)       Family History   Problem Relation Age of Onset    Cancer Mother     Heart disease Father     Cancer Sister     Glaucoma Brother      Social History     Occupational History    Not on file   Tobacco Use    Smoking status: Former    Smokeless tobacco: Never    Tobacco comments:     quit 27 years ago   Substance and Sexual Activity    Alcohol use: No    Drug use: No    Sexual activity: Not on file

## 2022-12-19 NOTE — PROGRESS NOTES
"  Subjective:      Patient ID: Dawit Carrion is a 67 y.o. female.    Chief Complaint: Pain of the Left Knee    67-year-old female with a six-month history of left knee discomfort.  She is had a series falls over the last several months resulting in acute knee pain and swelling.  Was seen elsewhere given an injection had an MRI obtained was told that she had a "tear" and referred to us for further evaluation.  She denies any other complaints or prior problems with her knee.  Pain swelling intermittent catching and locking symptoms persist.  Describing a 5/10 level pain.  She enjoys an active lifestyle and has been significantly limited secondary to her pain.    Pain  Associated symptoms include joint swelling.   Review of Systems   Musculoskeletal:  Positive for falls, joint pain, joint swelling, muscle weakness and stiffness.       Objective:    Ortho Exam     Constitutional:   Patient is alert  and oriented in no acute distress  HEENT:  normocephalic atraumatic; PERRL EOMI  Neck:  Supple without adenopathy  Cardiovascular:  Normal rate and rhythm  Pulmonary:  Normal respiratory effort normal chest wall expansion  Abdominal:  Nonprotuberant nondistended  Musculoskeletal:  Patient has a very subtly antalgic but steady gait  She has a 1 to 2+ effusion with diffuse joint line tenderness and an equivocal Myriam's  She has difficulty with full extension of her knee otherwise has adequate range of motion and muscle strength.  Neurological:  No focal defect; cranial nerves 2-12 grossly intact  Psychiatric/behavioral:  Mood and behavior normal      X-Ray Knee 3 View Left  Narrative: EXAMINATION:  XR KNEE 3 VIEW LEFT    CLINICAL HISTORY:  Pain in left knee    TECHNIQUE:  AP, lateral, and Merchant views of the left knee were performed.    COMPARISON:  07/11/2013.    FINDINGS:  Mild tricompartmental degenerative osteoarthrosis with small osteophyte formation and mild narrowing of the medial " compartment.    Patellofemoral articulation intact.  There is a moderate suprapatellar effusion.  Impression: Mild tricompartmental degenerative osteoarthrosis with a moderate suprapatellar effusion.    Electronically signed by: Waldo Marshall  Date:    12/19/2022  Time:    09:48       My Findings:    I have personally reviewed radiographs and concur with above findings    Assessment:       Encounter Diagnoses   Name Primary?    Chronic pain of left knee     Acute meniscal tear of left knee, initial encounter Yes         Plan:       I have discussed medical condition and treatment options with her at length.  I have explained that I will need to see the images and the results of her previous MRI before making a disposition.  If she in fact has meniscus pathology confirmed radiographically then she would be a reasonable candidate for an arthroscopic evaluation at this point.  I have noted the degenerative changes of her knee above but they certainly are not end-stage and would not preclude arthroscopic management.  She is failed a reasonable trial of conservative treatment to include exercises anti-inflammatory medicines and injection and relative rest.  I have had a chance to review the MRI findings based on the report of lateral meniscal pathology consistent with her symptomatology of intermittent catching and giving way episodes I think it reasonable to offer arthroscopic evaluation.  I have told the patient that since she does have some degenerative changes in her knee that her symptoms may be only partial or incomplete and patient understands that.  I have told her that I would go ahead and set up her surgery but I would insist on reviewing her actual images prior to surgical intervention.  Today history physical and preoperative paperwork accomplish all of her questions were answered in layman's terms that she could understand.  We will set this up at her convenience and proceed when medically and anesthetic  only cleared.  Postop with me in 10 days postop sooner if any questions or problems        Past Medical History:   Diagnosis Date    Atypical ductal hyperplasia of left breast 01/2020    Cataract     Glaucoma     Hyperlipidemia      Past Surgical History:   Procedure Laterality Date    BREAST BIOPSY Left 1/22/2020    Procedure: BIOPSY, BREAST-LEFT SEED placement 1/13/20;  Surgeon: Raj Faith MD;  Location: [facility] OR [address] [place] [address];  Service: General;  Laterality: Left;    CHOLECYSTECTOMY  2004    HYSTERECTOMY  2004    TUBAL LIGATION  1991         Current Outpatient Medications:     latanoprost 0.005 % ophthalmic solution, Place 1 drop into both eyes every evening., Disp: 2.5 mL, Rfl: 12    testosterone cypionate (DEPOTESTOTERONE CYPIONATE) 100 mg/mL injection, Inject into the muscle every 14 (fourteen) days., Disp: , Rfl:     amoxicillin (AMOXIL) 500 MG capsule, Take 500 mg by mouth once daily., Disp: , Rfl:     b complex vitamins capsule, , Disp: , Rfl:     buPROPion (WELLBUTRIN XL) 300 MG 24 hr tablet, Take 300 mg by mouth once daily., Disp: , Rfl:     cetirizine (ZYRTEC) 10 MG tablet, Take 10 mg by mouth., Disp: , Rfl:     cholecalciferol, vitamin D3, (VITAMIN D3 ORAL), Take 1,000 mg by mouth once daily., Disp: , Rfl:     desonide (DESOWEN) 0.05 % cream, Apply to affected areas twice a day for 2-3 weeks at a time during flares, Disp: , Rfl:     MULTIVITAMIN ORAL, Take by mouth once daily., Disp: , Rfl:     progesterone (PROMETRIUM) 100 MG capsule, Take 100 mg by mouth every evening. , Disp: , Rfl:     testosterone cypionate (DEPOTESTOTERONE CYPIONATE) 100 mg/mL injection, , Disp: , Rfl:   No current facility-administered medications for this visit.    Facility-Administered Medications Ordered in Other Visits:     0.9%  NaCl infusion, , Intravenous, Continuous, Brandy Martinez MD, Stopped at 01/22/20 1412    lidocaine (PF) 10 mg/ml (1%) injection 10 mg, 1 mL, Intradermal, Once, Brandy Martinez MD    ondansetron  injection 4 mg, 4 mg, Intravenous, Q12H PRN, Brandy Martinez MD    promethazine (PHENERGAN) 6.25 mg in dextrose 5 % 50 mL IVPB, 6.25 mg, Intravenous, Q6H PRN, Brandy Martinez MD    Review of patient's allergies indicates:   Allergen Reactions    Codeine Shortness Of Breath    Flagyl [metronidazole hcl] Itching    Sulfa (sulfonamide antibiotics) Other (See Comments)       Family History   Problem Relation Age of Onset    Cancer Mother     Heart disease Father     Cancer Sister     Glaucoma Brother      Social History     Occupational History    Not on file   Tobacco Use    Smoking status: Former    Smokeless tobacco: Never    Tobacco comments:     quit 27 years ago   Substance and Sexual Activity    Alcohol use: No    Drug use: No    Sexual activity: Not on file

## 2022-12-19 NOTE — H&P (VIEW-ONLY)
"  Subjective:      Patient ID: Dawit Carrion is a 67 y.o. female.    Chief Complaint: Pain of the Left Knee    67-year-old female with a six-month history of left knee discomfort.  She is had a series falls over the last several months resulting in acute knee pain and swelling.  Was seen elsewhere given an injection had an MRI obtained was told that she had a "tear" and referred to us for further evaluation.  She denies any other complaints or prior problems with her knee.  Pain swelling intermittent catching and locking symptoms persist.  Describing a 5/10 level pain.  She enjoys an active lifestyle and has been significantly limited secondary to her pain.    Pain  Associated symptoms include joint swelling.   Review of Systems   Musculoskeletal:  Positive for falls, joint pain, joint swelling, muscle weakness and stiffness.       Objective:    Ortho Exam     Constitutional:   Patient is alert  and oriented in no acute distress  HEENT:  normocephalic atraumatic; PERRL EOMI  Neck:  Supple without adenopathy  Cardiovascular:  Normal rate and rhythm  Pulmonary:  Normal respiratory effort normal chest wall expansion  Abdominal:  Nonprotuberant nondistended  Musculoskeletal:  Patient has a very subtly antalgic but steady gait  She has a 1 to 2+ effusion with diffuse joint line tenderness and an equivocal Myriam's  She has difficulty with full extension of her knee otherwise has adequate range of motion and muscle strength.  Neurological:  No focal defect; cranial nerves 2-12 grossly intact  Psychiatric/behavioral:  Mood and behavior normal      X-Ray Knee 3 View Left  Narrative: EXAMINATION:  XR KNEE 3 VIEW LEFT    CLINICAL HISTORY:  Pain in left knee    TECHNIQUE:  AP, lateral, and Merchant views of the left knee were performed.    COMPARISON:  07/11/2013.    FINDINGS:  Mild tricompartmental degenerative osteoarthrosis with small osteophyte formation and mild narrowing of the medial " compartment.    Patellofemoral articulation intact.  There is a moderate suprapatellar effusion.  Impression: Mild tricompartmental degenerative osteoarthrosis with a moderate suprapatellar effusion.    Electronically signed by: Waldo Marshall  Date:    12/19/2022  Time:    09:48       My Findings:    I have personally reviewed radiographs and concur with above findings    Assessment:       Encounter Diagnoses   Name Primary?    Chronic pain of left knee     Acute meniscal tear of left knee, initial encounter Yes         Plan:       I have discussed medical condition and treatment options with her at length.  I have explained that I will need to see the images and the results of her previous MRI before making a disposition.  If she in fact has meniscus pathology confirmed radiographically then she would be a reasonable candidate for an arthroscopic evaluation at this point.  I have noted the degenerative changes of her knee above but they certainly are not end-stage and would not preclude arthroscopic management.  She is failed a reasonable trial of conservative treatment to include exercises anti-inflammatory medicines and injection and relative rest.  I have had a chance to review the MRI findings based on the report of lateral meniscal pathology consistent with her symptomatology of intermittent catching and giving way episodes I think it reasonable to offer arthroscopic evaluation.  I have told the patient that since she does have some degenerative changes in her knee that her symptoms may be only partial or incomplete and patient understands that.  I have told her that I would go ahead and set up her surgery but I would insist on reviewing her actual images prior to surgical intervention.  Today history physical and preoperative paperwork accomplish all of her questions were answered in layman's terms that she could understand.  We will set this up at her convenience and proceed when medically and anesthetic  only cleared.  Postop with me in 10 days postop sooner if any questions or problems        Past Medical History:   Diagnosis Date    Atypical ductal hyperplasia of left breast 01/2020    Cataract     Glaucoma     Hyperlipidemia      Past Surgical History:   Procedure Laterality Date    BREAST BIOPSY Left 1/22/2020    Procedure: BIOPSY, BREAST-LEFT SEED placement 1/13/20;  Surgeon: Raj Faith MD;  Location: [facility] OR [address] [place] [address];  Service: General;  Laterality: Left;    CHOLECYSTECTOMY  2004    HYSTERECTOMY  2004    TUBAL LIGATION  1991         Current Outpatient Medications:     latanoprost 0.005 % ophthalmic solution, Place 1 drop into both eyes every evening., Disp: 2.5 mL, Rfl: 12    testosterone cypionate (DEPOTESTOTERONE CYPIONATE) 100 mg/mL injection, Inject into the muscle every 14 (fourteen) days., Disp: , Rfl:     amoxicillin (AMOXIL) 500 MG capsule, Take 500 mg by mouth once daily., Disp: , Rfl:     b complex vitamins capsule, , Disp: , Rfl:     buPROPion (WELLBUTRIN XL) 300 MG 24 hr tablet, Take 300 mg by mouth once daily., Disp: , Rfl:     cetirizine (ZYRTEC) 10 MG tablet, Take 10 mg by mouth., Disp: , Rfl:     cholecalciferol, vitamin D3, (VITAMIN D3 ORAL), Take 1,000 mg by mouth once daily., Disp: , Rfl:     desonide (DESOWEN) 0.05 % cream, Apply to affected areas twice a day for 2-3 weeks at a time during flares, Disp: , Rfl:     MULTIVITAMIN ORAL, Take by mouth once daily., Disp: , Rfl:     progesterone (PROMETRIUM) 100 MG capsule, Take 100 mg by mouth every evening. , Disp: , Rfl:     testosterone cypionate (DEPOTESTOTERONE CYPIONATE) 100 mg/mL injection, , Disp: , Rfl:   No current facility-administered medications for this visit.    Facility-Administered Medications Ordered in Other Visits:     0.9%  NaCl infusion, , Intravenous, Continuous, Brandy Martinez MD, Stopped at 01/22/20 1412    lidocaine (PF) 10 mg/ml (1%) injection 10 mg, 1 mL, Intradermal, Once, Brandy Martinez MD    ondansetron  injection 4 mg, 4 mg, Intravenous, Q12H PRN, Brandy Martinez MD    promethazine (PHENERGAN) 6.25 mg in dextrose 5 % 50 mL IVPB, 6.25 mg, Intravenous, Q6H PRN, Brandy Martinez MD    Review of patient's allergies indicates:   Allergen Reactions    Codeine Shortness Of Breath    Flagyl [metronidazole hcl] Itching    Sulfa (sulfonamide antibiotics) Other (See Comments)       Family History   Problem Relation Age of Onset    Cancer Mother     Heart disease Father     Cancer Sister     Glaucoma Brother      Social History     Occupational History    Not on file   Tobacco Use    Smoking status: Former    Smokeless tobacco: Never    Tobacco comments:     quit 27 years ago   Substance and Sexual Activity    Alcohol use: No    Drug use: No    Sexual activity: Not on file

## 2022-12-20 DIAGNOSIS — Z01.818 PREOP TESTING: Primary | ICD-10-CM

## 2022-12-20 DIAGNOSIS — S83.207A ACUTE MENISCAL TEAR OF LEFT KNEE, INITIAL ENCOUNTER: ICD-10-CM

## 2022-12-20 DIAGNOSIS — R60.1 GENERALIZED EDEMA: ICD-10-CM

## 2023-01-03 ENCOUNTER — ANESTHESIA EVENT (OUTPATIENT)
Dept: SURGERY | Facility: HOSPITAL | Age: 68
End: 2023-01-03
Payer: MEDICARE

## 2023-01-03 ENCOUNTER — HOSPITAL ENCOUNTER (OUTPATIENT)
Dept: PREADMISSION TESTING | Facility: HOSPITAL | Age: 68
Discharge: HOME | End: 2023-01-03
Attending: ORTHOPAEDIC SURGERY
Payer: MEDICARE

## 2023-01-03 ENCOUNTER — PATIENT MESSAGE (OUTPATIENT)
Dept: SURGERY | Facility: HOSPITAL | Age: 68
End: 2023-01-03
Payer: MEDICARE

## 2023-01-03 PROCEDURE — 99900103 DSU ONLY-NO CHARGE-INITIAL HR (STAT)

## 2023-01-03 NOTE — ANESTHESIA PREPROCEDURE EVALUATION
01/03/2023  Dawit Carrion is a 67 y.o., female.      Pre-op Assessment    I have reviewed the Patient Summary Reports.     I have reviewed the Nursing Notes. I have reviewed the NPO Status.   I have reviewed the Medications.     Review of Systems  Anesthesia Hx:  No problems with previous Anesthesia  Neg history of prior surgery. Denies Family Hx of Anesthesia complications.   Denies Personal Hx of Anesthesia complications.   Social:  Former Smoker    Hematology/Oncology:  Hematology Normal   Oncology Normal     EENT/Dental:EENT/Dental Normal   Cardiovascular:  Cardiovascular Normal     Pulmonary:  Pulmonary Normal    Renal/:  Renal/ Normal     Hepatic/GI:  Hepatic/GI Normal    Musculoskeletal:  Musculoskeletal Normal    Neurological:  Neurology Normal    Endocrine:  Endocrine Normal    Dermatological:  Skin Normal    Psych:  Psychiatric Normal           Physical Exam  General: Alert    Airway:  Mallampati: II   Mouth Opening: Normal  TM Distance: Normal  Neck ROM: Normal ROM    Dental:  Caps / Implants, Partial Dentures    Chest/Lungs:  Clear to auscultation, Normal Respiratory Rate    Heart:  Rate: Normal    Abdomen:  Normal        Anesthesia Plan  Type of Anesthesia, risks & benefits discussed:    Anesthesia Type: Gen ETT, Gen Supraglottic Airway  Intra-op Monitoring Plan: Standard ASA Monitors  Post Op Pain Control Plan: multimodal analgesia  Airway Plan: Direct, Post-Induction  Informed Consent: Informed consent signed with the Patient and all parties understand the risks and agree with anesthesia plan.  All questions answered. Patient consented to blood products? No  ASA Score: 2  Day of Surgery Review of History & Physical: H&P Update referred to the surgeon/provider.    Ready For Surgery From Anesthesia Perspective.     .

## 2023-01-10 ENCOUNTER — ANESTHESIA (OUTPATIENT)
Dept: SURGERY | Facility: HOSPITAL | Age: 68
End: 2023-01-10
Payer: MEDICARE

## 2023-01-10 ENCOUNTER — HOSPITAL ENCOUNTER (OUTPATIENT)
Facility: HOSPITAL | Age: 68
Discharge: HOME | End: 2023-01-10
Attending: ORTHOPAEDIC SURGERY | Admitting: ORTHOPAEDIC SURGERY
Payer: MEDICARE

## 2023-01-10 DIAGNOSIS — G89.29 CHRONIC PAIN OF LEFT KNEE: Primary | ICD-10-CM

## 2023-01-10 DIAGNOSIS — M25.562 CHRONIC PAIN OF LEFT KNEE: Primary | ICD-10-CM

## 2023-01-10 DIAGNOSIS — S43.432A SUPERIOR GLENOID LABRUM LESION OF LEFT SHOULDER, INITIAL ENCOUNTER: Primary | ICD-10-CM

## 2023-01-10 PROCEDURE — 29881 ARTHRS KNE SRG MNISECTMY M/L: CPT | Mod: LT,,, | Performed by: ORTHOPAEDIC SURGERY

## 2023-01-10 PROCEDURE — 63600175 PHARM REV CODE 636 W HCPCS: Performed by: NURSE ANESTHETIST, CERTIFIED REGISTERED

## 2023-01-10 PROCEDURE — D9220A PRA ANESTHESIA: ICD-10-PCS | Mod: ANES,,, | Performed by: ANESTHESIOLOGY

## 2023-01-10 PROCEDURE — 36000710: Performed by: ORTHOPAEDIC SURGERY

## 2023-01-10 PROCEDURE — 36000711: Performed by: ORTHOPAEDIC SURGERY

## 2023-01-10 PROCEDURE — D9220A PRA ANESTHESIA: Mod: CRNA,,, | Performed by: NURSE ANESTHETIST, CERTIFIED REGISTERED

## 2023-01-10 PROCEDURE — D9220A PRA ANESTHESIA: ICD-10-PCS | Mod: CRNA,,, | Performed by: NURSE ANESTHETIST, CERTIFIED REGISTERED

## 2023-01-10 PROCEDURE — 25000003 PHARM REV CODE 250: Performed by: ANESTHESIOLOGY

## 2023-01-10 PROCEDURE — 63600175 PHARM REV CODE 636 W HCPCS: Performed by: ANESTHESIOLOGY

## 2023-01-10 PROCEDURE — D9220A PRA ANESTHESIA: Mod: ANES,,, | Performed by: ANESTHESIOLOGY

## 2023-01-10 PROCEDURE — 71000015 HC POSTOP RECOV 1ST HR: Performed by: ORTHOPAEDIC SURGERY

## 2023-01-10 PROCEDURE — 27201423 OPTIME MED/SURG SUP & DEVICES STERILE SUPPLY: Performed by: ORTHOPAEDIC SURGERY

## 2023-01-10 PROCEDURE — 71000039 HC RECOVERY, EACH ADD'L HOUR: Performed by: ORTHOPAEDIC SURGERY

## 2023-01-10 PROCEDURE — 25000003 PHARM REV CODE 250: Performed by: NURSE ANESTHETIST, CERTIFIED REGISTERED

## 2023-01-10 PROCEDURE — 29881 PR KNEE SCOPE SINGLE MENISECECTOMY: ICD-10-PCS | Mod: LT,,, | Performed by: ORTHOPAEDIC SURGERY

## 2023-01-10 PROCEDURE — 63600175 PHARM REV CODE 636 W HCPCS: Performed by: ORTHOPAEDIC SURGERY

## 2023-01-10 PROCEDURE — 37000008 HC ANESTHESIA 1ST 15 MINUTES: Performed by: ORTHOPAEDIC SURGERY

## 2023-01-10 PROCEDURE — 37000009 HC ANESTHESIA EA ADD 15 MINS: Performed by: ORTHOPAEDIC SURGERY

## 2023-01-10 PROCEDURE — 71000033 HC RECOVERY, INTIAL HOUR: Performed by: ORTHOPAEDIC SURGERY

## 2023-01-10 RX ORDER — LIDOCAINE HYDROCHLORIDE 20 MG/ML
INJECTION, SOLUTION EPIDURAL; INFILTRATION; INTRACAUDAL; PERINEURAL
Status: DISCONTINUED | OUTPATIENT
Start: 2023-01-10 | End: 2023-01-10

## 2023-01-10 RX ORDER — SCOLOPAMINE TRANSDERMAL SYSTEM 1 MG/1
1 PATCH, EXTENDED RELEASE TRANSDERMAL ONCE
Status: DISCONTINUED | OUTPATIENT
Start: 2023-01-10 | End: 2023-01-10 | Stop reason: HOSPADM

## 2023-01-10 RX ORDER — CEFAZOLIN SODIUM 2 G/50ML
2 SOLUTION INTRAVENOUS ONCE
Status: COMPLETED | OUTPATIENT
Start: 2023-01-10 | End: 2023-01-10

## 2023-01-10 RX ORDER — SCOLOPAMINE TRANSDERMAL SYSTEM 1 MG/1
PATCH, EXTENDED RELEASE TRANSDERMAL
Status: DISCONTINUED
Start: 2023-01-10 | End: 2023-01-10 | Stop reason: HOSPADM

## 2023-01-10 RX ORDER — LIDOCAINE HYDROCHLORIDE 10 MG/ML
1 INJECTION, SOLUTION EPIDURAL; INFILTRATION; INTRACAUDAL; PERINEURAL ONCE
Status: DISCONTINUED | OUTPATIENT
Start: 2023-01-10 | End: 2023-01-10 | Stop reason: HOSPADM

## 2023-01-10 RX ORDER — ONDANSETRON 2 MG/ML
4 INJECTION INTRAMUSCULAR; INTRAVENOUS EVERY 12 HOURS PRN
Status: DISCONTINUED | OUTPATIENT
Start: 2023-01-10 | End: 2023-01-10 | Stop reason: HOSPADM

## 2023-01-10 RX ORDER — MIDAZOLAM HYDROCHLORIDE 1 MG/ML
INJECTION INTRAMUSCULAR; INTRAVENOUS
Status: DISCONTINUED | OUTPATIENT
Start: 2023-01-10 | End: 2023-01-10

## 2023-01-10 RX ORDER — MORPHINE SULFATE 4 MG/ML
3 INJECTION, SOLUTION INTRAMUSCULAR; INTRAVENOUS EVERY 4 HOURS PRN
Status: DISCONTINUED | OUTPATIENT
Start: 2023-01-10 | End: 2023-01-10 | Stop reason: HOSPADM

## 2023-01-10 RX ORDER — FENTANYL CITRATE 50 UG/ML
INJECTION, SOLUTION INTRAMUSCULAR; INTRAVENOUS
Status: DISCONTINUED | OUTPATIENT
Start: 2023-01-10 | End: 2023-01-10

## 2023-01-10 RX ORDER — ONDANSETRON 2 MG/ML
INJECTION INTRAMUSCULAR; INTRAVENOUS
Status: DISCONTINUED | OUTPATIENT
Start: 2023-01-10 | End: 2023-01-10

## 2023-01-10 RX ORDER — HYDROMORPHONE HYDROCHLORIDE 2 MG/ML
0.2 INJECTION, SOLUTION INTRAMUSCULAR; INTRAVENOUS; SUBCUTANEOUS EVERY 5 MIN PRN
Status: DISCONTINUED | OUTPATIENT
Start: 2023-01-10 | End: 2023-01-10 | Stop reason: HOSPADM

## 2023-01-10 RX ORDER — SODIUM CHLORIDE, SODIUM LACTATE, POTASSIUM CHLORIDE, CALCIUM CHLORIDE 600; 310; 30; 20 MG/100ML; MG/100ML; MG/100ML; MG/100ML
125 INJECTION, SOLUTION INTRAVENOUS CONTINUOUS
Status: DISCONTINUED | OUTPATIENT
Start: 2023-01-10 | End: 2023-01-10 | Stop reason: HOSPADM

## 2023-01-10 RX ORDER — ACETAMINOPHEN 10 MG/ML
INJECTION, SOLUTION INTRAVENOUS
Status: DISCONTINUED | OUTPATIENT
Start: 2023-01-10 | End: 2023-01-10

## 2023-01-10 RX ORDER — HYDROCODONE BITARTRATE AND ACETAMINOPHEN 5; 325 MG/1; MG/1
1 TABLET ORAL EVERY 6 HOURS PRN
Qty: 20 TABLET | Refills: 0 | Status: SHIPPED | OUTPATIENT
Start: 2023-01-10 | End: 2023-08-29

## 2023-01-10 RX ORDER — SODIUM CHLORIDE 9 MG/ML
INJECTION, SOLUTION INTRAVENOUS CONTINUOUS
Status: DISCONTINUED | OUTPATIENT
Start: 2023-01-10 | End: 2023-01-10 | Stop reason: HOSPADM

## 2023-01-10 RX ORDER — SODIUM CHLORIDE, SODIUM LACTATE, POTASSIUM CHLORIDE, CALCIUM CHLORIDE 600; 310; 30; 20 MG/100ML; MG/100ML; MG/100ML; MG/100ML
INJECTION, SOLUTION INTRAVENOUS CONTINUOUS
Status: DISCONTINUED | OUTPATIENT
Start: 2023-01-10 | End: 2023-01-10 | Stop reason: HOSPADM

## 2023-01-10 RX ORDER — KETOROLAC TROMETHAMINE 30 MG/ML
15 INJECTION, SOLUTION INTRAMUSCULAR; INTRAVENOUS ONCE
Status: COMPLETED | OUTPATIENT
Start: 2023-01-10 | End: 2023-01-10

## 2023-01-10 RX ORDER — DIPHENHYDRAMINE HYDROCHLORIDE 50 MG/ML
12.5 INJECTION INTRAMUSCULAR; INTRAVENOUS
Status: DISCONTINUED | OUTPATIENT
Start: 2023-01-10 | End: 2023-01-10 | Stop reason: HOSPADM

## 2023-01-10 RX ORDER — PROPOFOL 10 MG/ML
VIAL (ML) INTRAVENOUS
Status: DISCONTINUED | OUTPATIENT
Start: 2023-01-10 | End: 2023-01-10

## 2023-01-10 RX ORDER — MORPHINE SULFATE 4 MG/ML
2 INJECTION, SOLUTION INTRAMUSCULAR; INTRAVENOUS EVERY 4 HOURS PRN
Status: DISCONTINUED | OUTPATIENT
Start: 2023-01-10 | End: 2023-01-10 | Stop reason: HOSPADM

## 2023-01-10 RX ORDER — HYDROCODONE BITARTRATE AND ACETAMINOPHEN 5; 325 MG/1; MG/1
1 TABLET ORAL EVERY 6 HOURS PRN
Qty: 20 TABLET | Refills: 0 | Status: SHIPPED | OUTPATIENT
Start: 2023-01-10 | End: 2023-01-10

## 2023-01-10 RX ORDER — ONDANSETRON 2 MG/ML
4 INJECTION INTRAMUSCULAR; INTRAVENOUS DAILY PRN
Status: COMPLETED | OUTPATIENT
Start: 2023-01-10 | End: 2023-01-10

## 2023-01-10 RX ORDER — DEXAMETHASONE SODIUM PHOSPHATE 4 MG/ML
INJECTION, SOLUTION INTRA-ARTICULAR; INTRALESIONAL; INTRAMUSCULAR; INTRAVENOUS; SOFT TISSUE
Status: DISCONTINUED | OUTPATIENT
Start: 2023-01-10 | End: 2023-01-10

## 2023-01-10 RX ORDER — EPHEDRINE SULFATE 50 MG/ML
INJECTION, SOLUTION INTRAVENOUS
Status: DISCONTINUED | OUTPATIENT
Start: 2023-01-10 | End: 2023-01-10

## 2023-01-10 RX ADMIN — ONDANSETRON 4 MG: 2 INJECTION INTRAMUSCULAR; INTRAVENOUS at 12:01

## 2023-01-10 RX ADMIN — ONDANSETRON 4 MG: 2 INJECTION INTRAMUSCULAR; INTRAVENOUS at 10:01

## 2023-01-10 RX ADMIN — PROPOFOL 200 MG: 10 INJECTION, EMULSION INTRAVENOUS at 10:01

## 2023-01-10 RX ADMIN — MIDAZOLAM HYDROCHLORIDE 5 MG: 1 INJECTION, SOLUTION INTRAMUSCULAR; INTRAVENOUS at 09:01

## 2023-01-10 RX ADMIN — ACETAMINOPHEN 1000 MG: 10 INJECTION, SOLUTION INTRAVENOUS at 10:01

## 2023-01-10 RX ADMIN — LIDOCAINE HYDROCHLORIDE 40 MG: 20 INJECTION, SOLUTION EPIDURAL; INFILTRATION; INTRACAUDAL; PERINEURAL at 10:01

## 2023-01-10 RX ADMIN — CEFAZOLIN SODIUM 2 G: 2 SOLUTION INTRAVENOUS at 10:01

## 2023-01-10 RX ADMIN — ONDANSETRON 4 MG: 2 INJECTION INTRAMUSCULAR; INTRAVENOUS at 11:01

## 2023-01-10 RX ADMIN — KETOROLAC TROMETHAMINE 15 MG: 30 INJECTION, SOLUTION INTRAMUSCULAR; INTRAVENOUS at 11:01

## 2023-01-10 RX ADMIN — HYDROMORPHONE HYDROCHLORIDE 0.2 MG: 2 INJECTION, SOLUTION INTRAMUSCULAR; INTRAVENOUS; SUBCUTANEOUS at 11:01

## 2023-01-10 RX ADMIN — EPHEDRINE SULFATE 25 MG: 50 INJECTION INTRAVENOUS at 10:01

## 2023-01-10 RX ADMIN — SCOPALAMINE 1 PATCH: 1 PATCH, EXTENDED RELEASE TRANSDERMAL at 08:01

## 2023-01-10 RX ADMIN — DEXAMETHASONE SODIUM PHOSPHATE 4 MG: 4 INJECTION, SOLUTION INTRAMUSCULAR; INTRAVENOUS at 10:01

## 2023-01-10 RX ADMIN — FENTANYL CITRATE 100 MCG: 50 INJECTION, SOLUTION INTRAMUSCULAR; INTRAVENOUS at 10:01

## 2023-01-10 RX ADMIN — SODIUM CHLORIDE, POTASSIUM CHLORIDE, SODIUM LACTATE AND CALCIUM CHLORIDE: 600; 310; 30; 20 INJECTION, SOLUTION INTRAVENOUS at 09:01

## 2023-01-10 NOTE — DISCHARGE SUMMARY
[facility] Surgery  Discharge Note  Short Stay    Procedure(s) (LRB):  ARTHROSCOPY, KNEE (Left)  REPAIR, MENISCUS, KNEE (Left)      OUTCOME: Patient tolerated treatment/procedure well without complication and is now ready for discharge.    DISPOSITION: Home or Self Care    FINAL DIAGNOSIS:  <principal problem not specified>    FOLLOWUP: In clinic    DISCHARGE INSTRUCTIONS:    Discharge Procedure Orders   Diet general     Activity as tolerated     Sponge bath only until clinic visit     Ice to affected area     Weight bearing restrictions (specify)     Remove dressing in 72 hours     Call MD for:  temperature >100.4     Call MD for:  persistent nausea and vomiting     Call MD for:  severe uncontrolled pain     Call MD for:  difficulty breathing, headache or visual disturbances     Call MD for:  redness, tenderness, or signs of infection (pain, swelling, redness, odor or green/yellow discharge around incision site)     Call MD for:  hives     Call MD for:  persistent dizziness or light-headedness     Call MD for:  extreme fatigue        TIME SPENT ON DISCHARGE: 15 minutes

## 2023-01-10 NOTE — TRANSFER OF CARE
"Anesthesia Transfer of Care Note    Patient: Dawit Carrion    Procedure(s) Performed: Procedure(s) (LRB):  ARTHROSCOPY, KNEE (Left)  REPAIR, MENISCUS, KNEE (Left)    Patient location: PACU    Anesthesia Type: general    Transport from OR: Transported from OR on room air with adequate spontaneous ventilation    Post pain: adequate analgesia    Post assessment: no apparent anesthetic complications    Post vital signs: stable    Level of consciousness: sedated and responds to stimulation    Nausea/Vomiting: no nausea/vomiting    Complications: none    Transfer of care protocol was followed      Last vitals:   Visit Vitals  /72   Pulse 95   Temp 36.4 °C (97.6 °F)   Resp 15   Ht 5' 5" (1.651 m)   Wt 66.2 kg (146 lb)   SpO2 99%   BMI 24.30 kg/m²     "

## 2023-01-10 NOTE — OP NOTE
[facility] - Surgery  Brief Operative Note     SUMMARY     Surgery Date: 1/10/2023     Surgeon(s) and Role:      * Jh Sky MD - Primary    First Assisstant: ramakrishna    Pre-op Diagnosis:  Acute meniscal tear of left knee, initial encounter [L53.213A]    Post-op Diagnosis:  Lateral meniscal tear,  Osteoarthritis, synovitis.    Procedure: Procedure(s):  ARTHROSCOPY, KNEE  REPAIR, MENISCUS, KNEE  With partial lateral meniscectomy, chondroplasty, synovectomy.    Anesthesia: General    Implants:* No implants in log *    Estimated Blood Loss: * No values recorded between 1/10/2023 10:21 AM and 1/10/2023 10:51 AM *         Specimens:   Specimen (24h ago, onward)      None            Description of Procedure:  After informed consent was obtained correctly identifying the patient she was taken to the operating room and after adequate anesthesia was prepped and draped in usual standard fashion with the operative leg and a thigh chavez nonoperative leg comfortably position and gyn Denton stirrup.  Diagnostic arthroscopy was carried out there standard anteromedial and anterolateral portals.  Examination of the medial compartment was consistent with some grade 1 maybe some early grade 2 changes over portion of the medial femoral condyle this was lightly debrided with a motorized shaver.  Medial meniscus was intact.  Notch was evaluated consistent with intact anterior cruciate and posterior cruciate ligaments.  Lateral compartment was visualized with the knee in a figure 4 position revealed radial flap tearing as well as horizontal cleavage tearing of the large portion of the body and posterior horn of the medial correction lateral meniscus this was resected with a motorized shaver straight baskets back to a smooth stable cartilage possible.  The large area of grade 3 and even some grade 4 areas over the lateral femoral condyle with loose cartilaginous flaps were debrided with a motorized shaver.  This was a bit too  diffuse area in my opinion to consider microfracture or drilling.  There was a good bit of synovitis in the notch as well as the lateral gutter suprapatellar pouch this was resected with a motorized shaver as best as possible.  Patellofemoral joint was consistent with some grade 3 and small area of grade 4 over the central portion of the patella some grade 3 changes in the trochlea.  This was debrided once again with a motorized shaver to remove loose cartilaginous flaps.  The knee was then irrigated with several 100 cc of fluids.  The scope instrumentation was removed.  Scope portals were closed 4-0 nylon sutures this was followed by Xeroform dressing sponges Webril and a lightly compressive Ace wrap.  Patient tolerated the procedure well was taken to the recovery room in stable condition with brisk capillary refill of her digits.

## 2023-01-10 NOTE — PROGRESS NOTES
Pt awake and alert upon discharge, pt holding down PO fluids. Instructions for care and follow up reviewed with pt and her spouse.

## 2023-01-11 NOTE — ANESTHESIA POSTPROCEDURE EVALUATION
Anesthesia Post Evaluation    Patient: Dawit Carrion    Procedure(s) Performed: Procedure(s) (LRB):  ARTHROSCOPY, KNEE (Left)  REPAIR, MENISCUS, KNEE (Left)    Final Anesthesia Type: general      Patient location during evaluation: PACU  Patient participation: Yes- Able to Participate  Level of consciousness: awake and alert  Post-procedure vital signs: reviewed and stable  Pain management: adequate  Airway patency: patent    PONV status at discharge: No PONV  Anesthetic complications: no      Cardiovascular status: blood pressure returned to baseline  Respiratory status: unassisted  Hydration status: euvolemic  Follow-up not needed.          Vitals Value Taken Time   /70 01/10/23 1233   Temp 36.4 °C (97.5 °F) 01/10/23 1057   Pulse 91 01/10/23 1230   Resp 19 01/10/23 1230   SpO2 96 % 01/10/23 1230   Vitals shown include unvalidated device data.      Event Time   Out of Recovery 12:15:00         Pain/Jesus Score: Pain Rating Prior to Med Admin: 8 (1/10/2023 11:44 AM)  Jesus Score: 10 (1/10/2023 12:00 PM)  Modified Jesus Score: 20 (1/10/2023 12:30 PM)

## 2023-01-17 VITALS
TEMPERATURE: 98 F | WEIGHT: 146 LBS | SYSTOLIC BLOOD PRESSURE: 140 MMHG | RESPIRATION RATE: 18 BRPM | BODY MASS INDEX: 24.32 KG/M2 | HEART RATE: 90 BPM | HEIGHT: 65 IN | OXYGEN SATURATION: 96 % | DIASTOLIC BLOOD PRESSURE: 66 MMHG

## 2023-01-19 ENCOUNTER — OFFICE VISIT (OUTPATIENT)
Dept: ORTHOPEDICS | Facility: CLINIC | Age: 68
End: 2023-01-19
Payer: MEDICARE

## 2023-01-19 DIAGNOSIS — M25.562 LEFT KNEE PAIN, UNSPECIFIED CHRONICITY: Primary | ICD-10-CM

## 2023-01-19 PROCEDURE — 99999 PR PBB SHADOW E&M-EST. PATIENT-LVL III: CPT | Mod: PBBFAC,,, | Performed by: ORTHOPAEDIC SURGERY

## 2023-01-19 PROCEDURE — 99024 POSTOP FOLLOW-UP VISIT: CPT | Mod: POP,,, | Performed by: ORTHOPAEDIC SURGERY

## 2023-01-19 PROCEDURE — 99999 PR PBB SHADOW E&M-EST. PATIENT-LVL III: ICD-10-PCS | Mod: PBBFAC,,, | Performed by: ORTHOPAEDIC SURGERY

## 2023-01-19 PROCEDURE — 99213 OFFICE O/P EST LOW 20 MIN: CPT | Mod: PBBFAC,PN | Performed by: ORTHOPAEDIC SURGERY

## 2023-01-19 PROCEDURE — 99024 PR POST-OP FOLLOW-UP VISIT: ICD-10-PCS | Mod: POP,,, | Performed by: ORTHOPAEDIC SURGERY

## 2023-01-19 NOTE — PROGRESS NOTES
Subjective:      Patient ID: Dawit Carrion is a 67 y.o. female.    Chief Complaint: Post-op Evaluation of the Left Knee    HPI  Patient follow up status post arthroscopic evaluation of her left knee.  Complaining only of mild pain.  She is had to beginning any physical therapy.  ROS      Objective:    Ortho Exam     Patient comes in with a mildly antalgic gait portal sites are clean and dry  Without any erythema or drainage.  She has 1+ effusion.  Adequate range of motion lacking a few degrees of flexion    X-Ray Knee 3 View Left  Narrative: EXAMINATION:  XR KNEE 3 VIEW LEFT    CLINICAL HISTORY:  Pain in left knee    TECHNIQUE:  AP, lateral, and Merchant views of the left knee were performed.    COMPARISON:  07/11/2013.    FINDINGS:  Mild tricompartmental degenerative osteoarthrosis with small osteophyte formation and mild narrowing of the medial compartment.    Patellofemoral articulation intact.  There is a moderate suprapatellar effusion.  Impression: Mild tricompartmental degenerative osteoarthrosis with a moderate suprapatellar effusion.    Electronically signed by: Waldo Marshall  Date:    12/19/2022  Time:    09:48       My Findings:    No new radiographs today.  Assessment:       No diagnosis found.      Plan:       Doing well postop.  We removed her sutures today.  We will get him into a rehab program we have discussed ice elevation compressive wrapping and NSAIDs for her discomfort.  Follow up with me in 4-6 weeks for any residual symptoms.  I have explained that with the degenerative nature of her knee she is likely to have some ongoing symptomatology including intermittent pain swelling stiffness and catching sensations.  She understands that long-term if symptoms persist she is likely candidate for total knee replacement.        Past Medical History:   Diagnosis Date    Atypical ductal hyperplasia of left breast 01/2020    Cataract     Glaucoma     Hyperlipidemia      Past Surgical History:    Procedure Laterality Date    ARTHROSCOPY OF KNEE Left 1/10/2023    Procedure: ARTHROSCOPY, KNEE;  Surgeon: Jh Sky MD;  Location: [facility] OR;  Service: Orthopedics;  Laterality: Left;    BREAST BIOPSY Left 01/22/2020    Procedure: BIOPSY, BREAST-LEFT SEED placement 1/13/20;  Surgeon: Raj Faith MD;  Location: [address];  Service: General;  Laterality: Left;    CHOLECYSTECTOMY  2004    HIP REPLACEMENT ARTHROPLASTY Right     HYSTERECTOMY  2004    REPAIR OF MENISCUS OF KNEE Left 1/10/2023    Procedure: REPAIR, MENISCUS, KNEE;  Surgeon: Jh Sky MD;  Location: [facility] OR;  Service: Orthopedics;  Laterality: Left;    TUBAL LIGATION  1991         Current Outpatient Medications:     amoxicillin (AMOXIL) 500 MG capsule, Take 500 mg by mouth once daily., Disp: , Rfl:     b complex vitamins capsule, , Disp: , Rfl:     buPROPion (WELLBUTRIN XL) 300 MG 24 hr tablet, Take 300 mg by mouth once daily., Disp: , Rfl:     cetirizine (ZYRTEC) 10 MG tablet, Take 10 mg by mouth., Disp: , Rfl:     cholecalciferol, vitamin D3, (VITAMIN D3 ORAL), Take 1,000 mg by mouth once daily., Disp: , Rfl:     desonide (DESOWEN) 0.05 % cream, Apply to affected areas twice a day for 2-3 weeks at a time during flares, Disp: , Rfl:     HYDROcodone-acetaminophen (NORCO) 5-325 mg per tablet, Take 1 tablet by mouth every 6 (six) hours as needed for Pain. (Patient not taking: Reported on 1/19/2023), Disp: 20 tablet, Rfl: 0    latanoprost 0.005 % ophthalmic solution, Place 1 drop into both eyes every evening., Disp: 2.5 mL, Rfl: 12    MULTIVITAMIN ORAL, Take by mouth once daily., Disp: , Rfl:     progesterone (PROMETRIUM) 100 MG capsule, Take 100 mg by mouth every evening. , Disp: , Rfl:     testosterone cypionate (DEPOTESTOTERONE CYPIONATE) 100 mg/mL injection, Inject into the muscle every 14 (fourteen) days., Disp: , Rfl:     testosterone cypionate (DEPOTESTOTERONE CYPIONATE) 100 mg/mL injection, , Disp: , Rfl:   No  current facility-administered medications for this visit.    Facility-Administered Medications Ordered in Other Visits:     0.9%  NaCl infusion, , Intravenous, Continuous, Brandy Martinez MD, Stopped at 01/22/20 1412    lidocaine (PF) 10 mg/ml (1%) injection 10 mg, 1 mL, Intradermal, Once, Brandy Martinez MD    ondansetron injection 4 mg, 4 mg, Intravenous, Q12H PRN, Brandy Martinez MD    promethazine (PHENERGAN) 6.25 mg in dextrose 5 % 50 mL IVPB, 6.25 mg, Intravenous, Q6H PRN, Brandy Martinez MD    Review of patient's allergies indicates:   Allergen Reactions    Codeine Shortness Of Breath    Flagyl [metronidazole hcl] Itching    Sulfa (sulfonamide antibiotics) Other (See Comments)       Family History   Problem Relation Age of Onset    Cancer Mother     Heart disease Father     Cancer Sister     Glaucoma Brother      Social History     Occupational History    Not on file   Tobacco Use    Smoking status: Former    Smokeless tobacco: Never    Tobacco comments:     quit 27 years ago   Substance and Sexual Activity    Alcohol use: No    Drug use: No    Sexual activity: Not on file

## 2023-02-17 ENCOUNTER — TELEPHONE (OUTPATIENT)
Dept: ORTHOPEDICS | Facility: CLINIC | Age: 68
End: 2023-02-17
Payer: MEDICARE

## 2023-02-17 NOTE — TELEPHONE ENCOUNTER
----- Message from Amina Oswaldo sent at 2/17/2023  3:24 PM CST -----  Name of Who is Calling:MALIK SARKAR [[id number]], [facility]        What is the request in detail:  confirming plan of care for physical therapy       Can the clinic reply by [facility]:no       What Number to Call Back if not in [facility]: [phone]

## 2023-03-01 ENCOUNTER — TELEPHONE (OUTPATIENT)
Dept: ORTHOPEDICS | Facility: CLINIC | Age: 68
End: 2023-03-01
Payer: MEDICARE

## 2023-03-01 NOTE — TELEPHONE ENCOUNTER
"----- Message from Mimi Yadav sent at 2/24/2023 11:38 AM CST -----  Regarding: FAX  "Type:  Patient Call Back    Who Called:[facility])    What is the reqeust in detail:Requesting call back following up on fax never received. Please advise    Can the clinic reply by [facility]?no    Best Call Back Number:[phone]      Additional Information:fax number [phone]            "

## 2023-03-02 ENCOUNTER — OFFICE VISIT (OUTPATIENT)
Dept: ORTHOPEDICS | Facility: CLINIC | Age: 68
End: 2023-03-02
Payer: MEDICARE

## 2023-03-02 DIAGNOSIS — M25.562 CHRONIC PAIN OF LEFT KNEE: Primary | ICD-10-CM

## 2023-03-02 DIAGNOSIS — G89.29 CHRONIC PAIN OF LEFT KNEE: Primary | ICD-10-CM

## 2023-03-02 PROCEDURE — 99024 POSTOP FOLLOW-UP VISIT: CPT | Mod: POP,,, | Performed by: ORTHOPAEDIC SURGERY

## 2023-03-02 PROCEDURE — 99999 PR PBB SHADOW E&M-EST. PATIENT-LVL III: ICD-10-PCS | Mod: PBBFAC,,, | Performed by: ORTHOPAEDIC SURGERY

## 2023-03-02 PROCEDURE — 99999 PR PBB SHADOW E&M-EST. PATIENT-LVL III: CPT | Mod: PBBFAC,,, | Performed by: ORTHOPAEDIC SURGERY

## 2023-03-02 PROCEDURE — 99024 PR POST-OP FOLLOW-UP VISIT: ICD-10-PCS | Mod: POP,,, | Performed by: ORTHOPAEDIC SURGERY

## 2023-03-02 PROCEDURE — 99213 OFFICE O/P EST LOW 20 MIN: CPT | Mod: PBBFAC,PN | Performed by: ORTHOPAEDIC SURGERY

## 2023-03-02 NOTE — PROGRESS NOTES
Subjective:      Patient ID: Dawit Carrion is a 67 y.o. female.    Chief Complaint: Post-op Evaluation of the Left Knee    HPI  Patient follow up status post arthroscopic meniscectomy and chondroplasty of her left knee.  Pain is much improved she is very satisfied with the outcome.  She still has a few outpatient therapy sessions  ROS      Objective:    Ortho Exam     Patient has a steady unassisted gait  She has excellent active knee range of motion no effusion minimal tenderness over the portal sites  She still has a mild degree of patellofemoral crepitus    X-Ray Knee 3 View Left  Narrative: EXAMINATION:  XR KNEE 3 VIEW LEFT    CLINICAL HISTORY:  Pain in left knee    TECHNIQUE:  AP, lateral, and Merchant views of the left knee were performed.    COMPARISON:  07/11/2013.    FINDINGS:  Mild tricompartmental degenerative osteoarthrosis with small osteophyte formation and mild narrowing of the medial compartment.    Patellofemoral articulation intact.  There is a moderate suprapatellar effusion.  Impression: Mild tricompartmental degenerative osteoarthrosis with a moderate suprapatellar effusion.    Electronically signed by: Waldo Marshall  Date:    12/19/2022  Time:    09:48       My Findings:    No new radiographs today.  Assessment:       Encounter Diagnosis   Name Primary?    Chronic pain of left knee Yes         Plan:       I have discussed medical condition and treatment options with her at length.  She can transition to outpatient therapy she may advance activities as tolerated follow up at this point can be as needed.  I have explained that with the degenerative nature of her knee she may intermittently have some pain that we can help with the necessary.        Past Medical History:   Diagnosis Date    Atypical ductal hyperplasia of left breast 01/2020    Cataract     Glaucoma     Hyperlipidemia      Past Surgical History:   Procedure Laterality Date    ARTHROSCOPY OF KNEE Left 1/10/2023    Procedure:  ARTHROSCOPY, KNEE;  Surgeon: Jh Sky MD;  Location: [facility] OR;  Service: Orthopedics;  Laterality: Left;    BREAST BIOPSY Left 01/22/2020    Procedure: BIOPSY, BREAST-LEFT SEED placement 1/13/20;  Surgeon: Raj Faith MD;  Location: [address];  Service: General;  Laterality: Left;    CHOLECYSTECTOMY  2004    HIP REPLACEMENT ARTHROPLASTY Right     HYSTERECTOMY  2004    REPAIR OF MENISCUS OF KNEE Left 1/10/2023    Procedure: REPAIR, MENISCUS, KNEE;  Surgeon: Jh Sky MD;  Location: [facility] OR;  Service: Orthopedics;  Laterality: Left;    TUBAL LIGATION  1991         Current Outpatient Medications:     amoxicillin (AMOXIL) 500 MG capsule, Take 500 mg by mouth once daily., Disp: , Rfl:     b complex vitamins capsule, , Disp: , Rfl:     buPROPion (WELLBUTRIN XL) 300 MG 24 hr tablet, Take 300 mg by mouth once daily., Disp: , Rfl:     cetirizine (ZYRTEC) 10 MG tablet, Take 10 mg by mouth., Disp: , Rfl:     cholecalciferol, vitamin D3, (VITAMIN D3 ORAL), Take 1,000 mg by mouth once daily., Disp: , Rfl:     desonide (DESOWEN) 0.05 % cream, Apply to affected areas twice a day for 2-3 weeks at a time during flares, Disp: , Rfl:     HYDROcodone-acetaminophen (NORCO) 5-325 mg per tablet, Take 1 tablet by mouth every 6 (six) hours as needed for Pain. (Patient not taking: Reported on 1/19/2023), Disp: 20 tablet, Rfl: 0    latanoprost 0.005 % ophthalmic solution, Place 1 drop into both eyes every evening., Disp: 2.5 mL, Rfl: 12    MULTIVITAMIN ORAL, Take by mouth once daily., Disp: , Rfl:     progesterone (PROMETRIUM) 100 MG capsule, Take 100 mg by mouth every evening. , Disp: , Rfl:     testosterone cypionate (DEPOTESTOTERONE CYPIONATE) 100 mg/mL injection, Inject into the muscle every 14 (fourteen) days., Disp: , Rfl:     testosterone cypionate (DEPOTESTOTERONE CYPIONATE) 100 mg/mL injection, , Disp: , Rfl:   No current facility-administered medications for this visit.    Facility-Administered  Medications Ordered in Other Visits:     0.9%  NaCl infusion, , Intravenous, Continuous, Brandy Martinez MD, Stopped at 01/22/20 1412    lidocaine (PF) 10 mg/ml (1%) injection 10 mg, 1 mL, Intradermal, Once, Brandy Martinez MD    ondansetron injection 4 mg, 4 mg, Intravenous, Q12H PRN, Brandy Martinez MD    promethazine (PHENERGAN) 6.25 mg in dextrose 5 % 50 mL IVPB, 6.25 mg, Intravenous, Q6H PRN, Brandy Martinez MD    Review of patient's allergies indicates:   Allergen Reactions    Codeine Shortness Of Breath    Flagyl [metronidazole hcl] Itching    Sulfa (sulfonamide antibiotics) Other (See Comments)       Family History   Problem Relation Age of Onset    Cancer Mother     Heart disease Father     Cancer Sister     Glaucoma Brother      Social History     Occupational History    Not on file   Tobacco Use    Smoking status: Former    Smokeless tobacco: Never    Tobacco comments:     quit 27 years ago   Substance and Sexual Activity    Alcohol use: No    Drug use: No    Sexual activity: Not on file

## 2023-04-19 ENCOUNTER — TELEPHONE (OUTPATIENT)
Dept: HEMATOLOGY/ONCOLOGY | Facility: CLINIC | Age: 68
End: 2023-04-19
Payer: MEDICARE

## 2023-04-19 DIAGNOSIS — N60.92 ATYPICAL DUCTAL HYPERPLASIA OF LEFT BREAST: Primary | ICD-10-CM

## 2023-04-19 NOTE — TELEPHONE ENCOUNTER
"----- Message from Lise Perez sent at 4/19/2023  2:22 PM CDT -----  Consult/Advisory:       Name Of Caller: Self       Contact Preference?: [phone]       Provider Name: Devika       Does patient feel the need to be seen today? No       What is the nature of the call?: Pt requesting order for yearly MRI          Additional Notes:  "Thank you for all that you do for our patients"                                           "

## 2023-04-20 ENCOUNTER — DOCUMENTATION ONLY (OUTPATIENT)
Dept: ORTHOPEDICS | Facility: CLINIC | Age: 68
End: 2023-04-20
Payer: MEDICARE

## 2023-04-20 NOTE — PROGRESS NOTES
The patient's discharge summary from [facility] was reviewed and signed by Dr. Sky. The discharge summary was successfully faxed to [phone].    Batched to medical records.

## 2023-06-06 ENCOUNTER — TELEPHONE (OUTPATIENT)
Dept: HEMATOLOGY/ONCOLOGY | Facility: CLINIC | Age: 68
End: 2023-06-06
Payer: MEDICARE

## 2023-06-06 NOTE — TELEPHONE ENCOUNTER
----- Message from Tremontana Chevalier sent at 6/6/2023 12:55 PM CDT -----  Regarding: reschedule  Reschedule Existing Appointment    Current appt date: 06/08    Type of appt : EP f/u    Physician: Cassy Fortune    Reason for rescheduling: imaging dept rescheduled appt to 07/07, so pt needs to reschedule the visit with Cassy Grey or avail provider from 06/08 to 07/07 after provider visit - see appt notes:  Follow up with PENNY 6 months with PJ with an MRI prior--same day    Date/Time Preference: 07/07    Caller: self     Contact Preference: [phone]    Comments/notes:

## 2023-06-07 ENCOUNTER — TELEPHONE (OUTPATIENT)
Dept: HEMATOLOGY/ONCOLOGY | Facility: CLINIC | Age: 68
End: 2023-06-07
Payer: MEDICARE

## 2023-06-07 NOTE — TELEPHONE ENCOUNTER
----- Message from Ludivina Fortune NP sent at 6/6/2023  1:42 PM CDT -----  Yes! Or at 930 (but make an hour) in my TC spot.    ----- Message -----  From: Marc Collins  Sent: 6/6/2023   1:29 PM CDT  To: Ludivina Fortune NP    Hello ,    This pt is traveling from [place], is it any way you can see her either at noon or 1:30pm after her scan that day?   Please advise.            Thank you!!  Marc

## 2023-07-07 ENCOUNTER — HOSPITAL ENCOUNTER (OUTPATIENT)
Dept: RADIOLOGY | Facility: HOSPITAL | Age: 68
Discharge: HOME | End: 2023-07-07
Attending: NURSE PRACTITIONER
Payer: MEDICARE

## 2023-07-07 ENCOUNTER — OFFICE VISIT (OUTPATIENT)
Dept: HEMATOLOGY/ONCOLOGY | Facility: CLINIC | Age: 68
End: 2023-07-07
Payer: MEDICARE

## 2023-07-07 VITALS
SYSTOLIC BLOOD PRESSURE: 136 MMHG | RESPIRATION RATE: 18 BRPM | OXYGEN SATURATION: 18 % | WEIGHT: 148.81 LBS | HEART RATE: 91 BPM | BODY MASS INDEX: 24.79 KG/M2 | DIASTOLIC BLOOD PRESSURE: 63 MMHG | HEIGHT: 65 IN | TEMPERATURE: 98 F

## 2023-07-07 DIAGNOSIS — N60.92 ATYPICAL DUCTAL HYPERPLASIA OF LEFT BREAST: Primary | ICD-10-CM

## 2023-07-07 DIAGNOSIS — N60.92 ATYPICAL DUCTAL HYPERPLASIA OF LEFT BREAST: ICD-10-CM

## 2023-07-07 DIAGNOSIS — F40.240 CLAUSTROPHOBIA: ICD-10-CM

## 2023-07-07 PROCEDURE — 77049 MRI BREAST C-+ W/CAD BI: CPT | Mod: TC

## 2023-07-07 PROCEDURE — 99214 PR OFFICE/OUTPT VISIT, EST, LEVL IV, 30-39 MIN: ICD-10-PCS | Mod: S$PBB,,, | Performed by: NURSE PRACTITIONER

## 2023-07-07 PROCEDURE — 77049 MRI BREAST C-+ W/CAD BI: CPT | Mod: 26,,, | Performed by: RADIOLOGY

## 2023-07-07 PROCEDURE — 25500020 PHARM REV CODE 255: Performed by: NURSE PRACTITIONER

## 2023-07-07 PROCEDURE — 99214 OFFICE O/P EST MOD 30 MIN: CPT | Mod: S$PBB,,, | Performed by: NURSE PRACTITIONER

## 2023-07-07 PROCEDURE — 77049 MRI BREAST W/WO CONTRAST, W/CAD, BILATERAL: ICD-10-PCS | Mod: 26,,, | Performed by: RADIOLOGY

## 2023-07-07 PROCEDURE — 99213 OFFICE O/P EST LOW 20 MIN: CPT | Mod: PBBFAC,25 | Performed by: NURSE PRACTITIONER

## 2023-07-07 PROCEDURE — 99999 PR PBB SHADOW E&M-EST. PATIENT-LVL III: CPT | Mod: PBBFAC,,, | Performed by: NURSE PRACTITIONER

## 2023-07-07 PROCEDURE — 99999 PR PBB SHADOW E&M-EST. PATIENT-LVL III: ICD-10-PCS | Mod: PBBFAC,,, | Performed by: NURSE PRACTITIONER

## 2023-07-07 PROCEDURE — A9577 INJ MULTIHANCE: HCPCS | Performed by: NURSE PRACTITIONER

## 2023-07-07 RX ADMIN — GADOBENATE DIMEGLUMINE 14 ML: 529 INJECTION, SOLUTION INTRAVENOUS at 11:07

## 2023-07-07 NOTE — Clinical Note
Hi there! I have a patient with h/o ADH and had a hard time with her MRI today due to severe anxiety. What are your thoughts on switching her to an annual REYNOLD?

## 2023-07-07 NOTE — PROGRESS NOTES
Subjective:       Patient ID: Dawit Carrion is a 68 y.o. female.    Chief Complaint: Atypical ductal hyperplasia of left breast      HPI   68-year-old female here for follow up for diagnosis of atypical ductal hyperplasia. Opted out of chemo prevention.     Knee surgery in the interval. No more pain    She had an MRI breast today and had a hard time due to claustrophobia.   Otherwise, feels well.   No breast concerns.   No pain issues  Appetite good and weight stable.   No other complaints or issues.   Drives from [place] (3 hours away).       MRI breast today- pending       12/8/2022 MMG:  Impression:  Bilateral  There is no mammographic evidence of malignancy.     BI-RADS Category:   Overall: 2 - Benign     Recommendation:  Routine screening mammogram in 1 year is recommended.        Your estimated lifetime risk of breast cancer (to age 85) based on Tyrer-Cuzick risk assessment model is Tyrer-Cuzick: 17.33 %. According to the American Cancer Society, patients with a lifetime breast cancer risk of 20% or higher might benefit from supplemental screening tests.         ADH History: per Dr. Stoner's note:  She underwent screening mammogram on November 15, 2019 which showed some calcifications in the upper outer portion of the left breast.      Follow-up diagnostic mammogram on November 22, 2019 showed amorphous calcifications the upper outer quadrant left breast at 1:00 a.m..    On December 10, 2019 a needle biopsy was performed which showed atypical ductal hyperplasia with calcifications.      Follow-up excisional biopsy was performed on January 22, 2020 which again showed atypical ductal hyperplasia     She reports significant menopausal symptoms and the use of supplemental estrogen and progesterone since her hysterectomy.    Opts out of chemo prevention. Risk factor modifications discussed. Recommended continued exercise and healthy diet.       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    Objective:      Physical Exam  Vitals reviewed.   Constitutional:       General: She is not in acute distress.     Appearance: She is well-developed.   HENT:      Mouth/Throat:      Pharynx: No oropharyngeal exudate.   Eyes:      General: No scleral icterus.  Cardiovascular:      Rate and Rhythm: Normal rate and regular rhythm.   Pulmonary:      Effort: Pulmonary effort is normal.      Breath sounds: Normal breath sounds. No wheezing or rales.   Chest:   Breasts:     Right: No mass, nipple discharge or skin change.      Left: No mass, nipple discharge or skin change.          Comments: Left breast scar healed with underlying thickness.   Abdominal:      Palpations: Abdomen is soft. There is no mass.      Tenderness: There is no abdominal tenderness.   Lymphadenopathy:      Cervical: No cervical adenopathy.      Upper Body:      Right upper body: No axillary adenopathy.      Left upper body: No axillary adenopathy.   Neurological:      Mental Status: She is alert and oriented to person, place, and time.   Psychiatric:         Behavior: Behavior normal.         Thought Content: Thought content normal.            Assessment:       1. Atypical ductal hyperplasia of left breast    2. Claustrophobia          Plan:         Doing well, clinically negative.    MRI breast today- pending results  Will discuss with Dr. Jaquez switching her to annual REYNOLD as with severe anxiety/claustrophobia with MRI today.   Opts to some every 6  months for clinical breast exam.   Encourage breast awareness  Continue to follow up with PCP for other health maintenance and all other established providers.   Reassurance given      Route Chart for Scheduling    Med Onc Chart Routing      Follow up with physician    Follow up with PENNY 6 months. with contrast enhanced mammogram on same day in AM (drives from [place]).   Infusion scheduling note    Injection scheduling note    Labs    Imaging    Pharmacy appointment    Other referrals                 Patient is in agreement with the proposed treatment plan. All questions were answered to the patient's satisfaction. Pt knows to call clinic for any new or worsening symptoms and if anything is needed before the next clinic visit.      Ludivina Fortune, SHANDAP-C  Hematology & Oncology  [address] [place] [address]  ph. [phone]  Fax. [phone]       30 minutes of total time spent on the encounter, which includes face to face time and non-face to face time preparing to see the patient (eg, review of tests), Obtaining and/or reviewing separately obtained history, Documenting clinical information in the electronic or other health record, Independently interpreting results (not separately reported) and communicating results to the patient/family/caregiver, or Care coordination (not separately reported).

## 2023-08-28 DIAGNOSIS — Z00.00 GENERAL MEDICAL EXAMINATION: Primary | ICD-10-CM

## 2023-08-28 DIAGNOSIS — R79.9 ABNORMAL FINDING OF BLOOD CHEMISTRY, UNSPECIFIED: ICD-10-CM

## 2023-08-29 ENCOUNTER — OFFICE VISIT (OUTPATIENT)
Dept: INTERNAL MEDICINE | Facility: CLINIC | Age: 68
End: 2023-08-29
Payer: MEDICARE

## 2023-08-29 ENCOUNTER — LAB VISIT (OUTPATIENT)
Dept: LAB | Facility: HOSPITAL | Age: 68
End: 2023-08-29
Attending: STUDENT IN AN ORGANIZED HEALTH CARE EDUCATION/TRAINING PROGRAM
Payer: MEDICARE

## 2023-08-29 VITALS
BODY MASS INDEX: 24.55 KG/M2 | HEART RATE: 82 BPM | HEIGHT: 65 IN | SYSTOLIC BLOOD PRESSURE: 139 MMHG | WEIGHT: 147.38 LBS | DIASTOLIC BLOOD PRESSURE: 78 MMHG

## 2023-08-29 DIAGNOSIS — R79.89 OTHER SPECIFIED ABNORMAL FINDINGS OF BLOOD CHEMISTRY: ICD-10-CM

## 2023-08-29 DIAGNOSIS — Z00.00 GENERAL MEDICAL EXAMINATION: ICD-10-CM

## 2023-08-29 DIAGNOSIS — Z00.00 HEALTHCARE MAINTENANCE: ICD-10-CM

## 2023-08-29 DIAGNOSIS — Z00.00 ROUTINE GENERAL MEDICAL EXAMINATION AT A HEALTH CARE FACILITY: Primary | ICD-10-CM

## 2023-08-29 DIAGNOSIS — Z78.0 ASYMPTOMATIC MENOPAUSAL STATE: ICD-10-CM

## 2023-08-29 DIAGNOSIS — R79.9 ABNORMAL FINDING OF BLOOD CHEMISTRY, UNSPECIFIED: ICD-10-CM

## 2023-08-29 DIAGNOSIS — Z00.00 GENERAL MEDICAL EXAMINATION: Primary | ICD-10-CM

## 2023-08-29 PROBLEM — H40.1131 PRIMARY OPEN ANGLE GLAUCOMA (POAG) OF BOTH EYES, MILD STAGE: Status: ACTIVE | Noted: 2020-07-28

## 2023-08-29 LAB
ALBUMIN SERPL BCP-MCNC: 4.1 G/DL (ref 3.5–5.2)
ALP SERPL-CCNC: 63 U/L (ref 55–135)
ALT SERPL W/O P-5'-P-CCNC: 15 U/L (ref 10–44)
ANION GAP SERPL CALC-SCNC: 7 MMOL/L (ref 8–16)
AST SERPL-CCNC: 38 U/L (ref 10–40)
BASOPHILS # BLD AUTO: 0.04 K/UL (ref 0–0.2)
BASOPHILS NFR BLD: 0.6 % (ref 0–1.9)
BILIRUB SERPL-MCNC: 0.4 MG/DL (ref 0.1–1)
BUN SERPL-MCNC: 9 MG/DL (ref 8–23)
CALCIUM SERPL-MCNC: 9.8 MG/DL (ref 8.7–10.5)
CHLORIDE SERPL-SCNC: 105 MMOL/L (ref 95–110)
CHOLEST SERPL-MCNC: 227 MG/DL (ref 120–199)
CHOLEST/HDLC SERPL: 3.9 {RATIO} (ref 2–5)
CO2 SERPL-SCNC: 27 MMOL/L (ref 23–29)
CREAT SERPL-MCNC: 0.9 MG/DL (ref 0.5–1.4)
DIFFERENTIAL METHOD: ABNORMAL
EOSINOPHIL # BLD AUTO: 0.1 K/UL (ref 0–0.5)
EOSINOPHIL NFR BLD: 1.8 % (ref 0–8)
ERYTHROCYTE [DISTWIDTH] IN BLOOD BY AUTOMATED COUNT: 12.2 % (ref 11.5–14.5)
EST. GFR  (NO RACE VARIABLE): >60 ML/MIN/1.73 M^2
ESTIMATED AVG GLUCOSE: 103 MG/DL (ref 68–131)
GLUCOSE SERPL-MCNC: 95 MG/DL (ref 70–110)
HBA1C MFR BLD: 5.2 % (ref 4–5.6)
HCT VFR BLD AUTO: 43.7 % (ref 37–48.5)
HCV AB SERPL QL IA: NORMAL
HDLC SERPL-MCNC: 58 MG/DL (ref 40–75)
HDLC SERPL: 25.6 % (ref 20–50)
HGB BLD-MCNC: 15.3 G/DL (ref 12–16)
IMM GRANULOCYTES # BLD AUTO: 0.01 K/UL (ref 0–0.04)
IMM GRANULOCYTES NFR BLD AUTO: 0.1 % (ref 0–0.5)
LDLC SERPL CALC-MCNC: 154.4 MG/DL (ref 63–159)
LYMPHOCYTES # BLD AUTO: 1.1 K/UL (ref 1–4.8)
LYMPHOCYTES NFR BLD: 16.3 % (ref 18–48)
MCH RBC QN AUTO: 33.6 PG (ref 27–31)
MCHC RBC AUTO-ENTMCNC: 35 G/DL (ref 32–36)
MCV RBC AUTO: 96 FL (ref 82–98)
MONOCYTES # BLD AUTO: 0.8 K/UL (ref 0.3–1)
MONOCYTES NFR BLD: 11.5 % (ref 4–15)
NEUTROPHILS # BLD AUTO: 4.7 K/UL (ref 1.8–7.7)
NEUTROPHILS NFR BLD: 69.7 % (ref 38–73)
NONHDLC SERPL-MCNC: 169 MG/DL
NRBC BLD-RTO: 0 /100 WBC
PLATELET # BLD AUTO: 285 K/UL (ref 150–450)
PMV BLD AUTO: 10.3 FL (ref 9.2–12.9)
POTASSIUM SERPL-SCNC: 4.9 MMOL/L (ref 3.5–5.1)
PROT SERPL-MCNC: 6.7 G/DL (ref 6–8.4)
RBC # BLD AUTO: 4.55 M/UL (ref 4–5.4)
SODIUM SERPL-SCNC: 139 MMOL/L (ref 136–145)
TRIGL SERPL-MCNC: 73 MG/DL (ref 30–150)
WBC # BLD AUTO: 6.68 K/UL (ref 3.9–12.7)

## 2023-08-29 PROCEDURE — 83036 HEMOGLOBIN GLYCOSYLATED A1C: CPT | Performed by: STUDENT IN AN ORGANIZED HEALTH CARE EDUCATION/TRAINING PROGRAM

## 2023-08-29 PROCEDURE — 99214 OFFICE O/P EST MOD 30 MIN: CPT | Mod: PBBFAC | Performed by: STUDENT IN AN ORGANIZED HEALTH CARE EDUCATION/TRAINING PROGRAM

## 2023-08-29 PROCEDURE — 99999 PR PBB SHADOW E&M-EST. PATIENT-LVL IV: CPT | Mod: PBBFAC,,, | Performed by: STUDENT IN AN ORGANIZED HEALTH CARE EDUCATION/TRAINING PROGRAM

## 2023-08-29 PROCEDURE — 80053 COMPREHEN METABOLIC PANEL: CPT | Performed by: STUDENT IN AN ORGANIZED HEALTH CARE EDUCATION/TRAINING PROGRAM

## 2023-08-29 PROCEDURE — 99387 INIT PM E/M NEW PAT 65+ YRS: CPT | Mod: GZ,S$PBB,, | Performed by: STUDENT IN AN ORGANIZED HEALTH CARE EDUCATION/TRAINING PROGRAM

## 2023-08-29 PROCEDURE — 36415 COLL VENOUS BLD VENIPUNCTURE: CPT | Performed by: STUDENT IN AN ORGANIZED HEALTH CARE EDUCATION/TRAINING PROGRAM

## 2023-08-29 PROCEDURE — 86803 HEPATITIS C AB TEST: CPT | Performed by: STUDENT IN AN ORGANIZED HEALTH CARE EDUCATION/TRAINING PROGRAM

## 2023-08-29 PROCEDURE — 80061 LIPID PANEL: CPT | Performed by: STUDENT IN AN ORGANIZED HEALTH CARE EDUCATION/TRAINING PROGRAM

## 2023-08-29 PROCEDURE — 85025 COMPLETE CBC W/AUTO DIFF WBC: CPT | Performed by: STUDENT IN AN ORGANIZED HEALTH CARE EDUCATION/TRAINING PROGRAM

## 2023-08-29 PROCEDURE — 99387 PR PREVENTIVE VISIT,NEW,65 & OVER: ICD-10-PCS | Mod: GZ,S$PBB,, | Performed by: STUDENT IN AN ORGANIZED HEALTH CARE EDUCATION/TRAINING PROGRAM

## 2023-08-29 PROCEDURE — 99999 PR PBB SHADOW E&M-EST. PATIENT-LVL IV: ICD-10-PCS | Mod: PBBFAC,,, | Performed by: STUDENT IN AN ORGANIZED HEALTH CARE EDUCATION/TRAINING PROGRAM

## 2023-08-29 NOTE — PATIENT INSTRUCTIONS
- Take your blood pressure at home, daily if you can at least once, if blood pressure numbers are higher than 140/90 consistently, let me know   - Try to keep a low-fat / low-cholesterol diet  - Schedule your colonoscopy  - Schedule your dexa scan  - Get vaccinated against tetanus and pneumonia

## 2023-08-29 NOTE — PROGRESS NOTES
Subjective:       Patient ID: Dawit Carrion is a 68 y.o. female.    Chief Complaint: Follow-up and Annual Exam (General checkup)    Follow-up        Dawit Carrion is a 68 y.o. female , English speaking, with a history of:  HLD  Open angle glaucoma  H/o atypical ductal hyperplasia of the left breast s/p lumpectomy  Menopause  On testosterone supplementation    [Local Patient]  Originally from [facility]  Lives in [place]: [place]    Patient comes to the clinic a general medical health examination and to establish care    Patient is currently asymptomatic and has no complaints.    She says she is fairly healthy, and her only treatment is testosterone supplementation for fatigue which is provided by local nurse practitioner where she resides.  She has been on this therapy for long time.    Patient denies CV symptoms, CP, SOB, palpitations.  Patient denies constitutional symptoms, fever, changes in the urine or stool.    Today's labs:  CBC WNL  CMP WNL  Lipid panel: , HDL 58, , TG 73  A1c: 5.2  TSH WNL 1.404  Hepatitis C Non-reactive    PMH:  Past Medical History:   Diagnosis Date    Atypical ductal hyperplasia of left breast 01/2020    Cataract     Glaucoma     Hyperlipidemia        PSH:  Past Surgical History:   Procedure Laterality Date    ARTHROSCOPY OF KNEE Left 01/10/2023    Procedure: ARTHROSCOPY, KNEE;  Surgeon: Jh Sky MD;  Location: [facility] OR;  Service: Orthopedics;  Laterality: Left;    BREAST BIOPSY Left 01/22/2020    Procedure: BIOPSY, BREAST-LEFT SEED placement 1/13/20;  Surgeon: Raj Faith MD;  Location: [address];  Service: General;  Laterality: Left;    CATARACT EXTRACTION      CHOLECYSTECTOMY  2004    HIP REPLACEMENT ARTHROPLASTY Right     HYSTERECTOMY  2004    REPAIR OF MENISCUS OF KNEE Left 01/10/2023    Procedure: REPAIR, MENISCUS, KNEE;  Surgeon: Jh Sky MD;  Location: [facility] OR;  Service: Orthopedics;  Laterality: Left;     SALPINGOOPHORECTOMY Bilateral 2004    TUBAL LIGATION  1991       FH:  Family History   Problem Relation Age of Onset    Cancer Mother     Coronary artery disease Mother     Heart failure Mother     Heart disease Father     Hypertension Father     Thyroid cancer Sister     Glaucoma Brother        Allergies: Negative  Review of patient's allergies indicates:   Allergen Reactions    Codeine Shortness Of Breath    Flagyl [metronidazole hcl] Itching    Sulfa (sulfonamide antibiotics) Other (See Comments)       Meds:    Current Outpatient Medications:     cholecalciferol, vitamin D3, (VITAMIN D3 ORAL), Take 1,000 mg by mouth once daily., Disp: , Rfl:     latanoprost 0.005 % ophthalmic solution, Place 1 drop into both eyes every evening., Disp: 2.5 mL, Rfl: 12    progesterone (PROMETRIUM) 100 MG capsule, Take 100 mg by mouth every evening. , Disp: , Rfl:     testosterone cypionate (DEPOTESTOTERONE CYPIONATE) 100 mg/mL injection, Inject into the muscle every 14 (fourteen) days., Disp: , Rfl:     diphth,pertus,acell,,tetanus (BOOSTRIX) 2.5-8-5 Lf-mcg-Lf/0.5mL Susp, Inject 0.5 mLs into the muscle once. for 1 dose, Disp: 0.5 mL, Rfl: 0  No current facility-administered medications for this visit.    Social:    Has 4 daughters: One is in [place], [place], [place] and [place]   is retired so she decided to go back to work after he retired  Does contract work for ,       Exercise: Physically active, walking  Diet: Regular with no restrictions  Tobacco: Former smoker of 2 ppd x 13 years from 1973 to 1986  Alcohol: Denies  Recreational drug use: Denies  Recent travel: Denies    Healthcare Maintenance:  Colonoscopy: 2004  Vaccinations: Pneumonia, Zoster, Tetanus (no)  COVID vaccination: completed  Depression screening: PHQ2 score = 0    Annual visit approx. Month: August ROS  11-point review of systems done. Negative except for detailed in the HPI.        Objective:      Vitals:    08/29/23 1054  "  BP: 139/78   Pulse: 82   Weight: 66.9 kg (147 lb 6.4 oz)   Height: 5' 5" (1.651 m)         Physical Exam  Vitals and nursing note reviewed.   Constitutional:       Appearance: Normal appearance.   HENT:      Head: Normocephalic and atraumatic.      Right Ear: Tympanic membrane normal.      Left Ear: Tympanic membrane normal.      Nose: Nose normal.      Mouth/Throat:      Mouth: Mucous membranes are moist.      Pharynx: Oropharynx is clear.   Eyes:      Extraocular Movements: Extraocular movements intact.      Conjunctiva/sclera: Conjunctivae normal.      Pupils: Pupils are equal, round, and reactive to light.   Cardiovascular:      Rate and Rhythm: Normal rate and regular rhythm.      Pulses: Normal pulses.      Heart sounds: Normal heart sounds.   Pulmonary:      Effort: Pulmonary effort is normal.      Breath sounds: Normal breath sounds.   Abdominal:      General: Bowel sounds are normal. There is no distension.      Palpations: Abdomen is soft.      Tenderness: There is no abdominal tenderness.   Musculoskeletal:         General: Normal range of motion.      Cervical back: Normal range of motion and neck supple.   Skin:     General: Skin is warm.   Neurological:      General: No focal deficit present.      Mental Status: She is alert and oriented to person, place, and time. Mental status is at baseline.   Psychiatric:         Mood and Affect: Mood normal.            Assessment:       1. Routine general medical examination at a health care facility  Assessment & Plan:  Patient is in overall good general health.  Stable medical conditions listed on the PMH.  Labs reviewed and patient notified.      Orders:  -     CBC Auto Differential; Future; Expected date: 08/18/2024  -     Comprehensive Metabolic Panel; Future; Expected date: 08/18/2024  -     Hemoglobin A1C; Future; Expected date: 08/18/2024  -     Lipid Panel; Future; Expected date: 08/18/2024    2. Asymptomatic menopausal state  -     DXA Bone Density " Axial Skeleton 1 or more sites; Future; Expected date: 08/29/2023    3. Other specified abnormal findings of blood chemistry  -     CBC Auto Differential; Future; Expected date: 08/18/2024  -     Hemoglobin A1C; Future; Expected date: 08/18/2024  -     Lipid Panel; Future; Expected date: 08/18/2024    4. Healthcare maintenance  Assessment & Plan:  Health care maintenance and core gaps reviewed and assessed with patient.  Vaccinations recommended:        Tetanus - O       Shingles - D       Influenza - N/A       Pneumonia - O  Colon cancer screening:   Colonoscopy: O  Lifestyle recommendations given.  Physical activity recommended.    Legend: Ordered (O), Declined (D), Current (C)      Orders:  -     Ambulatory referral/consult to Endo Procedure ; Future; Expected date: 08/30/2023  -     Pneumococcal Conjugate Vaccine (20 Valent) (IM)  -     diphth,pertus,acell,,tetanus (BOOSTRIX) 2.5-8-5 Lf-mcg-Lf/0.5mL Susp; Inject 0.5 mLs into the muscle once. for 1 dose  Dispense: 0.5 mL; Refill: 0       Plan:       Tdap, Prevnar 20 ordered  Low-fat diet recommended  Colonoscopy ordered  DEXA scan ordered    - Continue with annual wellness visits by PCP      Recommendations for patient:  - Take your blood pressure at home, daily if you can at least once, if blood pressure numbers are higher than 140/90 consistently, let me know   - Try to keep a low-fat / low-cholesterol diet  - Schedule your colonoscopy  - Schedule your dexa scan  - Get vaccinated against tetanus and pneumonia        Education provided  Lifestyle recommendations given  AVS generated, explained, and sent to the patient through the patient portal.  RTC in : 1 year for annual wellness visit, labs ordered          COLIN CALLAHAN MD, MPH  Internal Medicine  [employer] [facility]  [facility]

## 2023-08-29 NOTE — ASSESSMENT & PLAN NOTE
Health care maintenance and core gaps reviewed and assessed with patient.  Vaccinations recommended:        Tetanus - O       Shingles - D       Influenza - N/A       Pneumonia - O  Colon cancer screening:   Colonoscopy: O  Lifestyle recommendations given.  Physical activity recommended.    Legend: Ordered (O), Declined (D), Current (C)     Cartilage Graft Text: The defect edges were debeveled with a #15c scalpel blade.  Given the location of the defect, shape of the defect, the fact the defect involved a full thickness cartilage defect a cartilage graft was deemed most appropriate.  An appropriate donor site was identified, cleansed, and anesthetized. The cartilage graft was then harvested and transferred to the recipient site, oriented appropriately and then sutured into place.  The secondary defect was then repaired using a primary closure.

## 2023-10-09 ENCOUNTER — PATIENT MESSAGE (OUTPATIENT)
Dept: ADMINISTRATIVE | Facility: HOSPITAL | Age: 68
End: 2023-10-09
Payer: MEDICARE

## 2023-10-16 ENCOUNTER — TELEPHONE (OUTPATIENT)
Dept: HEMATOLOGY/ONCOLOGY | Facility: CLINIC | Age: 68
End: 2023-10-16
Payer: MEDICARE

## 2023-10-16 DIAGNOSIS — N60.92 ATYPICAL DUCTAL HYPERPLASIA OF LEFT BREAST: Primary | ICD-10-CM

## 2023-10-16 DIAGNOSIS — Z91.89 AT HIGH RISK FOR BREAST CANCER: ICD-10-CM

## 2023-10-16 NOTE — TELEPHONE ENCOUNTER
"----- Message from Marva Mak sent at 10/16/2023  3:42 PM CDT -----  Regarding: orders  Please place an order for a mammogram. Please advise   ----- Message -----  From: Lise Perez  Sent: 10/16/2023  11:47 AM CDT  To: [facility]  Pool    Scheduling Request       Patient Status: est       Scheduling Appt: f/u; mammo       Time/Date Preference: soonest available in [place]       Relationship to Patient?:       Contact Preference?: [phone]       Treating Provider: Devika       Do you feel you need to be seen today? No           Additional Notes:  "Thank you for all that you do for our patients"               "

## 2023-10-20 ENCOUNTER — CLINICAL SUPPORT (OUTPATIENT)
Dept: ENDOSCOPY | Facility: HOSPITAL | Age: 68
End: 2023-10-20
Attending: STUDENT IN AN ORGANIZED HEALTH CARE EDUCATION/TRAINING PROGRAM
Payer: MEDICARE

## 2023-10-20 ENCOUNTER — TELEPHONE (OUTPATIENT)
Dept: ENDOSCOPY | Facility: HOSPITAL | Age: 68
End: 2023-10-20
Payer: MEDICARE

## 2023-10-20 DIAGNOSIS — Z00.00 HEALTHCARE MAINTENANCE: ICD-10-CM

## 2023-10-20 NOTE — PLAN OF CARE
Contacted the patient to schedule an endoscopy procedure(s) colonoscopy. Spoke with patient. Patient informed no availability to schedule colonoscopy at this time. Patient request another PAT appointment to be called back. Patient scheduled for follow up PAT. Patient also given phone number to the endoscopy scheduling department. Patient verbalizes understanding.

## 2023-11-02 ENCOUNTER — TELEPHONE (OUTPATIENT)
Dept: ENDOSCOPY | Facility: HOSPITAL | Age: 68
End: 2023-11-02
Payer: MEDICARE

## 2023-11-02 DIAGNOSIS — Z00.00 HEALTHCARE MAINTENANCE: Primary | ICD-10-CM

## 2023-11-02 DIAGNOSIS — Z12.11 SPECIAL SCREENING FOR MALIGNANT NEOPLASMS, COLON: ICD-10-CM

## 2023-11-02 RX ORDER — POLYETHYLENE GLYCOL 3350, SODIUM SULFATE ANHYDROUS, SODIUM BICARBONATE, SODIUM CHLORIDE, POTASSIUM CHLORIDE 236; 22.74; 6.74; 5.86; 2.97 G/4L; G/4L; G/4L; G/4L; G/4L
4 POWDER, FOR SOLUTION ORAL ONCE
Qty: 4000 ML | Refills: 0 | Status: SHIPPED | OUTPATIENT
Start: 2023-11-02 | End: 2023-11-02

## 2023-11-02 NOTE — TELEPHONE ENCOUNTER
Spoke to patient to schedule procedure(s) Colonoscopy       Physician to perform procedure(s) Dr. CAMACHO Weir  Date of Procedure (s) 1/16/24  Arrival Time 6:40 AM  Time of Procedure(s) 7:40 AM   Location of Procedure(s) [place] 4[address] Floor  Type of Rx Prep sent to patient: PEG  Instructions provided to patient via [facility]    Patient was informed on the following information and verbalized understanding. Screening questionnaire reviewed with patient and complete. If procedure requires anesthesia, a responsible adult needs to be present to accompany the patient home, patient cannot drive after receiving anesthesia. Appointment details are tentative, especially check-in time. Patient will receive a prep-op call 4 days prior to confirm check-in time for procedure. If applicable the patient should contact their pharmacy to verify Rx for procedure prep is ready for pick-up. Patient was advised to call the scheduling department at [phone] if pharmacy states no Rx is available. Patient was advised to call the endoscopy scheduling department if any questions or concerns arise.      [facility] Endoscopy Scheduling Department

## 2023-12-01 ENCOUNTER — OFFICE VISIT (OUTPATIENT)
Dept: HEMATOLOGY/ONCOLOGY | Facility: CLINIC | Age: 68
End: 2023-12-01
Payer: MEDICARE

## 2023-12-01 ENCOUNTER — HOSPITAL ENCOUNTER (OUTPATIENT)
Dept: RADIOLOGY | Facility: HOSPITAL | Age: 68
Discharge: HOME | End: 2023-12-01
Attending: NURSE PRACTITIONER
Payer: MEDICARE

## 2023-12-01 VITALS
TEMPERATURE: 98 F | SYSTOLIC BLOOD PRESSURE: 120 MMHG | RESPIRATION RATE: 18 BRPM | HEIGHT: 65 IN | WEIGHT: 148.13 LBS | BODY MASS INDEX: 24.68 KG/M2 | HEART RATE: 103 BPM | OXYGEN SATURATION: 98 % | DIASTOLIC BLOOD PRESSURE: 72 MMHG

## 2023-12-01 DIAGNOSIS — Z91.89 AT HIGH RISK FOR BREAST CANCER: ICD-10-CM

## 2023-12-01 DIAGNOSIS — D22.9 ATYPICAL MOLE: ICD-10-CM

## 2023-12-01 DIAGNOSIS — N60.92 ATYPICAL DUCTAL HYPERPLASIA OF LEFT BREAST: ICD-10-CM

## 2023-12-01 DIAGNOSIS — N60.92 ATYPICAL DUCTAL HYPERPLASIA OF LEFT BREAST: Primary | ICD-10-CM

## 2023-12-01 DIAGNOSIS — Z80.3 FAMILY HISTORY OF BREAST CANCER: ICD-10-CM

## 2023-12-01 PROCEDURE — 99214 OFFICE O/P EST MOD 30 MIN: CPT | Mod: PBBFAC | Performed by: NURSE PRACTITIONER

## 2023-12-01 PROCEDURE — 77066 DX MAMMO INCL CAD BI: CPT | Mod: 26,,, | Performed by: RADIOLOGY

## 2023-12-01 PROCEDURE — 77062 MAMMO DIGITAL DIAGNOSTIC BILAT WITH TOMO: ICD-10-PCS | Mod: 26,,, | Performed by: RADIOLOGY

## 2023-12-01 PROCEDURE — 99214 OFFICE O/P EST MOD 30 MIN: CPT | Mod: S$PBB,,, | Performed by: NURSE PRACTITIONER

## 2023-12-01 PROCEDURE — 77066 MAMMO DIGITAL DIAGNOSTIC BILAT WITH TOMO: ICD-10-PCS | Mod: 26,,, | Performed by: RADIOLOGY

## 2023-12-01 PROCEDURE — 99999 PR PBB SHADOW E&M-EST. PATIENT-LVL IV: CPT | Mod: PBBFAC,,, | Performed by: NURSE PRACTITIONER

## 2023-12-01 PROCEDURE — 77062 BREAST TOMOSYNTHESIS BI: CPT | Mod: 26,,, | Performed by: RADIOLOGY

## 2023-12-01 PROCEDURE — 77066 DX MAMMO INCL CAD BI: CPT | Mod: TC

## 2023-12-01 PROCEDURE — 99214 PR OFFICE/OUTPT VISIT, EST, LEVL IV, 30-39 MIN: ICD-10-PCS | Mod: S$PBB,,, | Performed by: NURSE PRACTITIONER

## 2023-12-01 PROCEDURE — 99999 PR PBB SHADOW E&M-EST. PATIENT-LVL IV: ICD-10-PCS | Mod: PBBFAC,,, | Performed by: NURSE PRACTITIONER

## 2023-12-01 NOTE — PROGRESS NOTES
Subjective:       Patient ID: aDwit Carrion is a 68 y.o. female.    Chief Complaint: Atypical ductal hyperplasia of left breast      HPI   68-year-old female here for follow up for diagnosis of atypical ductal hyperplasia. Opted out of chemoprevention.     Overall doing well  No breast concerns.   No further knee pain.   Ambulating well.   Reports lesion to calf that flakes and then returns.   Appetite good and weight stable.   No other complaints or issues.   Drives from [place] (3 hours away).              12/1/2023 MMG:   Impression:  Bilateral  There is no mammographic evidence of malignancy.  BI-RADS Category:   Overall: 2 - Benign  Recommendation:  Routine screening mammogram in 1 year is recommended.     7/7/2023 MRI breast:   Impression:  Bilateral  There is no MR evidence of malignancy.  BI-RADS Category:   Overall: 2 - Benign  Recommendation:  Annual screening mammogram is due in December.          ADH History: per Dr. Stoner's note:  She underwent screening mammogram on November 15, 2019 which showed some calcifications in the upper outer portion of the left breast.      Follow-up diagnostic mammogram on November 22, 2019 showed amorphous calcifications the upper outer quadrant left breast at 1:00 a.m..    On December 10, 2019 a needle biopsy was performed which showed atypical ductal hyperplasia with calcifications.      Follow-up excisional biopsy was performed on January 22, 2020 which again showed atypical ductal hyperplasia     She reports significant menopausal symptoms and the use of supplemental estrogen and progesterone since her hysterectomy.    Opts out of chemo prevention. Risk factor modifications discussed. Recommended continued exercise and healthy diet.       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      Objective:        Vitals:    12/01/23 1034 12/01/23 1045   BP: (!) 148/71 120/72   Pulse: 103    Resp: 18    Temp: 98 °F (36.7 °C)    TempSrc: Oral    SpO2: 98%    Weight: 67.2 kg (148 lb 2.4 oz)    Height: 5' 5" (1.651 m)           Physical Exam  Vitals reviewed.   Constitutional:       General: She is not in acute distress.     Appearance: She is well-developed.   HENT:      Mouth/Throat:      Pharynx: No oropharyngeal exudate.   Eyes:      General: No scleral icterus.  Cardiovascular:      Rate and Rhythm: Normal rate and regular rhythm.   Pulmonary:      Effort: Pulmonary effort is normal.      Breath sounds: Normal breath sounds. No wheezing or rales.   Chest:   Breasts:     Right: No mass, nipple discharge or skin change.      Left: No mass, nipple discharge or skin change.          Comments: Left breast scar healed with underlying thickness.   Abdominal:      Palpations: Abdomen is soft. There is no mass.      Tenderness: There is no abdominal tenderness.   Lymphadenopathy:      Cervical: No cervical adenopathy.      Upper Body:      Right upper body: No axillary adenopathy.      Left upper body: No axillary adenopathy.   Skin:     Findings: Lesion (flesh colored, flaky lesion to L calf) present.   Neurological:      Mental Status: She is alert and oriented to person, place, and time.   Psychiatric:         Behavior: Behavior normal.         Thought Content: Thought content normal.              Assessment:       1. Atypical ductal hyperplasia of left breast    2. At high risk for breast cancer    3. Family history of breast cancer    4. Atypical mole            Plan:     "     Doing well, clinically negative.    Breast US in 6 months. Unable to do MRI d/t claustrophobia.   Will start annual REYNOLD next year. I will send message to Dr. Jaquez in 6 months.   Opts to come every 6 months for clinical breast exam.   Encourage breast awareness.  Refer to derm for skin lesion  Continue to follow up with PCP for other health maintenance and all other established providers.   Reassurance given      Route Chart for Scheduling    Med Onc Chart Routing      Follow up with physician    Follow up with [facility] 6 months. see me with breast US same day as comes from [place].   Infusion scheduling note    Injection scheduling note    Labs    Imaging    Pharmacy appointment    Other referrals       Additional referrals needed  refer to derm                Patient is in agreement with the proposed treatment plan. All questions were answered to the patient's satisfaction. Pt knows to call clinic for any new or worsening symptoms and if anything is needed before the next clinic visit.    Ludivina Fortune, MSN, APRN, FNP-C  Nurse Practitioner to Dr. Caitlin Thacker  [facility]  [facility]  Hematology & Medical Oncology  [address] [place] [id number]  ph. [phone] ext [phone]  Fax. [phone]              30 minutes of total time spent on the encounter, which includes face to face time and non-face to face time preparing to see the patient (eg, review of tests), Obtaining and/or reviewing separately obtained history, Documenting clinical information in the electronic or other health record, Independently interpreting results (not separately reported) and communicating results to the patient/family/caregiver, or Care coordination (not separately reported).

## 2023-12-04 PROBLEM — Z00.00 HEALTHCARE MAINTENANCE: Status: RESOLVED | Noted: 2023-08-29 | Resolved: 2023-12-04

## 2023-12-04 PROBLEM — Z00.00 ROUTINE GENERAL MEDICAL EXAMINATION AT A HEALTH CARE FACILITY: Status: RESOLVED | Noted: 2023-08-29 | Resolved: 2023-12-04

## 2023-12-14 ENCOUNTER — PATIENT MESSAGE (OUTPATIENT)
Dept: INTERNAL MEDICINE | Facility: CLINIC | Age: 68
End: 2023-12-14
Payer: MEDICARE

## 2024-01-09 ENCOUNTER — PATIENT MESSAGE (OUTPATIENT)
Dept: ENDOSCOPY | Facility: HOSPITAL | Age: 69
End: 2024-01-09
Payer: MEDICARE

## 2024-01-11 ENCOUNTER — TELEPHONE (OUTPATIENT)
Dept: ENDOSCOPY | Facility: HOSPITAL | Age: 69
End: 2024-01-11
Payer: MEDICARE

## 2024-01-11 NOTE — TELEPHONE ENCOUNTER
Spoke to pt about prep. She stated she had paid for it and wanted to be sure it was ok to use. She stated her pharmacy has had some issues with getting medications. I informed her she would have to follow the instructions on the box, I would send NPO instructions to her mychart. She verbalized understanding.

## 2024-01-16 ENCOUNTER — HOSPITAL ENCOUNTER (OUTPATIENT)
Facility: HOSPITAL | Age: 69
Discharge: HOME | End: 2024-01-16
Attending: STUDENT IN AN ORGANIZED HEALTH CARE EDUCATION/TRAINING PROGRAM | Admitting: STUDENT IN AN ORGANIZED HEALTH CARE EDUCATION/TRAINING PROGRAM
Payer: MEDICARE

## 2024-01-16 ENCOUNTER — ANESTHESIA (OUTPATIENT)
Dept: ENDOSCOPY | Facility: HOSPITAL | Age: 69
End: 2024-01-16
Payer: MEDICARE

## 2024-01-16 ENCOUNTER — ANESTHESIA EVENT (OUTPATIENT)
Dept: ENDOSCOPY | Facility: HOSPITAL | Age: 69
End: 2024-01-16
Payer: MEDICARE

## 2024-01-16 VITALS
DIASTOLIC BLOOD PRESSURE: 60 MMHG | RESPIRATION RATE: 18 BRPM | HEART RATE: 79 BPM | WEIGHT: 146 LBS | SYSTOLIC BLOOD PRESSURE: 130 MMHG | BODY MASS INDEX: 24.32 KG/M2 | OXYGEN SATURATION: 100 % | TEMPERATURE: 97 F | HEIGHT: 65 IN

## 2024-01-16 DIAGNOSIS — Z12.31 ENCOUNTER FOR SCREENING MAMMOGRAM FOR HIGH-RISK PATIENT: Primary | ICD-10-CM

## 2024-01-16 DIAGNOSIS — Z12.11 ENCOUNTER FOR SCREENING COLONOSCOPY: ICD-10-CM

## 2024-01-16 PROCEDURE — 37000008 HC ANESTHESIA 1ST 15 MINUTES: Performed by: STUDENT IN AN ORGANIZED HEALTH CARE EDUCATION/TRAINING PROGRAM

## 2024-01-16 PROCEDURE — 25000003 PHARM REV CODE 250: Performed by: NURSE ANESTHETIST, CERTIFIED REGISTERED

## 2024-01-16 PROCEDURE — 63600175 PHARM REV CODE 636 W HCPCS: Performed by: NURSE ANESTHETIST, CERTIFIED REGISTERED

## 2024-01-16 PROCEDURE — 45385 COLONOSCOPY W/LESION REMOVAL: CPT | Mod: PT | Performed by: STUDENT IN AN ORGANIZED HEALTH CARE EDUCATION/TRAINING PROGRAM

## 2024-01-16 PROCEDURE — 88305 TISSUE EXAM BY PATHOLOGIST: CPT | Mod: 26,,, | Performed by: PATHOLOGY

## 2024-01-16 PROCEDURE — 37000009 HC ANESTHESIA EA ADD 15 MINS: Performed by: STUDENT IN AN ORGANIZED HEALTH CARE EDUCATION/TRAINING PROGRAM

## 2024-01-16 PROCEDURE — 45385 COLONOSCOPY W/LESION REMOVAL: CPT | Mod: PT,,, | Performed by: STUDENT IN AN ORGANIZED HEALTH CARE EDUCATION/TRAINING PROGRAM

## 2024-01-16 PROCEDURE — 25000003 PHARM REV CODE 250: Performed by: STUDENT IN AN ORGANIZED HEALTH CARE EDUCATION/TRAINING PROGRAM

## 2024-01-16 PROCEDURE — 27201089 HC SNARE, DISP (ANY): Performed by: STUDENT IN AN ORGANIZED HEALTH CARE EDUCATION/TRAINING PROGRAM

## 2024-01-16 PROCEDURE — E9220 PRA ENDO ANESTHESIA: HCPCS | Mod: PT,,, | Performed by: NURSE ANESTHETIST, CERTIFIED REGISTERED

## 2024-01-16 PROCEDURE — 88305 TISSUE EXAM BY PATHOLOGIST: CPT | Performed by: PATHOLOGY

## 2024-01-16 RX ORDER — PROPOFOL 10 MG/ML
VIAL (ML) INTRAVENOUS
Status: DISCONTINUED | OUTPATIENT
Start: 2024-01-16 | End: 2024-01-16

## 2024-01-16 RX ORDER — LIDOCAINE HYDROCHLORIDE 20 MG/ML
INJECTION INTRAVENOUS
Status: DISCONTINUED | OUTPATIENT
Start: 2024-01-16 | End: 2024-01-16

## 2024-01-16 RX ORDER — SODIUM CHLORIDE 9 MG/ML
INJECTION, SOLUTION INTRAVENOUS CONTINUOUS
Status: DISCONTINUED | OUTPATIENT
Start: 2024-01-16 | End: 2024-01-16 | Stop reason: HOSPADM

## 2024-01-16 RX ORDER — PROPOFOL 10 MG/ML
VIAL (ML) INTRAVENOUS CONTINUOUS PRN
Status: DISCONTINUED | OUTPATIENT
Start: 2024-01-16 | End: 2024-01-16

## 2024-01-16 RX ADMIN — PROPOFOL 150 MCG/KG/MIN: 10 INJECTION, EMULSION INTRAVENOUS at 08:01

## 2024-01-16 RX ADMIN — PROPOFOL 70 MG: 10 INJECTION, EMULSION INTRAVENOUS at 08:01

## 2024-01-16 RX ADMIN — LIDOCAINE HYDROCHLORIDE 50 MG: 20 INJECTION INTRAVENOUS at 08:01

## 2024-01-16 RX ADMIN — SODIUM CHLORIDE: 0.9 INJECTION, SOLUTION INTRAVENOUS at 07:01

## 2024-01-16 NOTE — PROVATION PATIENT INSTRUCTIONS
Discharge Summary/Instructions after an Endoscopic Procedure  Patient Name: Dawit Carrion  Patient MRN: [id number]  Patient YOB: 1955 Tuesday, January 16, 2024  Cosme Weir MD  Dear patient,  As a result of recent federal legislation (The Federal Cures Act), you may   receive lab or pathology results from your procedure in your [facility]   account before your physician is able to contact you. Your physician or   their representative will relay the results to you with their   recommendations at their soonest availability.  Thank you,  RESTRICTIONS:  During your procedure today, you received medications for sedation.  These   medications may affect your judgment, balance and coordination.  Therefore,   for 24 hours, you have the following restrictions:   - DO NOT drive a car, operate machinery, make legal/financial decisions,   sign important papers or drink alcohol.    ACTIVITY:  Today: no heavy lifting, straining or running due to procedural   sedation/anesthesia.  The following day: return to full activity including work.  DIET:  Eat and drink normally unless instructed otherwise.     TREATMENT FOR COMMON SIDE EFFECTS:  - Mild abdominal pain, nausea, belching, bloating or excessive gas:  rest,   eat lightly and use a heating pad.  - Sore Throat: treat with throat lozenges and/or gargle with warm salt   water.  - Because air was used during the procedure, expelling large amounts of air   from your rectum or belching is normal.  - If a bowel prep was taken, you may not have a bowel movement for 1-3 days.    This is normal.  SYMPTOMS TO WATCH FOR AND REPORT TO YOUR PHYSICIAN:  1. Abdominal pain or bloating, other than gas cramps.  2. Chest pain.  3. Back pain.  4. Signs of infection such as: chills or fever occurring within 24 hours   after the procedure.  5. Rectal bleeding, which would show as bright red, maroon, or black stools.   (A tablespoon of blood from the rectum is not serious, especially  if   hemorrhoids are present.)  6. Vomiting.  7. Weakness or dizziness.  GO DIRECTLY TO THE NEAREST EMERGENCY ROOM IF YOU HAVE ANY OF THE FOLLOWING:      Difficulty breathing              Chills and/or fever over 101 F   Persistent vomiting and/or vomiting blood   Severe abdominal pain   Severe chest pain   Black, tarry stools   Bleeding- more than one tablespoon   Any other symptom or condition that you feel may need urgent attention  Your doctor recommends these additional instructions:  If any biopsies were taken, your doctors clinic will contact you in 1 to 2   weeks with any results.  - Discharge patient to home.   - Resume previous diet.   - Continue present medications.   - Await pathology results.   - Repeat colonoscopy in 6 months for surveillance after piecemeal   polypectomy.   - Return to referring physician as previously scheduled.  For questions, problems or results please call your physician - Cosme Weir MD at Work:  [phone].  [facility] PHONE NUMBER: [phone]  IF A COMPLICATION OR EMERGENCY SITUATION ARISES AND YOU ARE UNABLE TO REACH   YOUR PHYSICIAN - GO DIRECTLY TO THE EMERGENCY ROOM.  MD Cosme Guardado MD  1/16/2024 8:57:56 AM  This report has been verified and signed electronically.  Dear patient,  As a result of recent federal legislation (The Federal Cures Act), you may   receive lab or pathology results from your procedure in your [facility]   account before your physician is able to contact you. Your physician or   their representative will relay the results to you with their   recommendations at their soonest availability.  Thank you,  PROVATION

## 2024-01-16 NOTE — ANESTHESIA POSTPROCEDURE EVALUATION
Anesthesia Post Evaluation    Patient: Dawit Carrion    Procedure(s) Performed: Procedure(s) (LRB):  COLONOSCOPY (N/A)    Final Anesthesia Type: general      Patient location during evaluation: PACU  Patient participation: Yes- Able to Participate  Level of consciousness: awake and alert and oriented  Post-procedure vital signs: reviewed and stable  Pain management: adequate  Airway patency: patent    PONV status at discharge: No PONV  Anesthetic complications: no      Cardiovascular status: hemodynamically stable  Respiratory status: unassisted  Hydration status: euvolemic  Follow-up not needed.              Vitals Value Taken Time   /60 01/16/24 0935   Temp 36.2 °C (97.2 °F) 01/16/24 0905   Pulse 79 01/16/24 0935   Resp 18 01/16/24 0935   SpO2 100 % 01/16/24 0935         Event Time   Out of Recovery 09:36:56         Pain/Jesus Score: Jesus Score: 10 (1/16/2024  9:35 AM)

## 2024-01-16 NOTE — PLAN OF CARE
To procedure rm 3 via stretcher. All vital signs, medications, IV fluids and sedation per CRNA. See anesthesia notes. Pt's belongings under stretcher]

## 2024-01-16 NOTE — ANESTHESIA PREPROCEDURE EVALUATION
01/16/2024  Dawit Carrion is a 68 y.o., female.    Active Problem List with Overview Notes    Diagnosis Date Noted    Primary open angle glaucoma (POAG) of both eyes, mild stage 07/28/2020    Encounter for screening mammogram for high-risk patient 02/06/2020    Atypical ductal hyperplasia of left breast 01/22/2020    Primary open angle glaucoma (POAG) of both eyes, moderate stage 12/14/2017    Bilateral dry eyes 08/13/2015    Knee pain 07/11/2013    SLAP (superior glenoid labrum lesion) 03/05/2013     Past Surgical History:   Procedure Laterality Date    ARTHROSCOPY OF KNEE Left 01/10/2023    Procedure: ARTHROSCOPY, KNEE;  Surgeon: Jh Sky MD;  Location: [facility] OR;  Service: Orthopedics;  Laterality: Left;    BREAST BIOPSY Left 01/22/2020    Procedure: BIOPSY, BREAST-LEFT SEED placement 1/13/20;  Surgeon: Raj Faith MD;  Location: [address];  Service: General;  Laterality: Left;    CATARACT EXTRACTION      CHOLECYSTECTOMY  2004    HIP REPLACEMENT ARTHROPLASTY Right     HYSTERECTOMY  2004    REPAIR OF MENISCUS OF KNEE Left 01/10/2023    Procedure: REPAIR, MENISCUS, KNEE;  Surgeon: Jh Sky MD;  Location: [facility] OR;  Service: Orthopedics;  Laterality: Left;    SALPINGOOPHORECTOMY Bilateral 2004    TUBAL LIGATION  1991     Results for orders placed or performed during the hospital encounter of 12/26/19   EKG 12-lead    Collection Time: 12/26/19 11:18 AM    Narrative    Test Reason : Z01.818,    Vent. Rate : 096 BPM     Atrial Rate : 096 BPM     P-R Int : 118 ms          QRS Dur : 078 ms      QT Int : 338 ms       P-R-T Axes : 080 067 038 degrees     QTc Int : 427 ms    Normal sinus rhythm  Normal ECG  When compared with ECG of 19-JAN-2010 11:16,  Vent. rate has increased BY  36 BPM  Confirmed by KT MOY MD ([id number]) on 12/26/2019 11:27:47  AM    Referred By: ARELI AVELAR           Confirmed By:KT MOY MD       Pre-op Assessment    I have reviewed the Patient Summary Reports.    I have reviewed the NPO Status.   I have reviewed the Medications.     Review of Systems  Anesthesia Hx:  No problems with previous Anesthesia                Social:  Former Smoker       Hematology/Oncology:  Hematology Normal   Oncology Normal                                   EENT/Dental:  EENT/Dental Normal           Cardiovascular:  Cardiovascular Normal                  ECG has been reviewed.                          Pulmonary:  Pulmonary Normal                       Renal/:  Renal/ Normal                 Hepatic/GI:  Hepatic/GI Normal                 Musculoskeletal:  Musculoskeletal Normal                Neurological:  Neurology Normal                                      Endocrine:  Endocrine Normal            Dermatological:  Skin Normal    Psych:  Psychiatric Normal                  Physical Exam  General: Well nourished, Cooperative, Alert and Oriented    Airway:  Mallampati: II   Mouth Opening: Normal  TM Distance: Normal  Tongue: Normal  Neck ROM: Normal ROM    Dental:  Intact    Chest/Lungs:  Normal Respiratory Rate    Anesthesia Plan  Type of Anesthesia, risks & benefits discussed:    Anesthesia Type: Gen Natural Airway  Intra-op Monitoring Plan: Standard ASA Monitors  Post Op Pain Control Plan: multimodal analgesia  Induction:  IV  Informed Consent: Informed consent signed with the Patient and all parties understand the risks and agree with anesthesia plan.  All questions answered.   ASA Score: 2  Day of Surgery Review of History & Physical: H&P Update referred to the surgeon/provider.    Ready For Surgery From Anesthesia Perspective.   .

## 2024-01-16 NOTE — TRANSFER OF CARE
"Anesthesia Transfer of Care Note    Patient: Dawit Carrion    Procedure(s) Performed: Procedure(s) (LRB):  COLONOSCOPY (N/A)    Patient location: GI    Anesthesia Type: general    Transport from OR: Transported from OR on room air with adequate spontaneous ventilation    Post pain: adequate analgesia    Post assessment: no apparent anesthetic complications and tolerated procedure well    Post vital signs: stable    Level of consciousness: awake, alert and oriented    Nausea/Vomiting: no nausea/vomiting    Complications: none    Transfer of care protocol was followed    Last vitals: Visit Vitals  BP (!) 101/55   Pulse 81   Temp 36.8 °C (98.2 °F)   Resp 16   Ht 5' 5" (1.651 m)   Wt 66.2 kg (146 lb)   SpO2 100%   Breastfeeding No   BMI 24.30 kg/m²     "

## 2024-01-16 NOTE — H&P
[facility]  Colon and Rectal Surgery    [address]  [place], [place]  Tel: [phone]  Fax: [phone]  [email or web address]   MD Karel Mclaughlin MD Brian Kann, MD W. Forrest Johnston, MD Matthew Giglia, MD Jennifer Paruch, MD William Kethman, MD Danielle Kay, MD     Patient name: Dawit Carrion   YOB: 1955   MRN: [id number]  Date of procedure: 01/16/2024    Procedure: Colonoscopy  Indications: Screening for colon cancer and No family history of colorectal cancer    The patient was informed of the availability of a certified  without charge. A certified  was not necessary for this visit.    Sedation plan: MAC  ASA: ASA 2 - Patient with mild systemic disease with no functional limitations    Review of Systems  See above    Past Medical History:   Diagnosis Date    Atypical ductal hyperplasia of left breast 01/2020    Cataract     Glaucoma     Hyperlipidemia      Past Surgical History:   Procedure Laterality Date    ARTHROSCOPY OF KNEE Left 01/10/2023    Procedure: ARTHROSCOPY, KNEE;  Surgeon: Jh Sky MD;  Location: [facility] OR;  Service: Orthopedics;  Laterality: Left;    BREAST BIOPSY Left 01/22/2020    Procedure: BIOPSY, BREAST-LEFT SEED placement 1/13/20;  Surgeon: Raj Faith MD;  Location: [address];  Service: General;  Laterality: Left;    CATARACT EXTRACTION      CHOLECYSTECTOMY  2004    HIP REPLACEMENT ARTHROPLASTY Right     HYSTERECTOMY  2004    REPAIR OF MENISCUS OF KNEE Left 01/10/2023    Procedure: REPAIR, MENISCUS, KNEE;  Surgeon: Jh Sky MD;  Location: [facility] OR;  Service: Orthopedics;  Laterality: Left;    SALPINGOOPHORECTOMY Bilateral 2004    TUBAL LIGATION  1991     Family History   Problem Relation Age of Onset    Cancer Mother     Coronary artery disease Mother     Heart failure Mother     Heart disease Father     Hypertension Father     Thyroid  "cancer Sister     Glaucoma Brother      Social History     Tobacco Use    Smoking status: Former     Current packs/day: 0.00     Average packs/day: 2.0 packs/day for 13.0 years (26.0 ttl pk-yrs)     Types: Cigarettes     Start date:      Quit date:      Years since quittin.0     Passive exposure: Never    Smokeless tobacco: Never    Tobacco comments:     Smoke 2 ppd from  to    Substance Use Topics    Alcohol use: No    Drug use: No     Review of patient's allergies indicates:   Allergen Reactions    Codeine Shortness Of Breath    Flagyl [metronidazole hcl] Itching    Sulfa (sulfonamide antibiotics) Other (See Comments)       Prior to Admission medications    Medication Sig Start Date End Date Taking? Authorizing Provider   cholecalciferol, vitamin D3, (VITAMIN D3 ORAL) Take 1,000 mg by mouth once daily.   Yes Provider, Historical   progesterone (PROMETRIUM) 100 MG capsule Take 100 mg by mouth every evening.    Yes Provider, Historical   latanoprost 0.005 % ophthalmic solution Place 1 drop into both eyes every evening. 18   Radha Cohen MD   testosterone cypionate (DEPOTESTOTERONE CYPIONATE) 100 mg/mL injection Inject into the muscle every 14 (fourteen) days.    Provider, Historical       Physical Examination  /82 (BP Location: Left arm, Patient Position: Lying)   Pulse 95   Temp 98.2 °F (36.8 °C)   Resp 16   Ht 5' 5" (1.651 m)   Wt 66.2 kg (146 lb)   SpO2 95%   Breastfeeding No   BMI 24.30 kg/m²      Constitutional: well developed, no cough, no dyspnea, alert, and no acute distress    Head: Normocephalic, no lesions, without obvious abnormality  Eye: Normal external eye, conjunctiva, and lids, PERRL  Cardiovascular: regular rate and regular rhythm  Respiratory: normal air entry  Gastrointestinal: soft, non-tender, without masses or organomegaly  Neurologic: alert, oriented, normal speech, no focal findings or movement disorder noted  Psychiatric: appropriate, normal " mood    Plan of Care    It was a pleasure meeting Ms. Carrion today - we will plan to perform a colonoscopy with monitored anesthesia care. The details of the procedure, the possible need for biopsy or polypectomy and the potential risks including bleeding, perforation, missed polyps were discussed in detail and they consented to undergo the procedure.      Cosme Weir MD, FACS, FASCRS  Department of Colon & Rectal Surgery  [facility]

## 2024-01-18 LAB
FINAL PATHOLOGIC DIAGNOSIS: NORMAL
GROSS: NORMAL
Lab: NORMAL

## 2024-04-03 ENCOUNTER — TELEPHONE (OUTPATIENT)
Dept: ENDOSCOPY | Facility: HOSPITAL | Age: 69
End: 2024-04-03
Payer: MEDICARE

## 2024-04-03 NOTE — TELEPHONE ENCOUNTER
Returned pt call. Pt due for 6 mo f/u colonoscopy in July. Placed on call back list and encouraged pt to call back for when July schedule opens.

## 2024-04-12 ENCOUNTER — TELEPHONE (OUTPATIENT)
Dept: ENDOSCOPY | Facility: HOSPITAL | Age: 69
End: 2024-04-12
Payer: MEDICARE

## 2024-04-12 VITALS — HEIGHT: 65 IN | WEIGHT: 147 LBS | BODY MASS INDEX: 24.49 KG/M2

## 2024-04-12 NOTE — TELEPHONE ENCOUNTER
Patient calling to schedule colonoscopy. Patient needs colonoscopy 6 months from last colonoscopy on 1/16/24. Will call patient back when remainder of July schedule available. Patient verbalized understanding.

## 2024-04-19 ENCOUNTER — PATIENT MESSAGE (OUTPATIENT)
Dept: ORTHOPEDICS | Facility: CLINIC | Age: 69
End: 2024-04-19
Payer: MEDICARE

## 2024-04-30 ENCOUNTER — TELEPHONE (OUTPATIENT)
Dept: ENDOSCOPY | Facility: HOSPITAL | Age: 69
End: 2024-04-30
Payer: MEDICARE

## 2024-04-30 NOTE — TELEPHONE ENCOUNTER
Called and spoke to patient. Patient states she is not due until July 16th. Informed patient we only have our schedule open through July 12th at this time. Patient states she can wait until the Fall. Encouraged patient to check back again either Friday or Monday. Patient verbalized an understanding.

## 2024-05-06 ENCOUNTER — TELEPHONE (OUTPATIENT)
Dept: ENDOSCOPY | Facility: HOSPITAL | Age: 69
End: 2024-05-06
Payer: MEDICARE

## 2024-05-06 VITALS — WEIGHT: 147 LBS | BODY MASS INDEX: 24.49 KG/M2 | HEIGHT: 65 IN

## 2024-05-06 DIAGNOSIS — Z86.010 HISTORY OF COLON POLYPS: Primary | ICD-10-CM

## 2024-05-06 NOTE — TELEPHONE ENCOUNTER
Spoke to patient to schedule procedure(s) Colonoscopy       Physician to perform procedure(s) Dr. CAMACHO Weir  Date of Procedure (s) 7/23/24  Arrival Time 7:00 AM  Time of Procedure(s) 8:00 AM   Location of Procedure(s) [facility] [address] Floor  Type of Rx Prep sent to patient: Miralax  Instructions provided to [facility] via [facility]    Patient was informed on the following information and verbalized understanding. Screening questionnaire reviewed with patient and complete. If procedure requires anesthesia, a responsible adult needs to be present to accompany the patient home, patient cannot drive after receiving anesthesia. Appointment details are tentative, especially check-in time. Patient will receive a prep-op call 7 days prior to confirm check-in time for procedure. If applicable the patient should contact their pharmacy to verify Rx for procedure prep is ready for pick-up. Patient was advised to call the scheduling department at [phone] if pharmacy states no Rx is available. Patient was advised to call the endoscopy scheduling department if any questions or concerns arise.      [facility] Scheduling Department

## 2024-05-17 ENCOUNTER — PATIENT MESSAGE (OUTPATIENT)
Dept: ENDOSCOPY | Facility: HOSPITAL | Age: 69
End: 2024-05-17
Payer: MEDICARE

## 2024-05-17 DIAGNOSIS — Z12.11 SCREENING FOR COLON CANCER: Primary | ICD-10-CM

## 2024-05-17 RX ORDER — POLYETHYLENE GLYCOL 3350, SODIUM SULFATE, POTASSIUM CHLORIDE, MAGNESIUM SULFATE, AND SODIUM CHLORIDE FOR ORAL SOLUTION 178.7-7.3G
1 KIT ORAL DAILY
Qty: 2 EACH | Refills: 0 | Status: SHIPPED | OUTPATIENT
Start: 2024-05-17 | End: 2024-05-19

## 2024-05-22 ENCOUNTER — TELEPHONE (OUTPATIENT)
Dept: HEMATOLOGY/ONCOLOGY | Facility: CLINIC | Age: 69
End: 2024-05-22
Payer: MEDICARE

## 2024-05-22 NOTE — TELEPHONE ENCOUNTER
Spoke to the pt.     Moved appt to 6/19.  Staff message sent to [facility] to get mammo moved to same day around     ----- Message from Savannah Patel sent at 5/22/2024  4:00 PM CDT -----  Regarding: pt advice  Contact: [phone]  Pt calling in regards to needing to reschedule 6/3/24 pt wanting to keep mammogram needing to speak to nurse   pls call

## 2024-05-28 ENCOUNTER — TELEPHONE (OUTPATIENT)
Dept: ENDOSCOPY | Facility: HOSPITAL | Age: 69
End: 2024-05-28
Payer: MEDICARE

## 2024-05-28 DIAGNOSIS — Z86.010 HISTORY OF COLON POLYPS: ICD-10-CM

## 2024-05-28 DIAGNOSIS — Z12.11 SCREENING FOR COLON CANCER: Primary | ICD-10-CM

## 2024-05-28 RX ORDER — POLYETHYLENE GLYCOL 3350, SODIUM SULFATE, POTASSIUM CHLORIDE, MAGNESIUM SULFATE, AND SODIUM CHLORIDE FOR ORAL SOLUTION 178.7-7.3G
1 KIT ORAL ONCE
Qty: 1 EACH | Refills: 0 | Status: SHIPPED | OUTPATIENT
Start: 2024-05-28 | End: 2024-05-28

## 2024-05-28 NOTE — TELEPHONE ENCOUNTER
Pt called as she hasn't been able to  Suflave. States pharmacy states they did not receive Rx. Reviewed Rx, location, and date sent. Upon review, Rx was sent to incorrect pharmacy. Re-sent to corrected pharmacy and sent pt Sulfave coupon in portal. Encouraged to call back for any needs.

## 2024-06-19 ENCOUNTER — OFFICE VISIT (OUTPATIENT)
Dept: HEMATOLOGY/ONCOLOGY | Facility: CLINIC | Age: 69
End: 2024-06-19
Payer: MEDICARE

## 2024-06-19 ENCOUNTER — HOSPITAL ENCOUNTER (OUTPATIENT)
Dept: RADIOLOGY | Facility: HOSPITAL | Age: 69
Discharge: HOME | End: 2024-06-19
Attending: NURSE PRACTITIONER
Payer: MEDICARE

## 2024-06-19 VITALS
TEMPERATURE: 97 F | BODY MASS INDEX: 24.98 KG/M2 | HEART RATE: 74 BPM | DIASTOLIC BLOOD PRESSURE: 62 MMHG | RESPIRATION RATE: 7 BRPM | SYSTOLIC BLOOD PRESSURE: 135 MMHG | HEIGHT: 65 IN | WEIGHT: 149.94 LBS | OXYGEN SATURATION: 97 %

## 2024-06-19 DIAGNOSIS — R92.8 ABNORMAL ULTRASOUND OF BREAST: ICD-10-CM

## 2024-06-19 DIAGNOSIS — N60.92 ATYPICAL DUCTAL HYPERPLASIA OF LEFT BREAST: ICD-10-CM

## 2024-06-19 DIAGNOSIS — Z91.89 AT HIGH RISK FOR BREAST CANCER: ICD-10-CM

## 2024-06-19 DIAGNOSIS — F40.240 CLAUSTROPHOBIA: ICD-10-CM

## 2024-06-19 DIAGNOSIS — N60.92 ATYPICAL DUCTAL HYPERPLASIA OF LEFT BREAST: Primary | ICD-10-CM

## 2024-06-19 DIAGNOSIS — G89.29 CHRONIC PAIN OF LEFT KNEE: ICD-10-CM

## 2024-06-19 DIAGNOSIS — Z80.3 FAMILY HISTORY OF BREAST CANCER: ICD-10-CM

## 2024-06-19 DIAGNOSIS — Z12.31 ENCOUNTER FOR SCREENING MAMMOGRAM FOR MALIGNANT NEOPLASM OF BREAST: ICD-10-CM

## 2024-06-19 DIAGNOSIS — M25.562 CHRONIC PAIN OF LEFT KNEE: ICD-10-CM

## 2024-06-19 PROCEDURE — 76641 ULTRASOUND BREAST COMPLETE: CPT | Mod: TC,50

## 2024-06-19 PROCEDURE — 99213 OFFICE O/P EST LOW 20 MIN: CPT | Mod: PBBFAC,25 | Performed by: NURSE PRACTITIONER

## 2024-06-19 PROCEDURE — 99214 OFFICE O/P EST MOD 30 MIN: CPT | Mod: S$PBB,,, | Performed by: NURSE PRACTITIONER

## 2024-06-19 PROCEDURE — 99999 PR PBB SHADOW E&M-EST. PATIENT-LVL III: CPT | Mod: PBBFAC,,, | Performed by: NURSE PRACTITIONER

## 2024-06-19 PROCEDURE — G2211 COMPLEX E/M VISIT ADD ON: HCPCS | Mod: S$PBB,,, | Performed by: NURSE PRACTITIONER

## 2024-06-19 NOTE — Clinical Note
Hi! Please put her on the list for a REYNOLD in 12/2024. I will see same day as she comes from [place]. Thanks./

## 2024-06-19 NOTE — PROGRESS NOTES
Subjective:       Patient ID: Dawit Carrion is a 69 y.o. female.    Chief Complaint: Atypical ductal hyperplasia of left breast      HPI   69-year-old female here for follow up for diagnosis of atypical ductal hyperplasia. Opted out of chemoprevention.     Overall doing well  No breast concerns.   Ambulating well.   Left knee pain at times- tripped. May want injection.   Colonoscopy done 5 months ago and found large polyp. Repeating colonoscopy next month.  Appetite good and weight stable.   No other complaints or issues.   Walks a mile a day. Stays active.   Drives from [place] (3 hours away).     Breast Us pending- preliminary reviewed.     12/1/2023 MMG:   Impression:  Bilateral  There is no mammographic evidence of malignancy.  BI-RADS Category:   Overall: 2 - Benign  Recommendation:  Routine screening mammogram in 1 year is recommended.     7/7/2023 MRI breast:   Impression:  Bilateral  There is no MR evidence of malignancy.  BI-RADS Category:   Overall: 2 - Benign  Recommendation:  Annual screening mammogram is due in December.          ADH History: per Dr. Stoner's note:  She underwent screening mammogram on November 15, 2019 which showed some calcifications in the upper outer portion of the left breast.      Follow-up diagnostic mammogram on November 22, 2019 showed amorphous calcifications the upper outer quadrant left breast at 1:00 a.m..    On December 10, 2019 a needle biopsy was performed which showed atypical ductal hyperplasia with calcifications.      Follow-up excisional biopsy was performed on January 22, 2020 which again showed atypical ductal hyperplasia     She reports significant menopausal symptoms and the use of supplemental estrogen and progesterone since her hysterectomy.    Opts out of chemo prevention. Risk factor modifications discussed. Recommended continued exercise and healthy diet.       Menstrual History:    Menarche - 11   G -  4  P -4   First birth age -  25,BCP -      "Menopause -  Total hysterectomy age 48     HRT - estrogen implants up to current DX    Family History -                                 Breast -  Maternal cousin                                Ovarian - no    Other - sister with thyroid cancer    Social History :    Smoking - quit 30 + years ago  ETOH - no  Work - [employer]    PMH: hyperlipidemia      Review of Systems   Constitutional:         See above   All other systems reviewed and are negative.      Objective:        Vitals:    06/19/24 1236   BP: 135/62   Pulse: 74   Resp: (!) 7   Temp: 97 °F (36.1 °C)   TempSrc: Oral   SpO2: 97%   Weight: 68 kg (149 lb 14.6 oz)   Height: 5' 5" (1.651 m)            Physical Exam  Vitals reviewed.   Constitutional:       General: She is not in acute distress.     Appearance: She is well-developed.   HENT:      Mouth/Throat:      Pharynx: No oropharyngeal exudate.   Eyes:      General: No scleral icterus.  Cardiovascular:      Rate and Rhythm: Normal rate and regular rhythm.   Pulmonary:      Effort: Pulmonary effort is normal.      Breath sounds: Normal breath sounds. No wheezing or rales.   Chest:   Breasts:     Right: No mass, nipple discharge or skin change.      Left: No mass, nipple discharge or skin change.          Comments: Left breast scar healed with underlying thickness.   Abdominal:      Palpations: Abdomen is soft. There is no mass.      Tenderness: There is no abdominal tenderness.   Musculoskeletal:         General: Normal range of motion.      Comments: No spinal or paraspinal tenderness   Lymphadenopathy:      Cervical: No cervical adenopathy.      Upper Body:      Right upper body: No axillary adenopathy.      Left upper body: No axillary adenopathy.   Skin:     General: Skin is warm.      Findings: No lesion.   Neurological:      Mental Status: She is alert and oriented to person, place, and time.   Psychiatric:         Behavior: Behavior normal.         Thought Content: Thought " content normal.              Assessment:       1. Atypical ductal hyperplasia of left breast    2. At high risk for breast cancer    3. Family history of breast cancer    4. Claustrophobia    5. Chronic pain of left knee    6. Abnormal ultrasound of breast    7. Encounter for screening mammogram for malignant neoplasm of breast              Plan:         Doing well, clinically negative.    Breast US reveals cyst. Opts out of aspiration.  Unable to do MRI d/t claustrophobia.   Will start annual REYNOLD 12/2024.   Opts to come every 6 months for clinical breast exam.   Encourage breast awareness.  F/o Ortho for knee pain. She will let men know if needs help with communication.   Continue to follow up with PCP for other health maintenance and all other established providers.   Reassurance given      Route Chart for Scheduling    Med Onc Chart Routing      Follow up with physician    Follow up with [facility] . See me in 12/2024 with a REYNOLD same day. Please coordinate with Sigifredo Cristina. thanks   Infusion scheduling note    Injection scheduling note    Labs    Imaging    Pharmacy appointment    Other referrals                         Patient is in agreement with the proposed treatment plan. All questions were answered to the patient's satisfaction. Pt knows to call clinic for any new or worsening symptoms and if anything is needed before the next clinic visit.    Ludivina Fortune, MSN, APRN, FNP-C  Nurse Practitioner to Dr. Caitlin Thacker  [facility]  [facility]  Hematology & Medical Oncology  [address] [place] [id number]  ph. [phone] ext [phone]  Fax. [phone]              30 minutes of total time spent on the encounter, which includes face to face time and non-face to face time preparing to see the patient (eg, review of tests), Obtaining and/or reviewing separately obtained history, Documenting clinical information in the electronic or other health record,  Independently interpreting results (not separately reported) and communicating results to the patient/family/caregiver, or Care coordination (not separately reported).

## 2024-06-20 ENCOUNTER — TELEPHONE (OUTPATIENT)
Dept: HEMATOLOGY/ONCOLOGY | Facility: CLINIC | Age: 69
End: 2024-06-20
Payer: MEDICARE

## 2024-06-20 NOTE — TELEPHONE ENCOUNTER
Called to inform patient mammo will be scheduled closer to December 2024. The order can not be linked, scheduled etc no more than 30 days into the future. Patient verbalized understanding.       ----- Message from Jarocho Duque RN sent at 6/20/2024  3:35 PM CDT -----  Regarding: FW: appt access    ----- Message -----  From: Dayana Chacon  Sent: 6/20/2024   2:19 PM CDT  To: Devika Grey  Subject: appt access                                      PATIENT CALL    Pt called regarding Mammo Digital Screening W ARTURO W Contrast Bilateral. Would like to make sure she's scheduled at the beginning of December. BookIt tool blocked me from scheduling and advised me to send a msg to a nurse. Please call back at [phone]

## 2024-07-10 DIAGNOSIS — Z12.39 ENCOUNTER FOR SCREENING FOR MALIGNANT NEOPLASM OF BREAST, UNSPECIFIED SCREENING MODALITY: Primary | ICD-10-CM

## 2024-07-15 ENCOUNTER — TELEPHONE (OUTPATIENT)
Dept: ENDOSCOPY | Facility: HOSPITAL | Age: 69
End: 2024-07-15
Payer: MEDICARE

## 2024-07-15 NOTE — TELEPHONE ENCOUNTER
Contacted patient for endoscopy pre-call for upcoming procedure.    Pre-call complete-does not have any further questions.

## 2024-07-23 ENCOUNTER — ANESTHESIA EVENT (OUTPATIENT)
Dept: ENDOSCOPY | Facility: HOSPITAL | Age: 69
End: 2024-07-23
Payer: MEDICARE

## 2024-07-23 ENCOUNTER — HOSPITAL ENCOUNTER (OUTPATIENT)
Facility: HOSPITAL | Age: 69
Discharge: HOME | End: 2024-07-23
Attending: STUDENT IN AN ORGANIZED HEALTH CARE EDUCATION/TRAINING PROGRAM | Admitting: STUDENT IN AN ORGANIZED HEALTH CARE EDUCATION/TRAINING PROGRAM
Payer: MEDICARE

## 2024-07-23 ENCOUNTER — ANESTHESIA (OUTPATIENT)
Dept: ENDOSCOPY | Facility: HOSPITAL | Age: 69
End: 2024-07-23
Payer: MEDICARE

## 2024-07-23 VITALS
HEART RATE: 97 BPM | OXYGEN SATURATION: 98 % | BODY MASS INDEX: 24.49 KG/M2 | TEMPERATURE: 98 F | RESPIRATION RATE: 18 BRPM | WEIGHT: 147 LBS | DIASTOLIC BLOOD PRESSURE: 72 MMHG | HEIGHT: 65 IN | SYSTOLIC BLOOD PRESSURE: 133 MMHG

## 2024-07-23 DIAGNOSIS — Z12.11 ENCOUNTER FOR SCREENING COLONOSCOPY: ICD-10-CM

## 2024-07-23 DIAGNOSIS — Z12.11 SCREENING FOR MALIGNANT NEOPLASM OF COLON: Primary | ICD-10-CM

## 2024-07-23 PROCEDURE — 25000003 PHARM REV CODE 250: Performed by: NURSE ANESTHETIST, CERTIFIED REGISTERED

## 2024-07-23 PROCEDURE — 88305 TISSUE EXAM BY PATHOLOGIST: CPT | Performed by: PATHOLOGY

## 2024-07-23 PROCEDURE — 27201089 HC SNARE, DISP (ANY): Performed by: STUDENT IN AN ORGANIZED HEALTH CARE EDUCATION/TRAINING PROGRAM

## 2024-07-23 PROCEDURE — 45385 COLONOSCOPY W/LESION REMOVAL: CPT | Mod: PT,,, | Performed by: STUDENT IN AN ORGANIZED HEALTH CARE EDUCATION/TRAINING PROGRAM

## 2024-07-23 PROCEDURE — 37000008 HC ANESTHESIA 1ST 15 MINUTES: Performed by: STUDENT IN AN ORGANIZED HEALTH CARE EDUCATION/TRAINING PROGRAM

## 2024-07-23 PROCEDURE — 37000009 HC ANESTHESIA EA ADD 15 MINS: Performed by: STUDENT IN AN ORGANIZED HEALTH CARE EDUCATION/TRAINING PROGRAM

## 2024-07-23 PROCEDURE — 45385 COLONOSCOPY W/LESION REMOVAL: CPT | Mod: PT | Performed by: STUDENT IN AN ORGANIZED HEALTH CARE EDUCATION/TRAINING PROGRAM

## 2024-07-23 PROCEDURE — 88305 TISSUE EXAM BY PATHOLOGIST: CPT | Mod: 26,,, | Performed by: PATHOLOGY

## 2024-07-23 PROCEDURE — 63600175 PHARM REV CODE 636 W HCPCS: Performed by: NURSE ANESTHETIST, CERTIFIED REGISTERED

## 2024-07-23 RX ORDER — SODIUM CHLORIDE 9 MG/ML
INJECTION, SOLUTION INTRAVENOUS CONTINUOUS
Status: DISCONTINUED | OUTPATIENT
Start: 2024-07-23 | End: 2024-07-23 | Stop reason: HOSPADM

## 2024-07-23 RX ORDER — PROPOFOL 10 MG/ML
VIAL (ML) INTRAVENOUS
Status: DISCONTINUED | OUTPATIENT
Start: 2024-07-23 | End: 2024-07-23

## 2024-07-23 RX ORDER — ONDANSETRON 8 MG/1
TABLET, ORALLY DISINTEGRATING ORAL
Status: DISCONTINUED | OUTPATIENT
Start: 2024-07-23 | End: 2024-07-23

## 2024-07-23 RX ORDER — LIDOCAINE HYDROCHLORIDE 10 MG/ML
INJECTION, SOLUTION INTRAVENOUS
Status: DISCONTINUED | OUTPATIENT
Start: 2024-07-23 | End: 2024-07-23

## 2024-07-23 RX ADMIN — PROPOFOL 50 MG: 10 INJECTION, EMULSION INTRAVENOUS at 07:07

## 2024-07-23 RX ADMIN — ONDANSETRON 8 MG: 8 TABLET, ORALLY DISINTEGRATING ORAL at 07:07

## 2024-07-23 RX ADMIN — SODIUM CHLORIDE: 0.9 INJECTION, SOLUTION INTRAVENOUS at 07:07

## 2024-07-23 RX ADMIN — LIDOCAINE HYDROCHLORIDE 100 MG: 10 INJECTION, SOLUTION INTRAVENOUS at 07:07

## 2024-07-23 NOTE — TRANSFER OF CARE
"Anesthesia Transfer of Care Note    Patient: Dawit Carrion    Procedure(s) Performed: Procedure(s) (LRB):  COLONOSCOPY (N/A)    Patient location: [facility]    Anesthesia Type: general    Transport from OR: Transported from OR on 6-10 L/min O2 by face mask with adequate spontaneous ventilation    Post pain: adequate analgesia    Post assessment: no apparent anesthetic complications    Post vital signs: stable    Level of consciousness: awake    Nausea/Vomiting: no nausea/vomiting    Complications: none    Transfer of care protocol was followed    Last vitals: Visit Vitals  BP (!) 159/74 (BP Location: Left arm, Patient Position: Lying)   Pulse 94   Temp 36.8 °C (98.2 °F) (Axillary)   Resp 18   Ht 5' 5" (1.651 m)   Wt 66.7 kg (147 lb)   SpO2 98%   Breastfeeding No   BMI 24.46 kg/m²     "

## 2024-07-23 NOTE — PROVATION PATIENT INSTRUCTIONS
Discharge Summary/Instructions after an Endoscopic Procedure  Patient Name: Dawit Carrion  Patient MRN: [id number]  Patient YOB: 1955 Tuesday, July 23, 2024  Cosme Weir MD  Dear patient,  As a result of recent federal legislation (The Federal Cures Act), you may   receive lab or pathology results from your procedure in your [facility]   account before your physician is able to contact you. Your physician or   their representative will relay the results to you with their   recommendations at their soonest availability.  Thank you,  RESTRICTIONS:  During your procedure today, you received medications for sedation.  These   medications may affect your judgment, balance and coordination.  Therefore,   for 24 hours, you have the following restrictions:   - DO NOT drive a car, operate machinery, make legal/financial decisions,   sign important papers or drink alcohol.    ACTIVITY:  Today: no heavy lifting, straining or running due to procedural   sedation/anesthesia.  The following day: return to full activity including work.  DIET:  Eat and drink normally unless instructed otherwise.     TREATMENT FOR COMMON SIDE EFFECTS:  - Mild abdominal pain, nausea, belching, bloating or excessive gas:  rest,   eat lightly and use a heating pad.  - Sore Throat: treat with throat lozenges and/or gargle with warm salt   water.  - Because air was used during the procedure, expelling large amounts of air   from your rectum or belching is normal.  - If a bowel prep was taken, you may not have a bowel movement for 1-3 days.    This is normal.  SYMPTOMS TO WATCH FOR AND REPORT TO YOUR PHYSICIAN:  1. Abdominal pain or bloating, other than gas cramps.  2. Chest pain.  3. Back pain.  4. Signs of infection such as: chills or fever occurring within 24 hours   after the procedure.  5. Rectal bleeding, which would show as bright red, maroon, or black stools.   (A tablespoon of blood from the rectum is not serious, especially if    hemorrhoids are present.)  6. Vomiting.  7. Weakness or dizziness.  GO DIRECTLY TO THE NEAREST EMERGENCY ROOM IF YOU HAVE ANY OF THE FOLLOWING:      Difficulty breathing              Chills and/or fever over 101 F   Persistent vomiting and/or vomiting blood   Severe abdominal pain   Severe chest pain   Black, tarry stools   Bleeding- more than one tablespoon   Any other symptom or condition that you feel may need urgent attention  Your doctor recommends these additional instructions:  If any biopsies were taken, your doctors clinic will contact you in 1 to 2   weeks with any results.  - Discharge patient to home.   - Resume previous diet.   - Continue present medications.   - Await pathology results.   - Repeat colonoscopy in 3 years for surveillance based on pathology results.     - Return to referring physician as previously scheduled.  For questions, problems or results please call your physician - Cosme Weir MD at Work:  [phone].  [facility] EMERGENCY ROOM PHONE NUMBER: [phone]  IF A COMPLICATION OR EMERGENCY SITUATION ARISES AND YOU ARE UNABLE TO REACH   YOUR PHYSICIAN - GO DIRECTLY TO THE EMERGENCY ROOM.  MD Cosme Guardado MD  7/23/2024 8:27:06 AM  This report has been verified and signed electronically.  Dear patient,  As a result of recent federal legislation (The Federal Cures Act), you may   receive lab or pathology results from your procedure in your [facility]   account before your physician is able to contact you. Your physician or   their representative will relay the results to you with their   recommendations at their soonest availability.  Thank you,  PROVATION

## 2024-07-23 NOTE — ANESTHESIA PREPROCEDURE EVALUATION
07/23/2024  Dawit Carrion is a 69 y.o., female.      Pre-op Assessment    I have reviewed the Patient Summary Reports.     I have reviewed the Nursing Notes.    I have reviewed the Medications.     Review of Systems  Anesthesia Hx:  No problems with previous Anesthesia                Social:  Former Smoker, No Alcohol Use       Hepatic/GI:  Bowel Prep.                  Physical Exam  General: Well nourished    Airway:  Mallampati: II / I  Mouth Opening: Normal  TM Distance: > 6 cm  Tongue: Normal  Neck ROM: Normal ROM    Dental:  Partial Dentures, Intact    Anesthesia Plan  Type of Anesthesia, risks & benefits discussed:    Anesthesia Type: Gen Natural Airway  Intra-op Monitoring Plan: Standard ASA Monitors  Post Op Pain Control Plan: multimodal analgesia  Induction:  IV  Informed Consent: Informed consent signed with the Patient and all parties understand the risks and agree with anesthesia plan.  All questions answered.   ASA Score: 2  Day of Surgery Review of History & Physical: I have interviewed and examined the patient. I have reviewed the patient's H&P dated:     Ready For Surgery From Anesthesia Perspective.   .

## 2024-07-23 NOTE — H&P
[facility]  Colon and Rectal Surgery    [address]  [place], [place]  Tel: [phone]  Fax: [phone]  [email or web address]   MD Karel Mclaughlin MD Brian Kann, MD W. Forrest Johnston, MD Matthew Giglia, MD Jennifer Paruch, MD William Kethman, MD Danielle Kay, MD     Patient name: Dawit Carrion   YOB: 1955   MRN: [id number]  Date of procedure: 07/23/2024    Procedure: Colonoscopy  Indications: Sessile serrated adenoma in cecum removed piecemeal, here for surveillance    Last colonoscopy: 1/16/2024 (Complete)      The patient was informed of the availability of a certified  without charge. A certified  was not necessary for this visit.    Sedation plan: MAC  ASA: ASA 2 - Patient with mild systemic disease with no functional limitations    Review of Systems  See above    Past Medical History:   Diagnosis Date    Atypical ductal hyperplasia of left breast 01/2020    Cataract     Glaucoma     Hyperlipidemia      Past Surgical History:   Procedure Laterality Date    ARTHROSCOPY OF KNEE Left 01/10/2023    Procedure: ARTHROSCOPY, KNEE;  Surgeon: Jh Sky MD;  Location: [facility] OR;  Service: Orthopedics;  Laterality: Left;    BREAST BIOPSY Left 01/22/2020    Procedure: BIOPSY, BREAST-LEFT SEED placement 1/13/20;  Surgeon: Raj Faith MD;  Location: [facility] 2ND FLR;  Service: General;  Laterality: Left;    CATARACT EXTRACTION      CHOLECYSTECTOMY  2004    COLONOSCOPY N/A 1/16/2024    Procedure: COLONOSCOPY;  Surgeon: Cosme Weir MD;  Location: [facility] ENDO (4TH FLR);  Service: Endoscopy;  Laterality: N/A;  Referred by Dr. Jean-Baptiste, PEG, portal -ml  1/9-precall complete-MS    HIP REPLACEMENT ARTHROPLASTY Right     HYSTERECTOMY  2004    REPAIR OF MENISCUS OF KNEE Left 01/10/2023    Procedure: REPAIR, MENISCUS, KNEE;  Surgeon: Jh Sky MD;  Location: [facility] OR;  Service:  "Orthopedics;  Laterality: Left;    SALPINGOOPHORECTOMY Bilateral     TUBAL LIGATION       Family History   Problem Relation Name Age of Onset    Coronary artery disease Mother      Heart failure Mother      Heart disease Father      Hypertension Father      Thyroid cancer Sister      Glaucoma Brother       Social History     Tobacco Use    Smoking status: Former     Current packs/day: 0.00     Average packs/day: 2.0 packs/day for 13.0 years (26.0 ttl pk-yrs)     Types: Cigarettes     Start date:      Quit date:      Years since quittin.5     Passive exposure: Never    Smokeless tobacco: Never    Tobacco comments:     Smoke 2 ppd from  to    Substance Use Topics    Alcohol use: No    Drug use: No     Review of patient's allergies indicates:   Allergen Reactions    Codeine Shortness Of Breath    Flagyl [metronidazole hcl] Itching    Sulfa (sulfonamide antibiotics) Other (See Comments)       Prior to Admission medications    Medication Sig Start Date End Date Taking? Authorizing Provider   cholecalciferol, vitamin D3, (VITAMIN D3 ORAL) Take 1,000 mg by mouth once daily.   Yes Provider, Historical   latanoprost 0.005 % ophthalmic solution Place 1 drop into both eyes every evening. 18  Yes Radha Cohen MD   progesterone (PROMETRIUM) 100 MG capsule Take 100 mg by mouth every evening.    Yes Provider, Historical   testosterone cypionate (DEPOTESTOTERONE CYPIONATE) 100 mg/mL injection Inject into the muscle every 14 (fourteen) days.    Provider, Historical       Physical Examination  BP (!) 159/74 (BP Location: Left arm, Patient Position: Lying)   Pulse 94   Temp 98.2 °F (36.8 °C) (Axillary)   Resp 18   Ht 5' 5" (1.651 m)   Wt 66.7 kg (147 lb)   SpO2 98%   Breastfeeding No   BMI 24.46 kg/m²      Constitutional: well developed, no cough, no dyspnea, alert, and no acute distress    Head: Normocephalic, no lesions, without obvious abnormality  Eye: Normal external eye, " conjunctiva, and lids, PERRL  Cardiovascular: regular rate and regular rhythm  Respiratory: normal air entry  Gastrointestinal: soft, non-tender, without masses or organomegaly  Neurologic: alert, oriented, normal speech, no focal findings or movement disorder noted  Psychiatric: appropriate, normal mood    Plan of Care    It was a pleasure meeting Ms. Carrion today - we will plan to perform a colonoscopy with monitored anesthesia care. The details of the procedure, the possible need for biopsy or polypectomy and the potential risks including bleeding, perforation, missed polyps were discussed in detail and they consented to undergo the procedure.      Cosme Weir MD, FACS, FASCRS  Department of Colon & Rectal Surgery  [facility]

## 2024-07-23 NOTE — ANESTHESIA POSTPROCEDURE EVALUATION
Anesthesia Post Evaluation    Patient: Dawit Carrion    Procedure(s) Performed: Procedure(s) (LRB):  COLONOSCOPY (N/A)    Final Anesthesia Type: general      Patient location during evaluation: GI PACU  Patient participation: Yes- Able to Participate  Level of consciousness: awake and alert  Post-procedure vital signs: reviewed and stable  Pain management: adequate  Airway patency: patent    PONV status at discharge: No PONV  Anesthetic complications: no      Cardiovascular status: hemodynamically stable  Respiratory status: spontaneous ventilation  Hydration status: hypovolemic  Follow-up not needed.              Vitals Value Taken Time   /52 07/23/24 0829   Temp 36.6 °C (97.9 °F) 07/23/24 0829   Pulse 79 07/23/24 0829   Resp 18 07/23/24 0829   SpO2 100 % 07/23/24 0829         No case tracking events are documented in the log.      Pain/Jesus Score: Jesus Score: 10 (7/23/2024  8:29 AM)

## 2024-07-24 LAB
FINAL PATHOLOGIC DIAGNOSIS: NORMAL
GROSS: NORMAL
Lab: NORMAL

## 2024-09-03 ENCOUNTER — LAB VISIT (OUTPATIENT)
Dept: LAB | Facility: HOSPITAL | Age: 69
End: 2024-09-03
Attending: STUDENT IN AN ORGANIZED HEALTH CARE EDUCATION/TRAINING PROGRAM
Payer: MEDICARE

## 2024-09-03 ENCOUNTER — OFFICE VISIT (OUTPATIENT)
Dept: INTERNAL MEDICINE | Facility: CLINIC | Age: 69
End: 2024-09-03
Payer: MEDICARE

## 2024-09-03 VITALS
BODY MASS INDEX: 24.39 KG/M2 | HEART RATE: 87 BPM | HEIGHT: 65 IN | DIASTOLIC BLOOD PRESSURE: 64 MMHG | WEIGHT: 146.38 LBS | SYSTOLIC BLOOD PRESSURE: 123 MMHG

## 2024-09-03 DIAGNOSIS — R79.9 ABNORMAL FINDING OF BLOOD CHEMISTRY, UNSPECIFIED: ICD-10-CM

## 2024-09-03 DIAGNOSIS — E78.2 MIXED HYPERLIPIDEMIA: ICD-10-CM

## 2024-09-03 DIAGNOSIS — G89.29 CHRONIC PAIN OF LEFT KNEE: ICD-10-CM

## 2024-09-03 DIAGNOSIS — M25.562 CHRONIC PAIN OF LEFT KNEE: ICD-10-CM

## 2024-09-03 DIAGNOSIS — Z00.00 ROUTINE GENERAL MEDICAL EXAMINATION AT A HEALTH CARE FACILITY: ICD-10-CM

## 2024-09-03 DIAGNOSIS — Z00.00 HEALTHCARE MAINTENANCE: ICD-10-CM

## 2024-09-03 DIAGNOSIS — Z78.0 ASYMPTOMATIC MENOPAUSE: ICD-10-CM

## 2024-09-03 DIAGNOSIS — R79.89 OTHER SPECIFIED ABNORMAL FINDINGS OF BLOOD CHEMISTRY: ICD-10-CM

## 2024-09-03 DIAGNOSIS — Z87.39 HISTORY OF GOUT: ICD-10-CM

## 2024-09-03 DIAGNOSIS — Z00.00 ANNUAL PHYSICAL EXAM: Primary | ICD-10-CM

## 2024-09-03 LAB
ALBUMIN SERPL BCP-MCNC: 3.8 G/DL (ref 3.5–5.2)
ALP SERPL-CCNC: 61 U/L (ref 55–135)
ALT SERPL W/O P-5'-P-CCNC: 13 U/L (ref 10–44)
ANION GAP SERPL CALC-SCNC: 6 MMOL/L (ref 8–16)
AST SERPL-CCNC: 36 U/L (ref 10–40)
BASOPHILS # BLD AUTO: 0.04 K/UL (ref 0–0.2)
BASOPHILS NFR BLD: 0.8 % (ref 0–1.9)
BILIRUB SERPL-MCNC: 0.6 MG/DL (ref 0.1–1)
BUN SERPL-MCNC: 9 MG/DL (ref 8–23)
CALCIUM SERPL-MCNC: 9.9 MG/DL (ref 8.7–10.5)
CHLORIDE SERPL-SCNC: 104 MMOL/L (ref 95–110)
CHOLEST SERPL-MCNC: 245 MG/DL (ref 120–199)
CHOLEST/HDLC SERPL: 4.6 {RATIO} (ref 2–5)
CO2 SERPL-SCNC: 26 MMOL/L (ref 23–29)
CREAT SERPL-MCNC: 1 MG/DL (ref 0.5–1.4)
DIFFERENTIAL METHOD BLD: ABNORMAL
EOSINOPHIL # BLD AUTO: 0.2 K/UL (ref 0–0.5)
EOSINOPHIL NFR BLD: 3.9 % (ref 0–8)
ERYTHROCYTE [DISTWIDTH] IN BLOOD BY AUTOMATED COUNT: 12.1 % (ref 11.5–14.5)
EST. GFR  (NO RACE VARIABLE): >60 ML/MIN/1.73 M^2
ESTIMATED AVG GLUCOSE: 103 MG/DL (ref 68–131)
GLUCOSE SERPL-MCNC: 90 MG/DL (ref 70–110)
HBA1C MFR BLD: 5.2 % (ref 4–5.6)
HCT VFR BLD AUTO: 44.7 % (ref 37–48.5)
HDLC SERPL-MCNC: 53 MG/DL (ref 40–75)
HDLC SERPL: 21.6 % (ref 20–50)
HGB BLD-MCNC: 15.4 G/DL (ref 12–16)
IMM GRANULOCYTES # BLD AUTO: 0.01 K/UL (ref 0–0.04)
IMM GRANULOCYTES NFR BLD AUTO: 0.2 % (ref 0–0.5)
LDLC SERPL CALC-MCNC: 173 MG/DL (ref 63–159)
LYMPHOCYTES # BLD AUTO: 1.2 K/UL (ref 1–4.8)
LYMPHOCYTES NFR BLD: 24.2 % (ref 18–48)
MCH RBC QN AUTO: 32.9 PG (ref 27–31)
MCHC RBC AUTO-ENTMCNC: 34.5 G/DL (ref 32–36)
MCV RBC AUTO: 96 FL (ref 82–98)
MONOCYTES # BLD AUTO: 0.6 K/UL (ref 0.3–1)
MONOCYTES NFR BLD: 11.6 % (ref 4–15)
NEUTROPHILS # BLD AUTO: 3 K/UL (ref 1.8–7.7)
NEUTROPHILS NFR BLD: 59.3 % (ref 38–73)
NONHDLC SERPL-MCNC: 192 MG/DL
NRBC BLD-RTO: 0 /100 WBC
PLATELET # BLD AUTO: 289 K/UL (ref 150–450)
PMV BLD AUTO: 9.8 FL (ref 9.2–12.9)
POTASSIUM SERPL-SCNC: 3.7 MMOL/L (ref 3.5–5.1)
PROT SERPL-MCNC: 6.4 G/DL (ref 6–8.4)
RBC # BLD AUTO: 4.68 M/UL (ref 4–5.4)
SODIUM SERPL-SCNC: 136 MMOL/L (ref 136–145)
TRIGL SERPL-MCNC: 95 MG/DL (ref 30–150)
URATE SERPL-MCNC: 5.4 MG/DL (ref 2.4–5.7)
WBC # BLD AUTO: 5.09 K/UL (ref 3.9–12.7)

## 2024-09-03 PROCEDURE — 36415 COLL VENOUS BLD VENIPUNCTURE: CPT | Performed by: STUDENT IN AN ORGANIZED HEALTH CARE EDUCATION/TRAINING PROGRAM

## 2024-09-03 PROCEDURE — 83036 HEMOGLOBIN GLYCOSYLATED A1C: CPT | Performed by: STUDENT IN AN ORGANIZED HEALTH CARE EDUCATION/TRAINING PROGRAM

## 2024-09-03 PROCEDURE — 99999 PR PBB SHADOW E&M-EST. PATIENT-LVL III: CPT | Mod: PBBFAC,,, | Performed by: STUDENT IN AN ORGANIZED HEALTH CARE EDUCATION/TRAINING PROGRAM

## 2024-09-03 PROCEDURE — 99397 PER PM REEVAL EST PAT 65+ YR: CPT | Mod: GZ,S$PBB,, | Performed by: STUDENT IN AN ORGANIZED HEALTH CARE EDUCATION/TRAINING PROGRAM

## 2024-09-03 PROCEDURE — 85025 COMPLETE CBC W/AUTO DIFF WBC: CPT | Performed by: STUDENT IN AN ORGANIZED HEALTH CARE EDUCATION/TRAINING PROGRAM

## 2024-09-03 PROCEDURE — 99213 OFFICE O/P EST LOW 20 MIN: CPT | Mod: PBBFAC | Performed by: STUDENT IN AN ORGANIZED HEALTH CARE EDUCATION/TRAINING PROGRAM

## 2024-09-03 PROCEDURE — 84550 ASSAY OF BLOOD/URIC ACID: CPT | Performed by: STUDENT IN AN ORGANIZED HEALTH CARE EDUCATION/TRAINING PROGRAM

## 2024-09-03 PROCEDURE — 80061 LIPID PANEL: CPT | Performed by: STUDENT IN AN ORGANIZED HEALTH CARE EDUCATION/TRAINING PROGRAM

## 2024-09-03 PROCEDURE — 80053 COMPREHEN METABOLIC PANEL: CPT | Performed by: STUDENT IN AN ORGANIZED HEALTH CARE EDUCATION/TRAINING PROGRAM

## 2024-09-03 RX ORDER — INDOMETHACIN 25 MG/1
25 CAPSULE ORAL 2 TIMES DAILY PRN
Qty: 90 CAPSULE | Refills: 1 | Status: SHIPPED | OUTPATIENT
Start: 2024-09-03 | End: 2025-09-03

## 2024-09-03 NOTE — PROGRESS NOTES
Subjective:       Patient ID: Dawit Carrion is a 69 y.o. female.    Chief Complaint: Annual Exam (Gout attack on left toe)    HPI    Dawit Carrion is a 69 y.o. female , English speaking, with a history of:  HLD  Open angle glaucoma  H/o atypical ductal hyperplasia of the left breast s/p lumpectomy  Menopause  On testosterone supplementation      [Local Patient]  [place] in[place] [address]       Patient comes to the clinic for a general physical exam (Annual Wellness Visit)    Patient overall feels very well, she has been asymptomatic and has had no concerns or complaints during the past year.      Her most recent complaint was having her left toe swollen and painful during the past week.  She thinks it was gout.  She took some colchicine from her  during 3 days and they pain and redness went away.  Patient admits he has not been watching her diet and she has been eating a lot of chocolate.      Patient denies chest pain, palpitations, shortness for breath.      She completed her mammogram and was found to have a cyst in her breast.  She has a follow-up mammogram in December of 2024.      She also completed her colonoscopy and a 5 mm polyp was found.  The recommendation is to repeat her colonoscopy in 3 years.      No other complaints.    Latest PCP visits:      8/29/23 [facility] - encounter to establish care    Changes in health or medications: No    Specialists visits and recommendations:        H/o ER or Urgent care visits:   NO    H/o Hospitalizations:  NO    H/o falls: None     Life events / lifestyle:   Daughter is in [place]      Most recent / available laboratories reviewed with the patient:    Recent Labs   Lab 01/03/23  0944 08/29/23  0917 09/03/24  0931   WBC 6.56 6.68 5.09   Hemoglobin 14.8 15.3 15.4   Hematocrit 41.6 43.7 44.7   MCV 96 96 96   Platelets 251 285 289       Recent Labs   Lab 12/03/21  1337 01/03/23  0944 08/29/23  0917 09/03/24  0931   Glucose 102 92  "95 90   Sodium 135 L 137 139 136   Potassium 3.9 4.1 4.9 3.7   BUN 9 9 9 9   Creatinine 0.9 0.9 0.9 1.0   eGFR if non  >60.0  --   --   --    Total Bilirubin 0.3  --  0.4 0.6   AST 39  --  38 36   ALT 18  --  15 13       Recent Labs   Lab 08/29/23  0917 09/03/24  0931   Hemoglobin A1C 5.2 5.2       Recent Labs   Lab 08/29/23  0917 09/03/24  0931   Cholesterol 227 H 245 H   Triglycerides 73 95   HDL 58 53   LDL Cholesterol 154.4 173.0 H   Non-HDL Cholesterol 169 192       Recent Labs   Lab 12/03/21  1337   TSH 1.404       Recent Labs   Lab 08/29/23  0917   Hepatitis C Ab Non-reactive         Current medications:    Current Outpatient Medications:     latanoprost 0.005 % ophthalmic solution, Place 1 drop into both eyes every evening., Disp: 2.5 mL, Rfl: 12    progesterone (PROMETRIUM) 100 MG capsule, Take 100 mg by mouth every evening. , Disp: , Rfl:     testosterone cypionate (DEPOTESTOTERONE CYPIONATE) 100 mg/mL injection, Inject into the muscle every 14 (fourteen) days., Disp: , Rfl:     cholecalciferol, vitamin D3, (VITAMIN D3 ORAL), Take 1,000 mg by mouth once daily., Disp: , Rfl:     indomethacin (INDOCIN) 25 MG capsule, Take 1 capsule (25 mg total) by mouth 2 (two) times daily as needed (gout)., Disp: 90 capsule, Rfl: 1      Healthcare Maintenance:  Colon cancer screening:   Last Colonoscopy completed on 7/23/2024     Vaccinations:        Tetanus: 2023       Pneumonia: NO       Zoster: NO       Influenza: NO       COVID vaccination: Unvaccinated    Depression screening: PHQ2 score = 0    Annual Wellness visit approx. Month: SEPTEMBER ROS  11-point review of systems done. Negative except for detailed in the HPI.        Objective:      /64   Pulse 87   Ht 5' 5" (1.651 m)   Wt 66.4 kg (146 lb 6.2 oz)   BMI 24.36 kg/m²      Physical Exam   General appearance: Good general aspect, NAD, conversant   Eyes and HEENT: Normal sclerae, moist mucous membranes, no thyromegaly or " lymphadenopathy  Respiratory: No work of breathing, clear to auscultation bilaterally. No rales, rhonchi, wheezing, or rubs.  Cardiovascular: PMI not displaced. RRR. Normal S1, S2. No murmurs, rubs or gallops.  Abdomen and : Soft, non-tender, nondistended, BS, no hepatosplenomegaly, no masses.  Extremities: no edemas, no extremity lymphadenopathy, no clubbing, no cyanosis.  Nervous System: Alert and oriented x 3, good concentration, no deficits.  Skin: Normal temperature, turgor and texture; no rash, ulcers or subcutaneous nodules  Psych: Appropriate affect, alert and oriented to person, place and time          Assessment:       1. Annual physical exam  Assessment & Plan:  Patient is in overall good general health.  Stable medical conditions listed on the PMH.  Labs reviewed and patient notified.      Orders:  -     CBC Auto Differential; Future; Expected date: 09/03/2025  -     Urinalysis; Future; Expected date: 09/03/2025    2. Chronic pain of left knee  Assessment & Plan:  Chronic, stable.    Patient is following with outpatient orthopedics.  I have offered referral to sports Medicine evaluation at [facility].  Patient declined.      3. Mixed hyperlipidemia  Assessment & Plan:  Lab Results   Component Value Date    CHOL 245 (H) 09/03/2024    CHOL 227 (H) 08/29/2023    CHOL 254 (H) 11/15/2019     Lab Results   Component Value Date    HDL 53 09/03/2024    HDL 58 08/29/2023    HDL 62 11/15/2019     Lab Results   Component Value Date    LDLCALC 173.0 (H) 09/03/2024    LDLCALC 154.4 08/29/2023    LDLCALC 173.2 (H) 11/15/2019     Lab Results   Component Value Date    TRIG 95 09/03/2024    TRIG 73 08/29/2023    TRIG 94 11/15/2019       Lab Results   Component Value Date    CHOLHDL 21.6 09/03/2024    CHOLHDL 25.6 08/29/2023    CHOLHDL 24.4 11/15/2019     Lifestyle modification recommended.    Avoid fried food, red meats, seafood.    Physical activity recommended.    Orders:  -     Comprehensive Metabolic Panel; Future;  Expected date: 09/03/2025  -     Hemoglobin A1C; Future; Expected date: 09/03/2025  -     Lipid Panel; Future; Expected date: 09/03/2025    4. History of gout  Overview:  New, first episode August 2023, left great toe    Assessment & Plan:  One time episode.  Patient managed at home with colchicine that has been prescribed to her .    I am recommended to improve her diet, have provided a handout with a list of foods that she can safely eat to prevent a recurrence.    I am prescribing indomethacin for her to take p.r.n. in case of a new flare    Orders:  -     URIC ACID; Future; Expected date: 09/03/2024  -     indomethacin (INDOCIN) 25 MG capsule; Take 1 capsule (25 mg total) by mouth 2 (two) times daily as needed (gout).  Dispense: 90 capsule; Refill: 1  -     URIC ACID; Future; Expected date: 09/03/2025    5. Asymptomatic menopause  -     DXA Bone Density Axial Skeleton 1 or more sites; Future; Expected date: 09/03/2024    6. Healthcare maintenance  Assessment & Plan:  Health care maintenance and core gaps reviewed and assessed with patient.  Vaccinations recommended:        Tetanus - 2023       Shingles - D       Influenza - N/A       Pneumonia - D  Colon cancer screening:   Colonoscopy: 2023, polyps, next in 2026  Lifestyle recommendations given.  Physical activity recommended.    Legend: Ordered (O), Declined (D), Current (C)        7. Abnormal finding of blood chemistry, unspecified  -     CBC Auto Differential; Future; Expected date: 09/03/2025  -     Hemoglobin A1C; Future; Expected date: 09/03/2025       Summary of orders / plan:       Uric acid  DEXA  Diet  Offered vaccinations, patient declined.  Indomethacin            There are no Patient Instructions on file for this visit.       Problem list updated  Medications reconciled  Education provided  Lifestyle recommendations given  AVS generated, explained, and sent to the patient.  RTC in : 1 year for annual wellness visit, labs  ordered              COLIN CALLAHAN MD, MPH  Internal Medicine  [facility]  [facility]

## 2024-09-03 NOTE — ASSESSMENT & PLAN NOTE
Lab Results   Component Value Date    CHOL 245 (H) 09/03/2024    CHOL 227 (H) 08/29/2023    CHOL 254 (H) 11/15/2019     Lab Results   Component Value Date    HDL 53 09/03/2024    HDL 58 08/29/2023    HDL 62 11/15/2019     Lab Results   Component Value Date    LDLCALC 173.0 (H) 09/03/2024    LDLCALC 154.4 08/29/2023    LDLCALC 173.2 (H) 11/15/2019     Lab Results   Component Value Date    TRIG 95 09/03/2024    TRIG 73 08/29/2023    TRIG 94 11/15/2019       Lab Results   Component Value Date    CHOLHDL 21.6 09/03/2024    CHOLHDL 25.6 08/29/2023    CHOLHDL 24.4 11/15/2019     Lifestyle modification recommended.    Avoid fried food, red meats, seafood.    Physical activity recommended.

## 2024-09-03 NOTE — ASSESSMENT & PLAN NOTE
Health care maintenance and core gaps reviewed and assessed with patient.  Vaccinations recommended:        Tetanus - 2023       Shingles - D       Influenza - N/A       Pneumonia - D  Colon cancer screening:   Colonoscopy: 2023, polyps, next in 2026  Lifestyle recommendations given.  Physical activity recommended.    Legend: Ordered (O), Declined (D), Current (C)

## 2024-09-03 NOTE — ASSESSMENT & PLAN NOTE
Chronic, stable.    Patient is following with outpatient orthopedics.  I have offered referral to sports Medicine evaluation at [facility].  Patient declined.

## 2024-09-03 NOTE — ASSESSMENT & PLAN NOTE
One time episode.  Patient managed at home with colchicine that has been prescribed to her .    I am recommended to improve her diet, have provided a handout with a list of foods that she can safely eat to prevent a recurrence.    I am prescribing indomethacin for her to take p.r.n. in case of a new flare

## 2024-09-26 ENCOUNTER — TELEPHONE (OUTPATIENT)
Dept: RADIOLOGY | Facility: HOSPITAL | Age: 69
End: 2024-09-26
Payer: MEDICARE

## 2024-09-26 NOTE — TELEPHONE ENCOUNTER
[facility] . Patient was instructed on fasting for the contrast mammogram, nothing to eat or drink 2 hours prior to the contrast mammogram.

## 2024-10-30 ENCOUNTER — PATIENT MESSAGE (OUTPATIENT)
Dept: HEMATOLOGY/ONCOLOGY | Facility: CLINIC | Age: 69
End: 2024-10-30
Payer: MEDICARE

## 2024-12-13 ENCOUNTER — HOSPITAL ENCOUNTER (OUTPATIENT)
Dept: RADIOLOGY | Facility: CLINIC | Age: 69
Discharge: HOME | End: 2024-12-13
Attending: STUDENT IN AN ORGANIZED HEALTH CARE EDUCATION/TRAINING PROGRAM
Payer: MEDICARE

## 2024-12-13 ENCOUNTER — HOSPITAL ENCOUNTER (OUTPATIENT)
Dept: RADIOLOGY | Facility: HOSPITAL | Age: 69
Discharge: HOME | End: 2024-12-13
Attending: NURSE PRACTITIONER
Payer: MEDICARE

## 2024-12-13 ENCOUNTER — OFFICE VISIT (OUTPATIENT)
Dept: HEMATOLOGY/ONCOLOGY | Facility: CLINIC | Age: 69
End: 2024-12-13
Payer: MEDICARE

## 2024-12-13 VITALS
BODY MASS INDEX: 25.16 KG/M2 | DIASTOLIC BLOOD PRESSURE: 63 MMHG | TEMPERATURE: 98 F | SYSTOLIC BLOOD PRESSURE: 139 MMHG | RESPIRATION RATE: 17 BRPM | HEIGHT: 65 IN | OXYGEN SATURATION: 98 % | HEART RATE: 90 BPM | WEIGHT: 151 LBS

## 2024-12-13 DIAGNOSIS — N60.92 ATYPICAL DUCTAL HYPERPLASIA OF LEFT BREAST: ICD-10-CM

## 2024-12-13 DIAGNOSIS — R92.8 ABNORMAL ULTRASOUND OF BREAST: ICD-10-CM

## 2024-12-13 DIAGNOSIS — Z91.89 AT HIGH RISK FOR BREAST CANCER: ICD-10-CM

## 2024-12-13 DIAGNOSIS — N60.92 ATYPICAL DUCTAL HYPERPLASIA OF LEFT BREAST: Primary | ICD-10-CM

## 2024-12-13 DIAGNOSIS — Z78.0 ASYMPTOMATIC MENOPAUSE: ICD-10-CM

## 2024-12-13 DIAGNOSIS — Z80.3 FAMILY HISTORY OF BREAST CANCER: ICD-10-CM

## 2024-12-13 DIAGNOSIS — Z12.31 ENCOUNTER FOR SCREENING MAMMOGRAM FOR MALIGNANT NEOPLASM OF BREAST: Primary | ICD-10-CM

## 2024-12-13 LAB
CREAT SERPL-MCNC: 1.1 MG/DL (ref 0.5–1.4)
SAMPLE: NORMAL

## 2024-12-13 PROCEDURE — 25500020 PHARM REV CODE 255: Performed by: NURSE PRACTITIONER

## 2024-12-13 PROCEDURE — 77080 DXA BONE DENSITY AXIAL: CPT | Mod: 26,,, | Performed by: INTERNAL MEDICINE

## 2024-12-13 PROCEDURE — 77067 SCR MAMMO BI INCL CAD: CPT | Mod: TC

## 2024-12-13 PROCEDURE — 99213 OFFICE O/P EST LOW 20 MIN: CPT | Mod: PBBFAC,25 | Performed by: NURSE PRACTITIONER

## 2024-12-13 PROCEDURE — 77063 BREAST TOMOSYNTHESIS BI: CPT | Mod: 26,,, | Performed by: RADIOLOGY

## 2024-12-13 PROCEDURE — 77067 SCR MAMMO BI INCL CAD: CPT | Mod: 26,,, | Performed by: RADIOLOGY

## 2024-12-13 PROCEDURE — 77080 DXA BONE DENSITY AXIAL: CPT | Mod: TC

## 2024-12-13 PROCEDURE — 99999 PR PBB SHADOW E&M-EST. PATIENT-LVL III: CPT | Mod: PBBFAC,,, | Performed by: NURSE PRACTITIONER

## 2024-12-13 RX ADMIN — IOHEXOL 100 ML: 350 INJECTION, SOLUTION INTRAVENOUS at 09:12

## 2024-12-13 NOTE — PROGRESS NOTES
Subjective:       Patient ID: Dawit Carrion is a 69 y.o. female.    Chief Complaint: Atypical ductal hyperplasia of left breast      HPI   69-year-old female here for follow up for diagnosis of atypical ductal hyperplasia. Opted out of chemoprevention.     Overall doing well  No breast concerns.   Left knee pain better- comes and goes. MRI to left hip scheduled.   Appetite good and weight stable.   No other complaints or issues.   Walks a mile a day. Stays active.   Drives from [place] (3 hours away).     Colonoscopy done 7/23/2024 - polyps noted but benign. Repeat in 3 years.           1/13/2024 REYNOLD:   Findings:  There are scattered areas of fibroglandular density.   There is minimal and symmetric background parenchymal enhancement.  There is no evidence of suspicious masses, calcifications or other abnormal findings. There is no suspicious contrast enhancement.  Impression:   Bilateral  There is no contrast enhanced mammogram evidence of malignancy.  BI-RADS Category: 1 - Negative  Recommendation:  Routine contrast screening mammogram in 1 year is recommended.  Your estimated lifetime risk of breast cancer (to age 85) based on Tyrer-Cuzick risk assessment model is 15.17%.  According to the American Cancer Society, patients with a lifetime breast cancer risk of 20% or higher might benefit from supplemental screening tests, such as screening breast MRI.  Trenton Carmichael MD         ADH History: per Dr. Stoner's note:  She underwent screening mammogram on November 15, 2019 which showed some calcifications in the upper outer portion of the left breast.      Follow-up diagnostic mammogram on November 22, 2019 showed amorphous calcifications the upper outer quadrant left breast at 1:00 a.m..    On December 10, 2019 a needle biopsy was performed which showed atypical ductal hyperplasia with calcifications.      Follow-up excisional biopsy was performed on January 22, 2020 which again showed atypical ductal  "hyperplasia     She reports significant menopausal symptoms and the use of supplemental estrogen and progesterone since her hysterectomy.    Opts out of chemo prevention. Risk factor modifications discussed. Recommended continued exercise and healthy diet.       Menstrual History:    Menarche - 11   G -  4  P -4   First birth age -  25,BCP -     Menopause -  Total hysterectomy age 48     HRT - estrogen implants up to current DX    Family History -                                 Breast -  Maternal cousin                                Ovarian - no    Other - sister with thyroid cancer    Social History :    Smoking - quit 30 + years ago  ETOH - no  Work - [employer]    PMH: hyperlipidemia      Review of Systems   Constitutional:         See above   All other systems reviewed and are negative.      Objective:        Vitals:    12/13/24 1008   BP: 139/63   Pulse: 90   Resp: 17   Temp: 98 °F (36.7 °C)   TempSrc: Oral   SpO2: 98%   Weight: 68.5 kg (151 lb 0.2 oz)   Height: 5' 5" (1.651 m)            Physical Exam  Vitals reviewed.   Constitutional:       General: She is not in acute distress.     Appearance: She is well-developed.   HENT:      Mouth/Throat:      Pharynx: No oropharyngeal exudate.   Eyes:      General: No scleral icterus.  Cardiovascular:      Rate and Rhythm: Normal rate and regular rhythm.   Pulmonary:      Effort: Pulmonary effort is normal.      Breath sounds: Normal breath sounds. No wheezing or rales.   Chest:   Breasts:     Right: No mass, nipple discharge or skin change.      Left: No mass, nipple discharge or skin change.          Comments: Left breast scar healed with underlying thickness.   Abdominal:      Palpations: Abdomen is soft. There is no mass.      Tenderness: There is no abdominal tenderness.   Musculoskeletal:         General: Normal range of motion.      Comments: No spinal or paraspinal tenderness   Lymphadenopathy:      Cervical: No cervical adenopathy. "      Upper Body:      Right upper body: No axillary adenopathy.      Left upper body: No axillary adenopathy.   Skin:     General: Skin is warm.      Findings: No lesion.   Neurological:      Mental Status: She is alert and oriented to person, place, and time.   Psychiatric:         Behavior: Behavior normal.         Thought Content: Thought content normal.              Assessment:       1. Atypical ductal hyperplasia of left breast    2. At high risk for breast cancer    3. Family history of breast cancer            Plan:         Doing well, clinically negative.    REYNOLD negative today. Repeat annually. Unable to do MRI as claustrophobic  Opts to come every 6 months for clinical breast exam.   Encourage breast awareness.  Continue to follow up with PCP for other health maintenance and all other established providers.   Reassurance given.      Route Chart for Scheduling    Med Onc Chart Routing      Follow up with physician    Follow up with [facility] 6 months. for a breast exam   Infusion scheduling note    Injection scheduling note    Labs    Imaging    Pharmacy appointment    Other referrals                       Patient is in agreement with the proposed treatment plan. All questions were answered to the patient's satisfaction. Pt knows to call clinic for any new or worsening symptoms and if anything is needed before the next clinic visit.    Ludivina Fortune, MSN, APRN, FNP-C  Nurse Practitioner to Dr. Caitlin Thacker  [facility]  [facility]  Hematology & Medical Oncology  [address] [place] [id number]  ph. [phone] ext [phone]  Fax. [phone]              30 minutes of total time spent on the encounter, which includes face to face time and non-face to face time preparing to see the patient (eg, review of tests), Obtaining and/or reviewing separately obtained history, Documenting clinical information in the electronic or other health record, Independently  interpreting results (not separately reported) and communicating results to the patient/family/caregiver, or Care coordination (not separately reported).

## 2025-04-22 ENCOUNTER — TELEPHONE (OUTPATIENT)
Dept: HEMATOLOGY/ONCOLOGY | Facility: CLINIC | Age: 70
End: 2025-04-22
Payer: MEDICARE

## 2025-04-22 NOTE — TELEPHONE ENCOUNTER
Called pt scheduled 6 month follow up appt.   Pt verbalized understanding.     No need for mammo until December.       ----- Message from [employer] sent at 4/22/2025 12:02 PM CDT -----  Regarding: Scheduling Request  Contact: [phone]  Scheduling RequestAppt Type: epDate/Time Preference: July Caller Name: ptContact Preference:   [phone]  Comment: 6 month exam, would like to know if mammo is needed  please call to advise Thank you

## 2025-06-23 ENCOUNTER — TELEPHONE (OUTPATIENT)
Dept: HEMATOLOGY/ONCOLOGY | Facility: CLINIC | Age: 70
End: 2025-06-23
Payer: MEDICARE

## 2025-06-23 NOTE — TELEPHONE ENCOUNTER
Copied from CRM #[id number]. Topic: Appointments - Appointment Rescheduling  >> Jun 23, 2025  1:34 PM Susan wrote:  Scheduling Request           Appt Type:  Ep      Date/Time Preference: week in 7/14     Caller Name: pt      Contact Preference: [phone]    Comment: personal conflict, please call to advise thank you

## 2025-07-25 ENCOUNTER — OFFICE VISIT (OUTPATIENT)
Dept: HEMATOLOGY/ONCOLOGY | Facility: CLINIC | Age: 70
End: 2025-07-25
Payer: MEDICARE

## 2025-07-25 VITALS
HEART RATE: 91 BPM | DIASTOLIC BLOOD PRESSURE: 66 MMHG | OXYGEN SATURATION: 96 % | WEIGHT: 144.19 LBS | TEMPERATURE: 98 F | HEIGHT: 65 IN | BODY MASS INDEX: 24.02 KG/M2 | RESPIRATION RATE: 18 BRPM | SYSTOLIC BLOOD PRESSURE: 150 MMHG

## 2025-07-25 DIAGNOSIS — Z12.39 BREAST CANCER SCREENING, HIGH RISK PATIENT: ICD-10-CM

## 2025-07-25 DIAGNOSIS — N60.92 ATYPICAL DUCTAL HYPERPLASIA OF LEFT BREAST: Primary | ICD-10-CM

## 2025-07-25 DIAGNOSIS — Z12.31 ENCOUNTER FOR SCREENING MAMMOGRAM FOR MALIGNANT NEOPLASM OF BREAST: ICD-10-CM

## 2025-07-25 DIAGNOSIS — Z91.89 AT HIGH RISK FOR BREAST CANCER: ICD-10-CM

## 2025-07-25 DIAGNOSIS — Z00.00 PREVENTATIVE HEALTH CARE: ICD-10-CM

## 2025-07-25 DIAGNOSIS — Z80.3 FAMILY HISTORY OF BREAST CANCER: ICD-10-CM

## 2025-07-25 PROCEDURE — 99213 OFFICE O/P EST LOW 20 MIN: CPT | Mod: PBBFAC | Performed by: NURSE PRACTITIONER

## 2025-07-25 PROCEDURE — 99999 PR PBB SHADOW E&M-EST. PATIENT-LVL III: CPT | Mod: PBBFAC,,, | Performed by: NURSE PRACTITIONER

## 2025-07-25 NOTE — PROGRESS NOTES
Subjective:       Patient ID: Dawit Carrion is a 70 y.o. female.    Chief Complaint: Atypical ductal hyperplasia of left breast      HPI   70-year-old female here for follow up for diagnosis of atypical ductal hyperplasia. Opted out of chemoprevention.     Overall doing well  No breast concerns. She noted a tenderness in right breast at site of previous cyst. She was on a zero turn lawnmower and hit a bump- tenderness lasted 1 day.   Hip pain - needs replacement. F/u in August and will likely get scheduled.   Left knee pain better- comes and goes.   Appetite good and weight stable.   No other complaints or issues.   Walks a mile a day. Stays active.   She has a history of depression but makes sure she goes outside as this helps.   Drives from [place] (3 hours away).     Colonoscopy done 7/23/2024 - polyps noted but benign. Repeat in 3 years.              ADH History: per Dr. Stoner's note:  She underwent screening mammogram on November 15, 2019 which showed some calcifications in the upper outer portion of the left breast.      Follow-up diagnostic mammogram on November 22, 2019 showed amorphous calcifications the upper outer quadrant left breast at 1:00 a.m..    On December 10, 2019 a needle biopsy was performed which showed atypical ductal hyperplasia with calcifications.      Follow-up excisional biopsy was performed on January 22, 2020 which again showed atypical ductal hyperplasia     She reports significant menopausal symptoms and the use of supplemental estrogen and progesterone since her hysterectomy.    Opts out of chemo prevention. Risk factor modifications discussed. Recommended continued exercise and healthy diet.       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      Objective:        Vitals:    07/25/25 1137   BP: (!) 150/66  Comment: had stressful drive in   BP Location: Left arm   Patient Position: Sitting   Pulse: 91   Resp: 18   Temp: 97.9 °F (36.6 °C)   TempSrc: Temporal   SpO2: 96%   Weight: 65.4 kg (144 lb 2.9 oz)   Height: 5' 5" (1.651 m)            Physical Exam  Vitals reviewed.   Constitutional:       General: She is not in acute distress.     Appearance: She is well-developed.   HENT:      Mouth/Throat:      Pharynx: No oropharyngeal exudate.   Eyes:      General: No scleral icterus.  Cardiovascular:      Rate and Rhythm: Normal rate and regular rhythm.   Pulmonary:      Effort: Pulmonary effort is normal.      Breath sounds: Normal breath sounds. No wheezing or rales.   Chest:   Breasts:     Right: No mass, nipple discharge or skin change.      Left: No mass, nipple discharge or skin change.          Comments: Left breast scar healed with underlying thickness. No masses or LAD noted.   Abdominal:      Palpations: Abdomen is soft. There is no mass.      Tenderness: There is no abdominal tenderness.   Musculoskeletal:         General: Normal range of motion.      Comments: No spinal or paraspinal tenderness   Lymphadenopathy:      Cervical: No cervical adenopathy.      Upper Body:      Right upper body: No axillary adenopathy.      Left upper body: No axillary adenopathy.   Skin:     General: Skin is warm.      Findings: No lesion.   Neurological:      Mental Status: She is alert and oriented to person, place, and time.   Psychiatric:         Behavior: Behavior normal.         Thought Content: Thought content normal.         Labs reviewed     Assessment:       1. Atypical ductal " hyperplasia of left breast    2. At high risk for breast cancer    3. Family history of breast cancer    4. Encounter for screening mammogram for malignant neoplasm of breast    5. Breast cancer screening, high risk patient    6. Preventative health care              Plan:         Doing well, clinically negative.    REYNOLD due 12/2025  See me 1 year for breast exam.   Encourage breast awareness.  Continue to follow up with PCP for other health maintenance and all other established providers.   Reassurance given.      Route Chart for Scheduling    Med Onc Chart Routing      Follow up with physician    Follow up with [facility] 1 year. [facility]   Infusion scheduling note    Injection scheduling note    Labs    Imaging    Pharmacy appointment    Other referrals                   Patient is in agreement with the proposed treatment plan. All questions were answered to the patient's satisfaction. Pt knows to call clinic for any new or worsening symptoms and if anything is needed before the next clinic visit.    Ludivina Fortune, MSN, APRN, FNP-C  [facility]  Hematology & Medical Oncology  [address] [place] [id number]  ph. [phone] ext [phone]  Fax. [phone]              30 minutes of total time spent on the encounter, which includes face to face time and non-face to face time preparing to see the patient (eg, review of tests), Obtaining and/or reviewing separately obtained history, Documenting clinical information in the electronic or other health record, Independently interpreting results (not separately reported) and communicating results to the patient/family/caregiver, or Care coordination (not separately reported).      code applied: patient requires or will require a continuous, longitudinal, and active collaborative plan of care related to this patient's health condition, high risk cancer --the management of which requires the direction of a practitioner with specialized clinical  knowledge, skill, and expertise.

## (undated) DEVICE — GLOVE BIOGEL PI ORTHO PRO SZ 8

## (undated) DEVICE — BRA SURGICAL LG 38-40

## (undated) DEVICE — PAD ABD 8X10 STERILE

## (undated) DEVICE — BRA POST SURGICAL WHT 36-38IN

## (undated) DEVICE — SEE MEDLINE ITEM 146292

## (undated) DEVICE — Device

## (undated) DEVICE — SOL 0.9% NACL IRRI.IN STERIL

## (undated) DEVICE — PACK UNIVERSAL SPLIT II

## (undated) DEVICE — TUBING ARTHROSCOPY

## (undated) DEVICE — SEE MEDLINE ITEM 146417

## (undated) DEVICE — SEE MEDLINE ITEM 157216

## (undated) DEVICE — SPONGE GAUZE 16PLY 4X4

## (undated) DEVICE — TUBING SUCTION SCALLOP 3/16X10

## (undated) DEVICE — ELECTRODE BLADE INSULATED 1 IN

## (undated) DEVICE — CONTAINER SPECIMEN STRL 4OZ

## (undated) DEVICE — SYR 10CC LUER LOCK

## (undated) DEVICE — GLOVE SURG ULTRA TOUCH 6

## (undated) DEVICE — PAD SUREFIT GRND ELECTRD 10FT

## (undated) DEVICE — SPONGE LAP 18X18 PREWASHED

## (undated) DEVICE — GAUZE SPONGE 4X4 12PLY

## (undated) DEVICE — DRAPE STERI INSTRUMENT 1018

## (undated) DEVICE — NDL 18GA X1 1/2 REG BEVEL

## (undated) DEVICE — ELECTRODE REM PLYHSV RETURN 9

## (undated) DEVICE — SUT ETHILON 3-0 FS-1 30

## (undated) DEVICE — SYS LABEL CORRECT MED

## (undated) DEVICE — DRAPE U STD FILM LG 60X84IN

## (undated) DEVICE — BANDAGE GAUZE 6PLY FLUFF 2X3

## (undated) DEVICE — SUT 2/0 30IN SILK BLK BRAI

## (undated) DEVICE — NDL HYPODERMIC BLUNT 18G 1.5IN

## (undated) DEVICE — GLOVE SURGEONS ULTRA TOUCH 6.5

## (undated) DEVICE — DRAPE THREE-QUARTER 53X77IN

## (undated) DEVICE — TOURNIQUET SB QC SP 30X4IN

## (undated) DEVICE — SUT MCRYL PLUS 4-0 PS2 27IN

## (undated) DEVICE — COVER PROBE NL STRL 3.6X96IN

## (undated) DEVICE — BANDAGE MATRIX HK LOOP 6IN 5YD

## (undated) DEVICE — CUTTER MENISCUS AGGRESSIVE 4.0

## (undated) DEVICE — GOWN POLY REINF BRTH SLV LG

## (undated) DEVICE — GLOVE SURG ULTRA TOUCH 7

## (undated) DEVICE — TRAY MINOR GEN SURG

## (undated) DEVICE — ADHESIVE DERMABOND ADVANCED

## (undated) DEVICE — SEE MEDLINE ITEM 157169

## (undated) DEVICE — GAUZE FLUFF XXLG 36X36 2 PLY

## (undated) DEVICE — NDL SAFETY 22G X 1.5 ECLIPSE

## (undated) DEVICE — LABEL BLANK SURG 10 PER SHEET

## (undated) DEVICE — GLOVE SURG ULTRA TOUCH 7.5